# Patient Record
Sex: FEMALE | Race: WHITE | NOT HISPANIC OR LATINO | Employment: FULL TIME | URBAN - METROPOLITAN AREA
[De-identification: names, ages, dates, MRNs, and addresses within clinical notes are randomized per-mention and may not be internally consistent; named-entity substitution may affect disease eponyms.]

---

## 2017-01-27 DIAGNOSIS — E03.9 HYPOTHYROIDISM: Primary | ICD-10-CM

## 2017-01-27 NOTE — TELEPHONE ENCOUNTER
Levothyroxine 100mg  Last Written Prescription Date:  12/6/16  Last Fill Quantity: 30,   # refills: 1  Last Office Visit with [facility] primary care provider:  12/1/16  Future Office visit: none    Pt due for labs, no refills until labs are completed. Rx denied.

## 2017-02-01 DIAGNOSIS — E03.9 HYPOTHYROIDISM: Primary | ICD-10-CM

## 2017-02-01 DIAGNOSIS — E03.9 HYPOTHYROIDISM: ICD-10-CM

## 2017-02-01 PROCEDURE — 84439 ASSAY OF FREE THYROXINE: CPT | Performed by: OBSTETRICS & GYNECOLOGY

## 2017-02-01 PROCEDURE — 84443 ASSAY THYROID STIM HORMONE: CPT | Performed by: OBSTETRICS & GYNECOLOGY

## 2017-02-01 PROCEDURE — 36415 COLL VENOUS BLD VENIPUNCTURE: CPT | Performed by: OBSTETRICS & GYNECOLOGY

## 2017-02-01 RX ORDER — LEVOTHYROXINE SODIUM 100 UG/1
TABLET ORAL
Qty: 30 TABLET | Refills: 0 | Status: SHIPPED | OUTPATIENT
Start: 2017-02-01 | End: 2017-03-17 | Stop reason: DRUGHIGH

## 2017-02-01 NOTE — TELEPHONE ENCOUNTER
Levothyroxine 100mcg      Last Written Prescription Date:  12/30/15  Last Fill Quantity: 90,   # refills: 2  Last Office Visit with [facility] primary care provider:  12/1/16  Future Office visit: none    Pt due for labs, already denied refill once on 1/27/17. Called pt and informed she is due for labs, pt transferred to scheduling to make lab only apt. [facility] extension sent to get pt to her lab appointment.

## 2017-02-02 ENCOUNTER — TELEPHONE (OUTPATIENT)
Dept: NURSING | Facility: CLINIC | Age: 52
End: 2017-02-02

## 2017-02-02 DIAGNOSIS — E03.9 HYPOTHYROID: Primary | ICD-10-CM

## 2017-02-02 LAB
T4 FREE SERPL-MCNC: 1.53 NG/DL (ref 0.76–1.46)
TSH SERPL DL<=0.05 MIU/L-ACNC: 0.82 MU/L (ref 0.4–4)

## 2017-02-02 RX ORDER — LEVOTHYROXINE SODIUM 88 UG/1
88 TABLET ORAL DAILY
Qty: 30 TABLET | Refills: 1 | Status: SHIPPED | OUTPATIENT
Start: 2017-02-02 | End: 2017-03-17

## 2017-02-02 NOTE — TELEPHONE ENCOUNTER
Notes Recorded by Chang Benz MD on 2/2/2017 at 10:16 AM  Decrease dose further to .88 and recheck labs in 6 weeks.    Called pt and informed of results. She stated understanding, had no questions, and was transferred to scheduling to make lab appointment. Rx sent, orders placed.

## 2017-03-06 DIAGNOSIS — F41.9 ANXIETY: ICD-10-CM

## 2017-03-06 DIAGNOSIS — G47.00 INSOMNIA, UNSPECIFIED TYPE: ICD-10-CM

## 2017-03-06 NOTE — TELEPHONE ENCOUNTER
Controlled Substance Refill Request for Pending Prescriptions:                       Disp   Refills    clonazePAM (KLONOPIN) 1 MG tablet [Pharmac*90 tab*0        Sig: TAKE 1 TABLET BY MOUTH ONCE DAILY AT BEDTIME    zolpidem (AMBIEN) 5 MG tablet [Pharmacy Me*90 tab*0        Sig: TAKE 1 TABLET BY MOUTH ONCE NIGHTLY AS NEEDED FOR           SLEEP      Problem List Complete:  Yes   checked in past 6 months?  No, route to RN  Last OV:05/17/2016  Last Refills:12/06/2016  QTY:90  Refills:0  Controlled substance agreement in chart:NO

## 2017-03-07 RX ORDER — ZOLPIDEM TARTRATE 5 MG/1
TABLET ORAL
Qty: 90 TABLET | Refills: 0 | Status: SHIPPED | OUTPATIENT
Start: 2017-03-07 | End: 2018-01-08

## 2017-03-07 RX ORDER — CLONAZEPAM 1 MG/1
TABLET ORAL
Qty: 90 TABLET | Refills: 0 | Status: SHIPPED | OUTPATIENT
Start: 2017-03-07 | End: 2018-01-08

## 2017-03-07 NOTE — TELEPHONE ENCOUNTER
Routing refill request to provider for review/approval because:  Drug not on the [facility] refill protocol     Stefany Hamlin RN, BSN

## 2017-03-16 DIAGNOSIS — E03.9 HYPOTHYROID: ICD-10-CM

## 2017-03-16 PROCEDURE — 84443 ASSAY THYROID STIM HORMONE: CPT | Performed by: OBSTETRICS & GYNECOLOGY

## 2017-03-16 PROCEDURE — 36415 COLL VENOUS BLD VENIPUNCTURE: CPT | Performed by: OBSTETRICS & GYNECOLOGY

## 2017-03-16 PROCEDURE — 84439 ASSAY OF FREE THYROXINE: CPT | Performed by: OBSTETRICS & GYNECOLOGY

## 2017-03-17 DIAGNOSIS — E03.9 HYPOTHYROID: ICD-10-CM

## 2017-03-17 LAB
T4 FREE SERPL-MCNC: 1.12 NG/DL (ref 0.76–1.46)
TSH SERPL DL<=0.05 MIU/L-ACNC: 2.35 MU/L (ref 0.4–4)

## 2017-03-17 RX ORDER — LEVOTHYROXINE SODIUM 88 UG/1
88 TABLET ORAL DAILY
Qty: 90 TABLET | Refills: 2 | Status: SHIPPED | OUTPATIENT
Start: 2017-03-17 | End: 2018-01-05

## 2017-03-17 NOTE — TELEPHONE ENCOUNTER
Notes Recorded by Chang Benz MD on 3/17/2017 at 12:45 PM  Normal. Keep this dose.    LMTCB. Rx pended.

## 2017-03-30 DIAGNOSIS — E03.9 HYPOTHYROID: ICD-10-CM

## 2017-03-30 NOTE — TELEPHONE ENCOUNTER
Fax received from [facility] requesting a 90 day supply dispense of pt;s levothyroxine. Faxed back 90 tab dispense 2 rf was sent on 3/17/17.

## 2017-05-16 ENCOUNTER — TELEPHONE (OUTPATIENT)
Dept: FAMILY MEDICINE | Facility: CLINIC | Age: 52
End: 2017-05-16

## 2017-05-16 NOTE — LETTER
[place] [address]  [address]  [place]  [address]  Tel: [phone]    May 16, 2017    Vero [place]  [address] [address]        Dear Ms. Lua,    Enclosed are the labs completed in the last year at our office.   If you have any further questions or problems, please contact our office.      Sincerely,    Emile Jara MD / mumtaz          Enclosure: Lab Results

## 2017-05-16 NOTE — TELEPHONE ENCOUNTER
Reason for Call:  Blood Work    Detailed comments: Patient asked for her most recent Blood work  With her CBC, Metabolic Panel and Lipids    Phone Number Patient can be reached at: Cell number on file:    Telephone Information:   Mobile [phone]       Best Time: anytime    Please Mail this to her:  [address] [place] [address]    Can we leave a detailed message on this number? YES    Call taken on 5/16/2017 at 12:42 PM by Omero Fairbanks

## 2017-11-19 ENCOUNTER — HEALTH MAINTENANCE LETTER (OUTPATIENT)
Age: 52
End: 2017-11-19

## 2017-12-03 ENCOUNTER — TRANSFERRED RECORDS (OUTPATIENT)
Dept: HEALTH INFORMATION MANAGEMENT | Facility: CLINIC | Age: 52
End: 2017-12-03

## 2018-01-05 DIAGNOSIS — E03.9 HYPOTHYROID: ICD-10-CM

## 2018-01-05 RX ORDER — LEVOTHYROXINE SODIUM 88 UG/1
TABLET ORAL
Qty: 90 TABLET | Refills: 0 | Status: SHIPPED | OUTPATIENT
Start: 2018-01-05 | End: 2018-02-01

## 2018-01-05 NOTE — TELEPHONE ENCOUNTER
Levothyroxine 88mcg      Last Written Prescription Date:  3/17/17  Last Fill Quantity: 90,   # refills: 2  Last Office Visit: 12/1/16  Future Office visit:    Next 5 appointments (look out 90 days)     Jan 11, 2018  9:20 AM CST   PHYSICAL with Chagn Benz MD   [facility] [place] [place]    [address] [place] [address]   [phone]                   Medication is being filled for 1 time refill only due to:  Patient needs to be seen because it has been more than one year since last visit.   Pt due for annual, appt scheduled,3 month supply sent for insurance purposes.

## 2018-01-08 DIAGNOSIS — F41.9 ANXIETY: ICD-10-CM

## 2018-01-08 DIAGNOSIS — G47.00 INSOMNIA, UNSPECIFIED TYPE: ICD-10-CM

## 2018-01-09 NOTE — TELEPHONE ENCOUNTER
Requested Prescriptions   Pending Prescriptions Disp Refills     clonazePAM (KLONOPIN) 1 MG tablet [Pharmacy Med Name: CLONAZEPAM 1 MG TABLET] 90 tablet      Sig: TAKE 1 TABLET BY MOUTH AT BEDTIME    There is no refill protocol information for this order        zolpidem (AMBIEN) 5 MG tablet [Pharmacy Med Name: ZOLPIDEM TARTRATE 5 MG TABLET] 90 tablet      Sig: TAKE 1 TABLET BY MOUTH AT BEDTIME AS NEEDED    There is no refill protocol information for this order        clonazePAM (KLONOPIN) 1 MG tablet      Last Written Prescription Date:  3/07/17  Last Fill Quantity: 90 tablet,   # refills: 0  Last Office Visit: 5/17/16 [facility]  Future Office visit:    Next 5 appointments (look out 90 days)     Jan 11, 2018  9:20 AM CST   PHYSICAL with Chang [facility]z MD   [facility]    [address] [place] [address]   [phone]                   Routing refill request to provider for review/approval because:  Drug not on the [facility] or  Health refill protocol or controlled substance      zolpidem (AMBIEN) 5 MG tablet      Last Written Prescription Date:  3/07/17  Last Fill Quantity: 90 tablet,   # refills: 0  Last Office Visit: 5/17/16 [facility]  Future Office visit:    Next 5 appointments (look out 90 days)     Jan 11, 2018  9:20 AM CST   PHYSICAL with Chang Benz MD   [facility]    [address] [place] [address]   [phone]                   Routing refill request to provider for review/approval because:  Drug not on the [facility] or [facility] refill protocol or controlled substance

## 2018-01-10 RX ORDER — ZOLPIDEM TARTRATE 5 MG/1
TABLET ORAL
Qty: 30 TABLET | Refills: 0 | Status: SHIPPED | OUTPATIENT
Start: 2018-01-10 | End: 2018-02-01

## 2018-01-10 RX ORDER — CLONAZEPAM 1 MG/1
TABLET ORAL
Qty: 30 TABLET | Refills: 0 | Status: SHIPPED | OUTPATIENT
Start: 2018-01-10 | End: 2018-02-01

## 2018-01-10 NOTE — TELEPHONE ENCOUNTER
Please review.  Pt has next F/U on 2/1/17, changed qty from 90 to 30 as has not been seen since 5/17/16.

## 2018-01-29 ENCOUNTER — OFFICE VISIT (OUTPATIENT)
Dept: FAMILY MEDICINE | Facility: CLINIC | Age: 53
End: 2018-01-29
Payer: COMMERCIAL

## 2018-01-29 VITALS
TEMPERATURE: 97.6 F | OXYGEN SATURATION: 100 % | DIASTOLIC BLOOD PRESSURE: 68 MMHG | SYSTOLIC BLOOD PRESSURE: 109 MMHG | WEIGHT: 166 LBS | HEIGHT: 68 IN | BODY MASS INDEX: 25.16 KG/M2 | HEART RATE: 77 BPM

## 2018-01-29 DIAGNOSIS — N30.10 INTERSTITIAL CYSTITIS: ICD-10-CM

## 2018-01-29 DIAGNOSIS — R30.0 DYSURIA: Primary | ICD-10-CM

## 2018-01-29 LAB
ALBUMIN UR-MCNC: NEGATIVE MG/DL
APPEARANCE UR: CLEAR
BILIRUB UR QL STRIP: NEGATIVE
COLOR UR AUTO: YELLOW
GLUCOSE UR STRIP-MCNC: NEGATIVE MG/DL
HGB UR QL STRIP: NEGATIVE
KETONES UR STRIP-MCNC: NEGATIVE MG/DL
LEUKOCYTE ESTERASE UR QL STRIP: NEGATIVE
NITRATE UR QL: NEGATIVE
PH UR STRIP: 6 PH (ref 5–7)
SOURCE: NORMAL
SP GR UR STRIP: <=1.005 (ref 1–1.03)
UROBILINOGEN UR STRIP-ACNC: 0.2 EU/DL (ref 0.2–1)

## 2018-01-29 PROCEDURE — 99213 OFFICE O/P EST LOW 20 MIN: CPT | Performed by: NURSE PRACTITIONER

## 2018-01-29 PROCEDURE — 81003 URINALYSIS AUTO W/O SCOPE: CPT | Performed by: NURSE PRACTITIONER

## 2018-01-29 NOTE — MR AVS SNAPSHOT
"              After Visit Summary   1/29/2018    Vero Lua    MRN: [id number]           Patient Information     Date Of Birth          1965        Visit Information        Provider Department      1/29/2018 9:45 AM Mariama Kaur APRN CNP [facility]        Today's Diagnoses     Dysuria    -  1    Interstitial cystitis           Follow-ups after your visit        Follow-up notes from your care team     Return if symptoms worsen or fail to improve.      Your next 10 appointments already scheduled     Feb 01, 2018  1:00 PM CST   Office Visit with Emile aJra MD   [facility]    [address] [address] [phone]           Bring a current list of meds and any records pertaining to this visit. For Physicals, please bring immunization records and any forms needing to be filled out. Please arrive 10 minutes early to complete paperwork.            Feb 08, 2018  8:00 AM CST   (Arrive by 7:45 AM)   MA SCREENING DIGITAL BILATERAL with [email or web address]   [facility]    [address], [address]  [facility] [address] [phone]           Do not use any powder, lotion or deodorant under your arms or on your breast. If you do, we will ask you to remove it before your exam.  Wear comfortable, two-piece clothing.  If you have any allergies, tell your care team.  Bring any previous mammograms from other facilities or have them mailed to the breast center. Three-dimensional (3D) mammograms are available at [place] locations in [facility], [facility], and [place]. [facility] locations include [place] and [facility] in [place]. Benefits of 3D mammograms include: - Improved rate of cancer detection - Decreases your chance of having to go back for more tests, which means fewer: - \"False-positive\" results (This means that " "there is an abnormal area but it isn't cancer.) - Invasive testing procedures, such as a biopsy or surgery - Can provide clearer images of the breast if you have dense breast tissue. 3D mammography is an optional exam that anyone can have with a 2D mammogram. It doesn't replace or take the place of a 2D mammogram. 2D mammograms remain an effective screening test for all women.  Not all insurance companies cover the cost of a 3D mammogram. Check with your insurance.            Feb 08, 2018  8:20 AM CST   PHYSICAL with Chang Benz MD   [facility]    [address] [address] [phone]              Who to contact     If you have questions or need follow up information about today's clinic visit or your schedule please contact [facility] directly at [phone].  Normal or non-critical lab and imaging results will be communicated to you by [employer], letter or phone within 4 business days after the clinic has received the results. If you do not hear from us within 7 days, please contact the clinic through [employer] or phone. If you have a critical or abnormal lab result, we will notify you by phone as soon as possible.  Submit refill requests through [employer] or call your pharmacy and they will forward the refill request to us. Please allow 3 business days for your refill to be completed.          Additional Information About Your Visit        [employer] Information     [employer] lets you send messages to your doctor, view your test results, renew your prescriptions, schedule appointments and more. To sign up, go to [email or web address] . Click on \"Log in\" on the left side of the screen, which will take you to the Welcome page. Then click on \"Sign up Now\" on the right side of the page.     You will be asked to enter the access code listed below, as well as some personal information. Please follow the directions to " "create your username and password.     Your access code is: [email or web address]  Expires: 2018 10:47 AM     Your access code will  in 90 days. If you need help or a new code, please call your [facility] clinic or [phone].        Care EveryWhere ID     This is your Care EveryWhere ID. This could be used by other organizations to access your [facility] medical records  [id number]        Your Vitals Were     Pulse Temperature Height Last Period Pulse Oximetry BMI (Body Mass Index)    77 97.6  F (36.4  C) (Oral) 5' 7.5\" (1.715 m) 2012 100% 25.62 kg/m2       Blood Pressure from Last 3 Encounters:   18 109/68   16 108/64   16 106/75    Weight from Last 3 Encounters:   18 166 lb (75.3 kg)   16 176 lb (79.8 kg)   16 174 lb 1.6 oz (79 kg)              We Performed the Following     *UA reflex to Microscopic and Culture ([place] [facility] (except [place] and [place])        Primary Care Provider Office Phone # Fax #    Emile Jara [phone] [address] [address]        Equal Access to Services     [facility] CenterYASMINE : Hadii aad ku hadasho Sorajinderali, waaxda luqadaha, qaybta kaalmada adeegyada, joleen henning . So [facility] [phone].    ATENCIÓN: Si habla español, tiene a sanchez disposición servicios gratuitos de asistencia lingüística. Llame al [phone].    We comply with applicable federal civil rights laws and [place] laws. We do not discriminate on the basis of race, color, national origin, age, disability, sex, sexual orientation, or gender identity.            Thank you!     Thank you for choosing [facility]  for your care. Our goal is always to provide you with excellent care. Hearing back from our patients is one way we can continue to improve our services. Please take a few minutes to complete the written survey that you may receive in the mail after your visit with us. " Thank you!             Your Updated Medication List - Protect others around you: Learn how to safely use, store and throw away your medicines at www.disposemymeds.org.          This list is accurate as of 1/29/18 10:47 AM.  Always use your most recent med list.                   Brand Name Dispense Instructions for use Diagnosis    cholecalciferol 1000 UNIT tablet    vitamin D3    100 tablet    Take 1 tablet (1,000 Units) by mouth daily        CLARITIN 10 MG tablet   Generic drug:  loratadine     30 tablet    Take 1 tablet (10 mg) by mouth daily        clonazePAM 1 MG tablet    klonoPIN    30 tablet    TAKE 1 TABLET BY MOUTH AT BEDTIME    Anxiety       levothyroxine 88 MCG tablet    SYNTHROID/LEVOTHROID    90 tablet    **CAN FILL 3-30-17**TAKE 1 TABLET (88 MCG) BY MOUTH DAILY    Hypothyroid       TOPAMAX 200 MG tablet   Generic drug:  topiramate      Take 200 mg by mouth daily        vortioxetine 10 MG tablet    BRINTELLIX    90 tablet    Take 1 tablet (10 mg) by mouth daily    Major depressive disorder, recurrent episode, in full remission (H)       zolpidem 5 MG tablet    AMBIEN    30 tablet    TAKE 1 TABLET BY MOUTH AT BEDTIME AS NEEDED    Insomnia, unspecified type

## 2018-01-29 NOTE — LETTER
[facility]  [address]  [address]  [place]  [address]  Tel: [phone]    January 29, 2018    Vero Lua  [address]  [place] [address]        Dear Ms. Lua,    Enclosed are your results.    If you have any further questions or problems, please contact our office.      Sincerely,    Mariama Kaur NP/ Oma Sanchez, CMA  Results for orders placed or performed in visit on 01/29/18   *UA reflex to Microscopic and Culture ([place] [facility] (except [place] and [place])   Result Value Ref Range    Color Urine Yellow     Appearance Urine Clear     Glucose Urine Negative NEG^Negative mg/dL    Bilirubin Urine Negative NEG^Negative    Ketones Urine Negative NEG^Negative mg/dL    Specific Gravity Urine <=1.005 1.003 - 1.035    Blood Urine Negative NEG^Negative    pH Urine 6.0 5.0 - 7.0 pH    Protein Albumin Urine Negative NEG^Negative mg/dL    Urobilinogen Urine 0.2 0.2 - 1.0 EU/dL    Nitrite Urine Negative NEG^Negative    Leukocyte Esterase Urine Negative NEG^Negative    Source Midstream Urine    '          Enclosure: Lab Results

## 2018-01-29 NOTE — NURSING NOTE
"Chief Complaint   Patient presents with     UTI       Initial /68 (BP Location: Right arm, Cuff Size: Adult Large)  Pulse 77  Temp 97.6  F (36.4  C) (Oral)  Ht 5' 7.5\" (1.715 m)  Wt 166 lb (75.3 kg)  LMP 06/27/2012  SpO2 100%  BMI 25.62 kg/m2 Estimated body mass index is 25.62 kg/(m^2) as calculated from the following:    Height as of this encounter: 5' 7.5\" (1.715 m).    Weight as of this encounter: 166 lb (75.3 kg).  Medication Reconciliation: complete     Jo Lantigua MA    "

## 2018-01-29 NOTE — PROGRESS NOTES
SUBJECTIVE:   Vero Lua is a 52 year old female who presents to clinic today for the following health issues:      URINARY TRACT SYMPTOMS      Duration: 2 weeks of increasing urinary frequency    Description  dysuria, frequency, urgency, nocturia x 1-2 and hesitancy    Intensity:  severe    Accompanying signs and symptoms:  Fever/chills: NO  Flank pain no   Nausea and vomiting: no   Vaginal symptoms:no  Abdominal/Pelvic Pain: no     History  History of frequent UTI's: no   History of kidney stones: no   Sexually Active: YES  Possibility of pregnancy: No    Precipitating or alleviating factors: None    Therapies tried and outcome: none   Outcome:   INterstim placed 4/2016    Problem list and histories reviewed & adjusted, as indicated.  Additional history: as documented    Patient Active Problem List   Diagnosis     Fibromyalgia     Restless leg syndrome     Interstitial cystitis     Migraines     Hypothyroid     CARDIOVASCULAR SCREENING; LDL GOAL LESS THAN 160     Depression, major, in partial remission (H)     Anxiety     Insomnia     Adenomatous polyp of colon     Past Surgical History:   Procedure Laterality Date     interstim procedure  2015, 2010 and 2008    [facility]       Social History   Substance Use Topics     Smoking status: Never Smoker     Smokeless tobacco: Never Used     Alcohol use No     Family History   Problem Relation Age of Onset     MENTAL ILLNESS Mother      Depression Sister      Depression Father      OSTEOPOROSIS Maternal Grandmother          Current Outpatient Prescriptions   Medication Sig Dispense Refill     clonazePAM (KLONOPIN) 1 MG tablet TAKE 1 TABLET BY MOUTH AT BEDTIME 30 tablet 0     zolpidem (AMBIEN) 5 MG tablet TAKE 1 TABLET BY MOUTH AT BEDTIME AS NEEDED 30 tablet 0     levothyroxine (SYNTHROID/LEVOTHROID) 88 MCG tablet **CAN FILL 3-30-17**TAKE 1 TABLET (88 MCG) BY MOUTH DAILY 90 tablet 0     vortioxetine (BRINTELLIX) 10 MG Yes Take 1 tablet (10 mg) by mouth daily 90  "tablet 0     loratadine (CLARITIN) 10 MG tablet Take 1 tablet (10 mg) by mouth daily 30 tablet 1     cholecalciferol (VITAMIN D) 1000 UNIT tablet Take 1 tablet (1,000 Units) by mouth daily 100 tablet 3     topiramate (TOPAMAX) 200 MG tablet Take 200 mg by mouth daily        Allergies   Allergen Reactions     No Known Allergies      Adhesive Tape Rash       Reviewed and updated as needed this visit by clinical staff       Reviewed and updated as needed this visit by Provider         ROS:  Constitutional, HEENT, cardiovascular, pulmonary, gi and gu systems are negative, except as otherwise noted.  MS: no back pain  : no pelvic pain  OBJECTIVE:     /68 (BP Location: Right arm, Cuff Size: Adult Large)  Pulse 77  Temp 97.6  F (36.4  C) (Oral)  Ht 5' 7.5\" (1.715 m)  Wt 166 lb (75.3 kg)  LMP 06/27/2012  SpO2 100%  BMI 25.62 kg/m2  Body mass index is 25.62 kg/(m^2).  GENERAL: healthy, alert and no distress  ABDOMEN: soft, nontender, no hepatosplenomegaly, no masses and bowel sounds normal  BACK: no CVA tenderness, no paralumbar tenderness    Diagnostic Test Results:  Results for orders placed or performed in visit on 01/29/18   *UA reflex to Microscopic and Culture ([facility] (except [place] and [place])   Result Value Ref Range    Color Urine Yellow     Appearance Urine Clear     Glucose Urine Negative NEG^Negative mg/dL    Bilirubin Urine Negative NEG^Negative    Ketones Urine Negative NEG^Negative mg/dL    Specific Gravity Urine <=1.005 1.003 - 1.035    Blood Urine Negative NEG^Negative    pH Urine 6.0 5.0 - 7.0 pH    Protein Albumin Urine Negative NEG^Negative mg/dL    Urobilinogen Urine 0.2 0.2 - 1.0 EU/dL    Nitrite Urine Negative NEG^Negative    Leukocyte Esterase Urine Negative NEG^Negative    Source Midstream Urine        ASSESSMENT/PLAN:         ICD-10-CM    1. Dysuria R30.0 *UA reflex to Microscopic and Culture ([place] [facility] (except [place] and [place])   2. " Interstitial cystitis N30.10        Vero will schedule appt with her urologist     WILBER Garg [facility]

## 2018-01-31 ENCOUNTER — TRANSFERRED RECORDS (OUTPATIENT)
Dept: HEALTH INFORMATION MANAGEMENT | Facility: CLINIC | Age: 53
End: 2018-01-31

## 2018-02-01 ENCOUNTER — OFFICE VISIT (OUTPATIENT)
Dept: FAMILY MEDICINE | Facility: CLINIC | Age: 53
End: 2018-02-01
Payer: COMMERCIAL

## 2018-02-01 VITALS
SYSTOLIC BLOOD PRESSURE: 110 MMHG | WEIGHT: 165 LBS | HEIGHT: 68 IN | HEART RATE: 71 BPM | TEMPERATURE: 97.7 F | OXYGEN SATURATION: 100 % | BODY MASS INDEX: 25.01 KG/M2 | DIASTOLIC BLOOD PRESSURE: 78 MMHG

## 2018-02-01 DIAGNOSIS — R53.83 OTHER FATIGUE: ICD-10-CM

## 2018-02-01 DIAGNOSIS — E03.9 HYPOTHYROIDISM, UNSPECIFIED TYPE: Primary | ICD-10-CM

## 2018-02-01 DIAGNOSIS — Z11.59 ENCOUNTER FOR HCV SCREENING TEST FOR LOW RISK PATIENT: ICD-10-CM

## 2018-02-01 DIAGNOSIS — Z13.6 CARDIOVASCULAR SCREENING; LDL GOAL LESS THAN 160: ICD-10-CM

## 2018-02-01 DIAGNOSIS — F51.04 CHRONIC INSOMNIA: ICD-10-CM

## 2018-02-01 DIAGNOSIS — F33.42 MAJOR DEPRESSIVE DISORDER, RECURRENT EPISODE, IN FULL REMISSION (H): ICD-10-CM

## 2018-02-01 DIAGNOSIS — Z23 NEED FOR PROPHYLACTIC VACCINATION AND INOCULATION AGAINST INFLUENZA: ICD-10-CM

## 2018-02-01 DIAGNOSIS — F41.9 ANXIETY: ICD-10-CM

## 2018-02-01 LAB
ERYTHROCYTE [DISTWIDTH] IN BLOOD BY AUTOMATED COUNT: 13.2 % (ref 10–15)
HCT VFR BLD AUTO: 41.3 % (ref 35–47)
HGB BLD-MCNC: 13 G/DL (ref 11.7–15.7)
MCH RBC QN AUTO: 30.4 PG (ref 26.5–33)
MCHC RBC AUTO-ENTMCNC: 31.5 G/DL (ref 31.5–36.5)
MCV RBC AUTO: 97 FL (ref 78–100)
PLATELET # BLD AUTO: 157 10E9/L (ref 150–450)
RBC # BLD AUTO: 4.28 10E12/L (ref 3.8–5.2)
WBC # BLD AUTO: 3.7 10E9/L (ref 4–11)

## 2018-02-01 PROCEDURE — 86803 HEPATITIS C AB TEST: CPT | Performed by: INTERNAL MEDICINE

## 2018-02-01 PROCEDURE — 90471 IMMUNIZATION ADMIN: CPT | Performed by: INTERNAL MEDICINE

## 2018-02-01 PROCEDURE — 85027 COMPLETE CBC AUTOMATED: CPT | Performed by: INTERNAL MEDICINE

## 2018-02-01 PROCEDURE — 90686 IIV4 VACC NO PRSV 0.5 ML IM: CPT | Performed by: INTERNAL MEDICINE

## 2018-02-01 PROCEDURE — 99214 OFFICE O/P EST MOD 30 MIN: CPT | Mod: 25 | Performed by: INTERNAL MEDICINE

## 2018-02-01 PROCEDURE — 84443 ASSAY THYROID STIM HORMONE: CPT | Performed by: INTERNAL MEDICINE

## 2018-02-01 PROCEDURE — 84439 ASSAY OF FREE THYROXINE: CPT | Performed by: INTERNAL MEDICINE

## 2018-02-01 PROCEDURE — 80061 LIPID PANEL: CPT | Performed by: INTERNAL MEDICINE

## 2018-02-01 PROCEDURE — 36415 COLL VENOUS BLD VENIPUNCTURE: CPT | Performed by: INTERNAL MEDICINE

## 2018-02-01 RX ORDER — CLONAZEPAM 1 MG/1
1 TABLET ORAL AT BEDTIME
Qty: 90 TABLET | Refills: 0 | Status: SHIPPED | OUTPATIENT
Start: 2018-02-01 | End: 2019-09-09

## 2018-02-01 RX ORDER — ZOLPIDEM TARTRATE 5 MG/1
TABLET ORAL
Qty: 30 TABLET | Refills: 0 | Status: SHIPPED | OUTPATIENT
Start: 2018-02-01 | End: 2019-09-09

## 2018-02-01 RX ORDER — LEVOTHYROXINE SODIUM 88 UG/1
TABLET ORAL
Qty: 90 TABLET | Refills: 3 | Status: SHIPPED | OUTPATIENT
Start: 2018-02-01 | End: 2018-07-19

## 2018-02-01 NOTE — PROGRESS NOTES
Injectable Influenza Immunization Documentation    1.  Is the person to be vaccinated sick today?   No    2. Does the person to be vaccinated have an allergy to a component   of the vaccine?   No  Egg Allergy Algorithm Link    3. Has the person to be vaccinated ever had a serious reaction   to influenza vaccine in the past?   No    4. Has the person to be vaccinated ever had Guillain-Barré syndrome?   No    Form completed by Claudia Kan CMA  Prior to injection verified patient identity using patient's name and date of birth.

## 2018-02-01 NOTE — PROGRESS NOTES
The patient is here for follow-up multiple medical problems.  Unfortunately she and her   recently.  They have 1 daughter age 20 at school in [place].  The patient is doing relatively well in terms of her stress.    The patient has hypothyroidism.  She takes her medication regularly although is out the last 2 days.  She does note fatigue for some time.  She sleeps fairly well.    The patient has a history of depression and an anxiety.  She does not see psychiatry anymore but is seeing a psychologist.  Despite her stress she feels like she is doing fairly well and feels as if the anxiety and depression are under good control.    The patient has chronic insomnia and for years has taken both Klonopin and Ambien at night.  She is to be on 10 mg of Ambien but is on 5 mg now.  She sleeps fairly well with this but wonders if she can get off of these medications.  She takes the Klonopin more for restless legs.    Patient otherwise feels well.  No cardiovascular, respiratory, GI or  symptoms.  She is seeing her gynecologist tomorrow.  She needs a flu shot.      Past Medical History:   Diagnosis Date     Adenomatous polyp of colon 5/16    fu 5 years[place] [facility]     Anxiety      Depression, major, in partial remission (H)     was seeing psyche until 2015, not since then     Fibromyalgia 1998     Hypothyroid 90's     Insomnia     on ambien and klonopin for over 10 years together     Interstitial cystitis     urol [facility] and had interstem     Migraines     Dr. Cash of neuro, got botox 2012 via [facility] Dr. Kevin, now Dr. Watkins     Restless leg syndrome      Urgency of urination     interstim     Past Surgical History:   Procedure Laterality Date     interstim procedure  2015, 2010 and 2008    [facility]     Social History     Social History     Marital status:      Spouse name: N/A     Number of children: 1     Years of education: N/A     Occupational History     homemaker None      Social History Main  "Topics     Smoking status: Never Smoker     Smokeless tobacco: Never Used     Alcohol use No     Drug use: No     Sexual activity: Yes     Partners: Male     Birth control/ protection: Post-menopausal     Other Topics Concern     Not on file     Social History Narrative     Current Outpatient Prescriptions   Medication Sig Dispense Refill     levothyroxine (SYNTHROID/LEVOTHROID) 88 MCG tablet **CAN FILL 3-30-17**TAKE 1 TABLET (88 MCG) BY MOUTH DAILY 90 tablet 3     zolpidem (AMBIEN) 5 MG tablet TAKE 1 TABLET BY MOUTH AT BEDTIME AS NEEDED 30 tablet 0     clonazePAM (KLONOPIN) 1 MG tablet Take 1 tablet (1 mg) by mouth At Bedtime 90 tablet 0     vortioxetine (BRINTELLIX) 10 MG tablet Take 1 tablet (10 mg) by mouth daily 90 tablet 3     loratadine (CLARITIN) 10 MG tablet Take 1 tablet (10 mg) by mouth daily 30 tablet 1     cholecalciferol (VITAMIN D) 1000 UNIT tablet Take 1 tablet (1,000 Units) by mouth daily 100 tablet 3     topiramate (TOPAMAX) 200 MG tablet Take 200 mg by mouth daily        [DISCONTINUED] clonazePAM (KLONOPIN) 1 MG tablet TAKE 1 TABLET BY MOUTH AT BEDTIME 30 tablet 0     [DISCONTINUED] zolpidem (AMBIEN) 5 MG tablet TAKE 1 TABLET BY MOUTH AT BEDTIME AS NEEDED 30 tablet 0     [DISCONTINUED] levothyroxine (SYNTHROID/LEVOTHROID) 88 MCG tablet **CAN FILL 3-30-17**TAKE 1 TABLET (88 MCG) BY MOUTH DAILY 90 tablet 0     Allergies   Allergen Reactions     No Known Allergies      Adhesive Tape Rash     FAMILY HISTORY NOTED AND REVIEWED    REVIEW OF SYSTEMS: above    PHYSICAL EXAM    /78 (BP Location: Left arm, Patient Position: Sitting, Cuff Size: Adult Regular)  Pulse 71  Temp 97.7  F (36.5  C) (Tympanic)  Ht 5' 7.5\" (1.715 m)  Wt 165 lb (74.8 kg)  LMP 06/27/2012  SpO2 100%  Breastfeeding? No  BMI 25.46 kg/m2    Patient appears non toxic  Mouth - tongue midline and within normal limits, mucous membranes and posterior pharynx within normal limits, no lesions seen.  Neck - no masses, lesions or " tenderness  Nodes - no supraclavicular, cervical or axially adenopathy .  Lungs - clear, normal flow  Cardiovascular - regular rate and rhythm, no murmer, rub or gallop, no jvp or edema, carotids within normal limits, no bruits.  Abdomen - normal active bowel sounds, soft, non tender, no masses, guarding or rebound, no hepatosplenomegaly      Labs sent    ASSESSMENT:  1. Depression and anxiety, controlled on medications, continue counseling  2. Insomnia, chronic, will try weaning the ambien first  3. Hypothyroid, follow up tsh  4. Fatigue, suspect due to her stress  5. hcm    PLAN:  Flu shot  Labs  Change ambien to 1/2 mg for 4 weeks then off if possible  Exercise, diet  Follow up gyn  Up to date chris Jara M.D.

## 2018-02-01 NOTE — NURSING NOTE
"Chief Complaint   Patient presents with     Recheck Medication       Initial /78 (BP Location: Left arm, Patient Position: Sitting, Cuff Size: Adult Regular)  Pulse 71  Temp 97.7  F (36.5  C) (Tympanic)  Ht 5' 7.5\" (1.715 m)  Wt 165 lb (74.8 kg)  LMP 06/27/2012  SpO2 100%  Breastfeeding? No  BMI 25.46 kg/m2 Estimated body mass index is 25.46 kg/(m^2) as calculated from the following:    Height as of this encounter: 5' 7.5\" (1.715 m).    Weight as of this encounter: 165 lb (74.8 kg).  Medication Reconciliation: complete \  Claudia Gutiérrez- CMA    "

## 2018-02-01 NOTE — MR AVS SNAPSHOT
"              After Visit Summary   2/1/2018    Vero Lua    MRN: [id number]           Patient Information     Date Of Birth          1965        Visit Information        Provider Department      2/1/2018 1:00 PM Emile Jara MD [facility]        Today's Diagnoses     Hypothyroidism, unspecified type    -  1    Major depressive disorder, recurrent episode, in full remission (H)        Anxiety        Chronic insomnia        Other fatigue        Encounter for HCV screening test for low risk patient        CARDIOVASCULAR SCREENING; LDL GOAL LESS THAN 160           Follow-ups after your visit        Your next 10 appointments already scheduled     Feb 08, 2018  8:00 AM CST   (Arrive by 7:45 AM)   MA SCREENING DIGITAL BILATERAL with WEMA1   [facility] [place] ([facility] [place])    [address] [place] [address], [address]  [facility] [address] [phone]           Do not use any powder, lotion or deodorant under your arms or on your breast. If you do, we will ask you to remove it before your exam.  Wear comfortable, two-piece clothing.  If you have any allergies, tell your care team.  Bring any previous mammograms from other facilities or have them mailed to the breast center. Three-dimensional (3D) mammograms are available at [facility] locations in [facility], [place], [facility], [place], [place], and [place]. [facility] locations include [place] and [facility] in [place]. Benefits of 3D mammograms include: - Improved rate of cancer detection - Decreases your chance of having to go back for more tests, which means fewer: - \"False-positive\" results (This means that there is an abnormal area but it isn't cancer.) - Invasive testing procedures, such as a biopsy or surgery - Can provide clearer images of the breast if you have dense breast tissue. 3D mammography is an optional exam that anyone can have with a 2D mammogram. It " "doesn't replace or take the place of a 2D mammogram. 2D mammograms remain an effective screening test for all women.  Not all insurance companies cover the cost of a 3D mammogram. Check with your insurance.            2018  8:20 AM CST   PHYSICAL with Chang Benz MD   [facility] [place] ([facility])    [address] [place] [address] [phone]              Who to contact     If you have questions or need follow up information about today's clinic visit or your schedule please contact [facility] directly at [phone].  Normal or non-critical lab and imaging results will be communicated to you by MyChart, letter or phone within 4 business days after the clinic has received the results. If you do not hear from us within 7 days, please contact the clinic through [employer] or phone. If you have a critical or abnormal lab result, we will notify you by phone as soon as possible.  Submit refill requests through [employer] or call your pharmacy and they will forward the refill request to us. Please allow 3 business days for your refill to be completed.          Additional Information About Your Visit        [employer] Information     [employer] lets you send messages to your doctor, view your test results, renew your prescriptions, schedule appointments and more. To sign up, go to [email or web address] . Click on \"Log in\" on the left side of the screen, which will take you to the Welcome page. Then click on \"Sign up Now\" on the right side of the page.     You will be asked to enter the access code listed below, as well as some personal information. Please follow the directions to create your username and password.     Your access code is: [email or web address]  Expires: 2018 10:47 AM     Your access code will  in 90 days. If you need help or a new code, please call your [facility] or [phone].        Care EveryWhere ID     This " "is your Care EveryWhere ID. This could be used by other organizations to access your [facility] medical records  [id number]        Your Vitals Were     Pulse Temperature Height Last Period Pulse Oximetry Breastfeeding?    71 97.7  F (36.5  C) (Tympanic) 5' 7.5\" (1.715 m) 06/27/2012 100% No    BMI (Body Mass Index)                   25.46 kg/m2            Blood Pressure from Last 3 Encounters:   02/01/18 110/78   01/29/18 109/68   12/01/16 108/64    Weight from Last 3 Encounters:   02/01/18 165 lb (74.8 kg)   01/29/18 166 lb (75.3 kg)   12/01/16 176 lb (79.8 kg)              We Performed the Following     CBC with platelets     Hepatitis C Screen Reflex to HCV RNA Quant and Genotype     Lipid panel reflex to direct LDL Non-fasting     TSH with free T4 reflex          Today's Medication Changes          These changes are accurate as of 2/1/18  1:22 PM.  If you have any questions, ask your nurse or doctor.               These medicines have changed or have updated prescriptions.        Dose/Directions    clonazePAM 1 MG tablet   Commonly known as:  klonoPIN   This may have changed:  See the new instructions.   Used for:  Anxiety   Changed by:  Emile Jara MD        Dose:  1 mg   Take 1 tablet (1 mg) by mouth At Bedtime   Quantity:  90 tablet   Refills:  0       levothyroxine 88 MCG tablet   Commonly known as:  SYNTHROID/LEVOTHROID   This may have changed:  See the new instructions.   Used for:  Hypothyroidism, unspecified type   Changed by:  Emile Jara MD        **CAN FILL 3-30-17**TAKE 1 TABLET (88 MCG) BY MOUTH DAILY   Quantity:  90 tablet   Refills:  3       zolpidem 5 MG tablet   Commonly known as:  AMBIEN   This may have changed:  See the new instructions.   Used for:  Chronic insomnia   Changed by:  Emile Jara MD        TAKE 1 TABLET BY MOUTH AT BEDTIME AS NEEDED   Quantity:  30 tablet   Refills:  0            Where to get your medicines      These medications were sent to " [facility]/pharmacy #[id number] - [place] - [address]  [address], [place] [address]     Phone:  [phone]     levothyroxine 88 MCG tablet    vortioxetine 10 MG tablet         Some of these will need a paper prescription and others can be bought over the counter.  Ask your nurse if you have questions.     Bring a paper prescription for each of these medications     clonazePAM 1 MG tablet    zolpidem 5 MG tablet                Primary Care Provider Office Phone # Fax #    Emile Jara [phone] [address] [facility] [address]  [facility] [address]        Equal Access to Services     JAKE LAINEZ : Hadii marge Ray, wagui irizarry, qaybsamantha kaalmada shelly, joleen wells. So [facility] [phone].    ATENCIÓN: Si habla español, tiene a sanchez disposición servicios gratuitos de asistencia lingüística. Llame al [phone].    We comply with applicable federal civil rights laws and [place] laws. We do not discriminate on the basis of race, color, national origin, age, disability, sex, sexual orientation, or gender identity.            Thank you!     Thank you for choosing [facility]  for your care. Our goal is always to provide you with excellent care. Hearing back from our patients is one way we can continue to improve our services. Please take a few minutes to complete the written survey that you may receive in the mail after your visit with us. Thank you!             Your Updated Medication List - Protect others around you: Learn how to safely use, store and throw away your medicines at www.disposemymeds.org.          This list is accurate as of 2/1/18  1:22 PM.  Always use your most recent med list.                   Brand Name Dispense Instructions for use Diagnosis    cholecalciferol 1000 UNIT tablet    vitamin D3    100 tablet    Take 1 tablet (1,000 Units) by mouth daily        CLARITIN 10 MG tablet   Generic drug:   loratadine     30 tablet    Take 1 tablet (10 mg) by mouth daily        clonazePAM 1 MG tablet    klonoPIN    90 tablet    Take 1 tablet (1 mg) by mouth At Bedtime    Anxiety       levothyroxine 88 MCG tablet    SYNTHROID/LEVOTHROID    90 tablet    **CAN FILL 3-30-17**TAKE 1 TABLET (88 MCG) BY MOUTH DAILY    Hypothyroidism, unspecified type       TOPAMAX 200 MG tablet   Generic drug:  topiramate      Take 200 mg by mouth daily        vortioxetine 10 MG tablet    BRINTELLIX    90 tablet    Take 1 tablet (10 mg) by mouth daily    Major depressive disorder, recurrent episode, in full remission (H)       zolpidem 5 MG tablet    AMBIEN    30 tablet    TAKE 1 TABLET BY MOUTH AT BEDTIME AS NEEDED    Chronic insomnia

## 2018-02-01 NOTE — LETTER
[place] [address] [facility]  [address]  [place]  [address]  Tel: [phone]    February 5, 2018    Vero Lua  [address]  [place] [address]        Dear Ms. Lua,    It was a pleasure seeing you.  I wanted to get back to you with your test results.  I have enclosed a copy for your records.     For the most part your labs look very good.  This includes your cholesterol, blood count and hepatitis C test.     Your tsh or thyroid test is just barely off, not enough to make a change.  I do want to repeat this in 4 months.     Please come back to repeat this in 4 months.  If you have any questions please call me.       Sincerely,    Emile Jara MD/gen    Results for orders placed or performed in visit on 02/01/18   TSH with free T4 reflex   Result Value Ref Range    TSH 6.05 (H) 0.40 - 4.00 mU/L   CBC with platelets   Result Value Ref Range    WBC 3.7 (L) 4.0 - 11.0 10e9/L    RBC Count 4.28 3.8 - 5.2 10e12/L    Hemoglobin 13.0 11.7 - 15.7 g/dL    Hematocrit 41.3 35.0 - 47.0 %    MCV 97 78 - 100 fl    MCH 30.4 26.5 - 33.0 pg    MCHC 31.5 31.5 - 36.5 g/dL    RDW 13.2 10.0 - 15.0 %    Platelet Count 157 150 - 450 10e9/L   Lipid panel reflex to direct LDL Non-fasting   Result Value Ref Range    Cholesterol 183 <200 mg/dL    Triglycerides 93 <150 mg/dL    HDL Cholesterol 77 >49 mg/dL    LDL Cholesterol Calculated 87 <100 mg/dL    Non HDL Cholesterol 106 <130 mg/dL   Hepatitis C Screen Reflex to HCV RNA Quant and Genotype   Result Value Ref Range    Hepatitis C Antibody Nonreactive NR^Nonreactive   T4 free   Result Value Ref Range    T4 Free 1.00 0.76 - 1.46 ng/dL           Enclosure: Lab Results

## 2018-02-03 LAB
CHOLEST SERPL-MCNC: 183 MG/DL
HCV AB SERPL QL IA: NONREACTIVE
HDLC SERPL-MCNC: 77 MG/DL
LDLC SERPL CALC-MCNC: 87 MG/DL
NONHDLC SERPL-MCNC: 106 MG/DL
T4 FREE SERPL-MCNC: 1 NG/DL (ref 0.76–1.46)
TRIGL SERPL-MCNC: 93 MG/DL
TSH SERPL DL<=0.005 MIU/L-ACNC: 6.05 MU/L (ref 0.4–4)

## 2018-02-04 NOTE — PROGRESS NOTES
It was a pleasure seeing you.  I wanted to get back to you with your test results.  I have enclosed a copy for your records.    For the most part your labs look very good.  This includes your cholesterol, blood count and hepatitis C test.    Your tsh or thyroid test is just barely off, not enough to make a change.  I do want to repeat this in 4 months.    Please come back to repeat this in 4 months.  If you have any questions please call me.

## 2018-02-05 ENCOUNTER — TELEPHONE (OUTPATIENT)
Dept: FAMILY MEDICINE | Facility: CLINIC | Age: 53
End: 2018-02-05

## 2018-02-05 ENCOUNTER — OFFICE VISIT (OUTPATIENT)
Dept: FAMILY MEDICINE | Facility: CLINIC | Age: 53
End: 2018-02-05
Payer: COMMERCIAL

## 2018-02-05 VITALS
BODY MASS INDEX: 25.01 KG/M2 | DIASTOLIC BLOOD PRESSURE: 69 MMHG | WEIGHT: 165 LBS | OXYGEN SATURATION: 98 % | HEART RATE: 82 BPM | HEIGHT: 68 IN | RESPIRATION RATE: 18 BRPM | SYSTOLIC BLOOD PRESSURE: 104 MMHG | TEMPERATURE: 98 F

## 2018-02-05 DIAGNOSIS — S13.9XXA NECK SPRAIN, INITIAL ENCOUNTER: ICD-10-CM
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
## 2018-02-05 PROCEDURE — 99213 OFFICE O/P EST LOW 20 MIN: CPT | Performed by: INTERNAL MEDICINE

## 2018-02-05 NOTE — PATIENT INSTRUCTIONS
Use aleve twice daily for the next 3 to 5 days.      Put heat on the area about 4x a day for 15 minutes.    If the discomfort worsens or is not going away in the next 10 days let me know.    Emile Jara M.D.
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
## 2018-02-05 NOTE — MR AVS SNAPSHOT
After Visit Summary   2/5/2018    Vero Lua    MRN: [id number]           Patient Information     Date Of Birth          1965        Visit Information        Provider Department      2/5/2018 4:15 PM Emile Jara MD [facility]        Care Instructions    Use aleve twice daily for the next 3 to 5 days.      Put heat on the area about 4x a day for 15 minutes.    If the discomfort worsens or is not going away in the next 10 days let me know.    Emile Jara M.D.            Follow-ups after your visit        Your next 10 appointments already scheduled     Feb 05, 2018  4:15 PM CST   [facility]   [facility]    [address] [facility] [id number] [phone]           Bring a current list of meds and any records pertaining to this visit. For Physicals, please bring immunization records and any forms needing to be filled out. Please arrive 10 minutes early to complete paperwork.            Feb 08, 2018  8:00 AM CST   (Arrive by 7:45 AM)   MA SCREENING DIGITAL BILATERAL with WEMA1   [facility] [place] ([facility])    [address] [facility] [address]  [facility] [id number] [phone]           Do not use any powder, lotion or deodorant under your arms or on your breast. If you do, we will ask you to remove it before your exam.  Wear comfortable, two-piece clothing.  If you have any allergies, tell your care team.  Bring any previous mammograms from other facilities or have them mailed to the breast center. Three-dimensional (3D) mammograms are available at [facility] locations in [facility], [facility], and [place]. [facility] locations include [place] and [facility] in [place]. Benefits of 3D mammograms include: - Improved rate of cancer detection - Decreases your chance of having to go back for  "more tests, which means fewer: - \"False-positive\" results (This means that there is an abnormal area but it isn't cancer.) - Invasive testing procedures, such as a biopsy or surgery - Can provide clearer images of the breast if you have dense breast tissue. 3D mammography is an optional exam that anyone can have with a 2D mammogram. It doesn't replace or take the place of a 2D mammogram. 2D mammograms remain an effective screening test for all women.  Not all insurance companies cover the cost of a 3D mammogram. Check with your insurance.            Feb 08, 2018  8:20 AM CST   PHYSICAL with Chang Benz MD   [facility] ([facility])    [address] [place] [address] [phone]              Who to contact     If you have questions or need follow up information about today's clinic visit or your schedule please contact [facility] directly at [phone].  Normal or non-critical lab and imaging results will be communicated to you by [facility], letter or phone within 4 business days after the clinic has received the results. If you do not hear from us within 7 days, please contact the clinic through [employer] or phone. If you have a critical or abnormal lab result, we will notify you by phone as soon as possible.  Submit refill requests through [employer] or call your pharmacy and they will forward the refill request to us. Please allow 3 business days for your refill to be completed.          Additional Information About Your Visit        [employer] Information     [employer] lets you send messages to your doctor, view your test results, renew your prescriptions, schedule appointments and more. To sign up, go to [email or web address] . Click on \"Log in\" on the left side of the screen, which will take you to the Welcome page. Then click on \"Sign up Now\" on the right side of the page.     You will be asked to enter the access code listed " "below, as well as some personal information. Please follow the directions to create your username and password.     Your access code is: [email or web address]  Expires: 2018 10:47 AM     Your access code will  in 90 days. If you need help or a new code, please call your [facility] clinic or [phone].        Care EveryWhere ID     This is your Care EveryWhere ID. This could be used by other organizations to access your [facility] medical records  [id number]        Your Vitals Were     Pulse Temperature Respirations Height Last Period Pulse Oximetry    82 98  F (36.7  C) (Oral) 18 5' 7.5\" (1.715 m) 2012 98%    BMI (Body Mass Index)                   25.46 kg/m2            Blood Pressure from Last 3 Encounters:   18 104/69   18 110/78   18 109/68    Weight from Last 3 Encounters:   18 165 lb (74.8 kg)   18 165 lb (74.8 kg)   18 166 lb (75.3 kg)              Today, you had the following     No orders found for display       Primary Care Provider Office Phone # Fax #    Emile Jara [phone] [address] [address]        Equal Access to Services     [facility]: Hadii aad ku hadasho Soomaali, waaxda luqadaha, qaybta kaalmada adeegyada, joleen henning . So [facility] [phone].    ATENCIÓN: Si habla español, tiene a sanchez disposición servicios gratuitos de asistencia lingüística. Llshantel al [phone].    We comply with applicable federal civil rights laws and [place] laws. We do not discriminate on the basis of race, color, national origin, age, disability, sex, sexual orientation, or gender identity.            Thank you!     Thank you for choosing [facility]  for your care. Our goal is always to provide you with excellent care. Hearing back from our patients is one way we can continue to improve our services. Please take a few minutes to complete the written survey that you may receive " in the mail after your visit with us. Thank you!             Your Updated Medication List - Protect others around you: Learn how to safely use, store and throw away your medicines at www.disposemymeds.org.          This list is accurate as of 2/5/18  4:09 PM.  Always use your most recent med list.                   Brand Name Dispense Instructions for use Diagnosis    cholecalciferol 1000 UNIT tablet    vitamin D3    100 tablet    Take 1 tablet (1,000 Units) by mouth daily        CLARITIN 10 MG tablet   Generic drug:  loratadine     30 tablet    Take 1 tablet (10 mg) by mouth daily        clonazePAM 1 MG tablet    klonoPIN    90 tablet    Take 1 tablet (1 mg) by mouth At Bedtime    Anxiety       levothyroxine 88 MCG tablet    SYNTHROID/LEVOTHROID    90 tablet    **CAN FILL 3-30-17**TAKE 1 TABLET (88 MCG) BY MOUTH DAILY    Hypothyroidism, unspecified type       TOPAMAX 200 MG tablet   Generic drug:  topiramate      Take 200 mg by mouth daily        vortioxetine 10 MG tablet    BRINTELLIX    90 tablet    Take 1 tablet (10 mg) by mouth daily    Major depressive disorder, recurrent episode, in full remission (H)       zolpidem 5 MG tablet    AMBIEN    30 tablet    TAKE 1 TABLET BY MOUTH AT BEDTIME AS NEEDED    Chronic insomnia
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
## 2018-02-08 ENCOUNTER — OFFICE VISIT (OUTPATIENT)
Dept: OBGYN | Facility: CLINIC | Age: 53
End: 2018-02-08
Attending: OBSTETRICS & GYNECOLOGY
Payer: COMMERCIAL

## 2018-02-08 ENCOUNTER — RADIANT APPOINTMENT (OUTPATIENT)
Dept: MAMMOGRAPHY | Facility: CLINIC | Age: 53
End: 2018-02-08
Attending: OBSTETRICS & GYNECOLOGY
Payer: COMMERCIAL

## 2018-02-08 VITALS
HEIGHT: 68 IN | BODY MASS INDEX: 25.16 KG/M2 | SYSTOLIC BLOOD PRESSURE: 106 MMHG | WEIGHT: 166 LBS | HEART RATE: 64 BPM | DIASTOLIC BLOOD PRESSURE: 64 MMHG

## 2018-02-08 DIAGNOSIS — N30.10 INTERSTITIAL CYSTITIS: ICD-10-CM

## 2018-02-08 DIAGNOSIS — Z01.419 ENCOUNTER FOR GYNECOLOGICAL EXAMINATION WITHOUT ABNORMAL FINDING: Primary | ICD-10-CM

## 2018-02-08 DIAGNOSIS — E03.9 HYPOTHYROIDISM, UNSPECIFIED TYPE: ICD-10-CM

## 2018-02-08 DIAGNOSIS — Z12.31 VISIT FOR SCREENING MAMMOGRAM: ICD-10-CM

## 2018-02-08 PROCEDURE — 87624 HPV HI-RISK TYP POOLED RSLT: CPT | Performed by: OBSTETRICS & GYNECOLOGY

## 2018-02-08 PROCEDURE — 77067 SCR MAMMO BI INCL CAD: CPT | Mod: TC

## 2018-02-08 PROCEDURE — G0145 SCR C/V CYTO,THINLAYER,RESCR: HCPCS | Performed by: OBSTETRICS & GYNECOLOGY

## 2018-02-08 PROCEDURE — 99396 PREV VISIT EST AGE 40-64: CPT | Performed by: OBSTETRICS & GYNECOLOGY

## 2018-02-08 ASSESSMENT — ANXIETY QUESTIONNAIRES
IF YOU CHECKED OFF ANY PROBLEMS ON THIS QUESTIONNAIRE, HOW DIFFICULT HAVE THESE PROBLEMS MADE IT FOR YOU TO DO YOUR WORK, TAKE CARE OF THINGS AT HOME, OR GET ALONG WITH OTHER PEOPLE: SOMEWHAT DIFFICULT
3. WORRYING TOO MUCH ABOUT DIFFERENT THINGS: NEARLY EVERY DAY
7. FEELING AFRAID AS IF SOMETHING AWFUL MIGHT HAPPEN: NOT AT ALL
6. BECOMING EASILY ANNOYED OR IRRITABLE: NOT AT ALL
2. NOT BEING ABLE TO STOP OR CONTROL WORRYING: NEARLY EVERY DAY
5. BEING SO RESTLESS THAT IT IS HARD TO SIT STILL: NOT AT ALL
GAD7 TOTAL SCORE: 9
1. FEELING NERVOUS, ANXIOUS, OR ON EDGE: NEARLY EVERY DAY

## 2018-02-08 ASSESSMENT — PATIENT HEALTH QUESTIONNAIRE - PHQ9: 5. POOR APPETITE OR OVEREATING: NOT AT ALL

## 2018-02-08 NOTE — PROGRESS NOTES
Vero is a 52 year old  female who presents for annual exam.     Besides routine health maintenance,  she would like to discuss breast tenderness.    HPI:  The patient's PCP is Emile Jara MD.    Last TSH high. Waiting for Golieb to call and change dose. Feels tired.  Interstim not working so good lately. Sees Sasha.   Has bilateral breast tenderness for years.      GYNECOLOGIC HISTORY:    Patient's last menstrual period was 2012.  Her current contraception method is: menopause.  She  reports that she has never smoked. She has never used smokeless tobacco.    Patient is not sexually active.  STD testing offered?  Declined  Last PHQ-9 score on record =   PHQ-9 SCORE 2018   Total Score -   Total Score 6     Last GAD7 score on record =   BASILIO-7 SCORE 2018   Total Score 9     Alcohol Score = 0    HEALTH MAINTENANCE:  Cholesterol: (  Cholesterol   Date Value Ref Range Status   2018 183 <200 mg/dL Final   2014 151 <200 mg/dL Final     Comment:     LDL Cholesterol is the primary guide to therapy.   The NCEP recommends further evaluation of: patients with cholesterol greater   than 200 mg/dL if additional risk factors are present, cholesterol greater   than   240 mg/dL, triglycerides greater than 150 mg/dL, or HDL less than 40 mg/dL.        Last Mammo: one year ago, Result: normal, Next Mammo: today   Pap: 2012 WNL HPV-  Colonoscopy:  2016, Result: normal, Next Colonoscopy: 4 years.  Dexa:  NA    Health maintenance updated:  yes    HISTORY:  Obstetric History       T1      L1     SAB0   TAB0   Ectopic0   Multiple0   Live Births0       # Outcome Date GA Lbr Demetris/2nd Weight Sex Delivery Anes PTL Lv   1 Term                   Patient Active Problem List   Diagnosis     Fibromyalgia     Restless leg syndrome     Interstitial cystitis     Migraines     Hypothyroid     CARDIOVASCULAR SCREENING; LDL GOAL LESS THAN 160     Depression, major, in partial remission (H)      "Anxiety     Adenomatous polyp of colon     Chronic insomnia     Past Surgical History:   Procedure Laterality Date     interstim procedure  2015, 2010 and 2008    [facility]      Social History   Substance Use Topics     Smoking status: Never Smoker     Smokeless tobacco: Never Used     Alcohol use No      Problem (# of Occurrences) Relation (Name,Age of Onset)    Depression (2) Sister, Father    MENTAL ILLNESS (1) Mother    OSTEOPOROSIS (1) Maternal Grandmother            Current Outpatient Prescriptions   Medication Sig     levothyroxine (SYNTHROID/LEVOTHROID) 88 MCG tablet **CAN FILL 3-30-17**TAKE 1 TABLET (88 MCG) BY MOUTH DAILY     zolpidem (AMBIEN) 5 MG tablet TAKE 1 TABLET BY MOUTH AT BEDTIME AS NEEDED     clonazePAM (KLONOPIN) 1 MG tablet Take 1 tablet (1 mg) by mouth At Bedtime     vortioxetine (BRINTELLIX) 10 MG tablet Take 1 tablet (10 mg) by mouth daily     loratadine (CLARITIN) 10 MG tablet Take 1 tablet (10 mg) by mouth daily     cholecalciferol (VITAMIN D) 1000 UNIT tablet Take 1 tablet (1,000 Units) by mouth daily     topiramate (TOPAMAX) 200 MG tablet Take 200 mg by mouth daily      No current facility-administered medications for this visit.      Allergies   Allergen Reactions     Adhesive Tape Rash       Past medical, surgical, social and family histories were reviewed and updated in EPIC.    ROS:   12 point review of systems negative other than symptoms noted below.  Constitutional: Fatigue  Breast: Tenderness  Genitourinary: Urgency  Psychiatric: Anxiety    EXAM:  /64  Pulse 64  Ht 5' 7.5\" (1.715 m)  Wt 166 lb (75.3 kg)  LMP 06/27/2012  Breastfeeding? No  BMI 25.62 kg/m2   BMI: Body mass index is 25.62 kg/(m^2).    PHYSICAL EXAM:  Constitutional:  Appearance: Well nourished, well developed, alert, in no acute distress  Neck:  Lymph Nodes:  No lymphadenopathy present    Thyroid:  Gland size normal, nontender, no nodules or masses present  on palpation  Chest:  Respiratory Effort:  " Breathing unlabored  Cardiovascular:    Heart: Auscultation:  Regular rate, normal rhythm, no murmurs present  Breasts: Inspection of Breasts:  No lymphadenopathy present., Palpation of Breasts and Axillae:  No masses present on palpation, no breast tenderness., Axillary Lymph Nodes:  No lymphadenopathy present. and No nodularity, asymmetry or nipple discharge bilaterally.  Gastrointestinal:   Abdominal Examination:  Abdomen nontender to palpation, tone normal without rigidity or guarding, no masses present, umbilicus without lesions   Liver and Spleen:  No hepatomegaly present, liver nontender to palpation    Hernias:  No hernias present  Lymphatic: Lymph Nodes:  No other lymphadenopathy present  Skin:  General Inspection:  No rashes present, no lesions present, no areas of  discoloration    Genitalia and Groin:  No rashes present, no lesions present, no areas of  discoloration, no masses present  Neurologic/Psychiatric:    Mental Status:  Oriented X3     Pelvic Exam:  External Genitalia:     Normal appearance for age, no discharge present, no tenderness present, no inflammatory lesions present, color normal  Vagina:     Normal vaginal vault without central or paravaginal defects, no discharge present, no inflammatory lesions present, no masses present  Bladder:     Nontender to palpation  Urethra:   Urethral Body:  Urethra palpation normal, urethra structural support normal   Urethral Meatus:  No erythema or lesions present  Cervix:     Appearance healthy, no lesions present, nontender to palpation, no bleeding present  Uterus:     Uterus: firm, normal sized and nontender, anteverted in position.   Adnexa:     No adnexal tenderness present, no adnexal masses present  Perineum:     Perineum within normal limits, no evidence of trauma, no rashes or skin lesions present  Anus:     Anus within normal limits, no hemorrhoids present  Inguinal Lymph Nodes:     No lymphadenopathy present  Pubic Hair:     Normal pubic hair  distribution for age  Genitalia and Groin:     No rashes present, no lesions present, no areas of discoloration, no masses present      COUNSELING:   Reviewed preventive health counseling, as reflected in patient instructions       Regular exercise    BMI: Body mass index is 25.62 kg/(m^2).  Weight management plan: Encouraged weight loss    ASSESSMENT:  52 year old female with satisfactory annual exam.    ICD-10-CM    1. Encounter for gynecological examination without abnormal finding Z01.419 Pap imaged thin layer screen with HPV - recommended age 30 - 65     HPV High Risk Types DNA Cervical   2. Hypothyroidism, unspecified type E03.9    3. Interstitial cystitis N30.10        PLAN:  Discussed Botox injections in bladder. Will discuss with Sasha.    Chang Benz MD

## 2018-02-08 NOTE — LETTER
February 19, 2018    Vero Lua  [address] [place] [address]    Dear Vero,  We are happy to inform you that your PAP smear result from 2/8/18 is normal.  We are now able to do a follow up test on PAP smears. The DNA test is for HPV (Human Papilloma Virus). Cervical cancer is closely linked with certain types of HPV. Your result showed no evidence of high risk HPV.  Therefore we recommend you return in 3 years for your next pap smear.  You will still need to return to the clinic every year for an annual exam and other preventive tests.  Please contact the clinic at [phone] with any questions.  Sincerely,    Chang Benz MD/nadege

## 2018-02-08 NOTE — MR AVS SNAPSHOT
"              After Visit Summary   2018    Vero Lua    MRN: [id number]           Patient Information     Date Of Birth          1965        Visit Information        Provider Department      2018 8:20 AM Chang Benz MD [facility]        Today's Diagnoses     Encounter for gynecological examination without abnormal finding    -  1    Hypothyroidism, unspecified type        Interstitial cystitis           Follow-ups after your visit        Who to contact     If you have questions or need follow up information about today's clinic visit or your schedule please contact [facility] directly at [phone].  Normal or non-critical lab and imaging results will be communicated to you by MyChart, letter or phone within 4 business days after the clinic has received the results. If you do not hear from us within 7 days, please contact the clinic through Sqoothart or phone. If you have a critical or abnormal lab result, we will notify you by phone as soon as possible.  Submit refill requests through [employer] or call your pharmacy and they will forward the refill request to us. Please allow 3 business days for your refill to be completed.          Additional Information About Your Visit        MyChart Information     [employer]Mom lets you send messages to your doctor, view your test results, renew your prescriptions, schedule appointments and more. To sign up, go to [email or web address] . Click on \"Log in\" on the left side of the screen, which will take you to the Welcome page. Then click on \"Sign up Now\" on the right side of the page.     You will be asked to enter the access code listed below, as well as some personal information. Please follow the directions to create your username and password.     Your access code is: [email or web address]  Expires: 2018 10:47 AM     Your access code will  in 90 days. If you need help or a new code, please call your " "[facility] or [phone].        Care EveryWhere ID     This is your Care EveryWhere ID. This could be used by other organizations to access your [place] medical records  [id number]        Your Vitals Were     Pulse Height Last Period Breastfeeding? BMI (Body Mass Index)       64 5' 7.5\" (1.715 m) 06/27/2012 No 25.62 kg/m2        Blood Pressure from Last 3 Encounters:   02/08/18 106/64   02/05/18 104/69   02/01/18 110/78    Weight from Last 3 Encounters:   02/08/18 166 lb (75.3 kg)   02/05/18 165 lb (74.8 kg)   02/01/18 165 lb (74.8 kg)              We Performed the Following     HPV High Risk Types DNA Cervical     Pap imaged thin layer screen with HPV - recommended age 30 - 65        Primary Care Provider Office Phone # Fax #    Emile Jara [phone] [address] [address]  [facility] [address]        Equal Access to Services     [facility] : Hadii marge ku hadasho Soomaali, waaxda luqadaha, qaybta kaalmada adeegyaac, joleen henning . So [place] [phone].    ATENCIÓN: Si habla español, tiene a sanchez disposición servicios gratuitos de asistencia lingüística. [facility] [phone].    We comply with applicable federal civil rights laws and [place] laws. We do not discriminate on the basis of race, color, national origin, age, disability, sex, sexual orientation, or gender identity.            Thank you!     Thank you for choosing [facility]  for your care. Our goal is always to provide you with excellent care. Hearing back from our patients is one way we can continue to improve our services. Please take a few minutes to complete the written survey that you may receive in the mail after your visit with us. Thank you!             Your Updated Medication List - Protect others around you: Learn how to safely use, store and throw away your medicines at www.disposemymeds.org.          This list is accurate as of 2/8/18  9:15 AM.  " Always use your most recent med list.                   Brand Name Dispense Instructions for use Diagnosis    cholecalciferol 1000 UNIT tablet    vitamin D3    100 tablet    Take 1 tablet (1,000 Units) by mouth daily        CLARITIN 10 MG tablet   Generic drug:  loratadine     30 tablet    Take 1 tablet (10 mg) by mouth daily        clonazePAM 1 MG tablet    klonoPIN    90 tablet    Take 1 tablet (1 mg) by mouth At Bedtime    Anxiety       levothyroxine 88 MCG tablet    SYNTHROID/LEVOTHROID    90 tablet    **CAN FILL 3-30-17**TAKE 1 TABLET (88 MCG) BY MOUTH DAILY    Hypothyroidism, unspecified type       TOPAMAX 200 MG tablet   Generic drug:  topiramate      Take 200 mg by mouth daily        vortioxetine 10 MG tablet    BRINTELLIX    90 tablet    Take 1 tablet (10 mg) by mouth daily    Major depressive disorder, recurrent episode, in full remission (H)       zolpidem 5 MG tablet    AMBIEN    30 tablet    TAKE 1 TABLET BY MOUTH AT BEDTIME AS NEEDED    Chronic insomnia

## 2018-02-09 ASSESSMENT — PATIENT HEALTH QUESTIONNAIRE - PHQ9: SUM OF ALL RESPONSES TO PHQ QUESTIONS 1-9: 6

## 2018-02-09 ASSESSMENT — ANXIETY QUESTIONNAIRES: GAD7 TOTAL SCORE: 9

## 2018-02-12 LAB
COPATH REPORT: NORMAL
PAP: NORMAL

## 2018-02-14 LAB
FINAL DIAGNOSIS: NORMAL
HPV HR 12 DNA CVX QL NAA+PROBE: NEGATIVE
HPV16 DNA SPEC QL NAA+PROBE: NEGATIVE
HPV18 DNA SPEC QL NAA+PROBE: NEGATIVE
SPECIMEN DESCRIPTION: NORMAL
SPECIMEN SOURCE CVX/VAG CYTO: NORMAL

## 2018-03-05 ENCOUNTER — TELEPHONE (OUTPATIENT)
Dept: FAMILY MEDICINE | Facility: CLINIC | Age: 53
End: 2018-03-05

## 2018-03-05 DIAGNOSIS — E03.9 HYPOTHYROIDISM, UNSPECIFIED TYPE: Primary | ICD-10-CM

## 2018-03-05 NOTE — TELEPHONE ENCOUNTER
Reason for Call:  Lab result question     Detailed comments: Pt had lab work on 2/1  She has questions regarding Thyroid level, she is wondering why a change isn't  necessary, she feels like the level is off by a significant range     Phone Number Patient can be reached at: Cell number on file:    Telephone Information:   Mobile [phone]       Best Time: any    Can we leave a detailed message on this number? YES    Call taken on 3/5/2018 at 12:26 PM by Jo Hernandez

## 2018-03-05 NOTE — TELEPHONE ENCOUNTER
To PCP:     Pt scheduled f/u Lab Only appoint for TSH recheck. Lab order pended, please review and authorize if appropriate,     Thank you,   Lidia PEREZ RN

## 2018-03-05 NOTE — TELEPHONE ENCOUNTER
Called patient and left VM with details on message below.     Asked patient to call clinic back to confirm message receipt and to schedule lab only appointment    Lidia PEREZ RN

## 2018-03-05 NOTE — TELEPHONE ENCOUNTER
I believe she had been out of it for 2 days prior to the test which may have affected it.  How about if we repeat the tsh in 3 weeks and go from there?    Emile Jara M.D.

## 2018-04-02 DIAGNOSIS — E03.9 HYPOTHYROIDISM, UNSPECIFIED TYPE: ICD-10-CM

## 2018-04-02 DIAGNOSIS — E03.9 HYPOTHYROIDISM, UNSPECIFIED TYPE: Primary | ICD-10-CM

## 2018-04-02 LAB
T4 FREE SERPL-MCNC: 1.07 NG/DL (ref 0.76–1.46)
TSH SERPL DL<=0.005 MIU/L-ACNC: 8.41 MU/L (ref 0.4–4)

## 2018-04-02 PROCEDURE — 84443 ASSAY THYROID STIM HORMONE: CPT | Performed by: INTERNAL MEDICINE

## 2018-04-02 PROCEDURE — 84439 ASSAY OF FREE THYROXINE: CPT | Performed by: INTERNAL MEDICINE

## 2018-04-02 PROCEDURE — 36415 COLL VENOUS BLD VENIPUNCTURE: CPT | Performed by: INTERNAL MEDICINE

## 2018-04-03 NOTE — TELEPHONE ENCOUNTER
Attempted again, no answer. Did not leave another VM. Left Vm this morning to return call to clinic    Lidia PEREZ RN

## 2018-04-03 NOTE — TELEPHONE ENCOUNTER
Called patient and left Vm to return call to clinic for message from provider.     Lidia PEREZ RN

## 2018-04-03 NOTE — TELEPHONE ENCOUNTER
Please call patient re tsh, we should raise thyroid dose, please confirm dose is 88mcg and pharmacy and I will send in new prescription, then do follow up tsh in 2 to 3 months.  Please route back to me.    Emile Jara M.D.

## 2018-04-04 RX ORDER — LEVOTHYROXINE SODIUM 100 UG/1
100 TABLET ORAL DAILY
Qty: 90 TABLET | Refills: 3 | Status: SHIPPED | OUTPATIENT
Start: 2018-04-04 | End: 2019-04-01

## 2018-04-04 NOTE — TELEPHONE ENCOUNTER
To PCP:  Patient confirms she is currently taking 88 MCG.  Please send new dose to pharmacy.  100 mcg is pended.  Thank you.  Dinora Roy RN

## 2018-04-05 ENCOUNTER — TRANSFERRED RECORDS (OUTPATIENT)
Dept: HEALTH INFORMATION MANAGEMENT | Facility: CLINIC | Age: 53
End: 2018-04-05

## 2018-04-25 ENCOUNTER — TRANSFERRED RECORDS (OUTPATIENT)
Dept: HEALTH INFORMATION MANAGEMENT | Facility: CLINIC | Age: 53
End: 2018-04-25

## 2018-07-03 ENCOUNTER — TRANSFERRED RECORDS (OUTPATIENT)
Dept: HEALTH INFORMATION MANAGEMENT | Facility: CLINIC | Age: 53
End: 2018-07-03

## 2018-07-17 ENCOUNTER — TRANSFERRED RECORDS (OUTPATIENT)
Dept: HEALTH INFORMATION MANAGEMENT | Facility: CLINIC | Age: 53
End: 2018-07-17

## 2018-07-19 ENCOUNTER — OFFICE VISIT (OUTPATIENT)
Dept: FAMILY MEDICINE | Facility: CLINIC | Age: 53
End: 2018-07-19
Payer: COMMERCIAL

## 2018-07-19 VITALS
BODY MASS INDEX: 26.37 KG/M2 | WEIGHT: 174 LBS | SYSTOLIC BLOOD PRESSURE: 114 MMHG | HEART RATE: 71 BPM | DIASTOLIC BLOOD PRESSURE: 81 MMHG | TEMPERATURE: 98.8 F | OXYGEN SATURATION: 100 % | HEIGHT: 68 IN

## 2018-07-19 DIAGNOSIS — E03.9 HYPOTHYROIDISM, UNSPECIFIED TYPE: ICD-10-CM

## 2018-07-19 DIAGNOSIS — M79.672 LEFT FOOT PAIN: ICD-10-CM

## 2018-07-19 DIAGNOSIS — Z11.4 SCREENING FOR HUMAN IMMUNODEFICIENCY VIRUS: ICD-10-CM

## 2018-07-19 DIAGNOSIS — M21.612 BUNION, LEFT: Primary | ICD-10-CM

## 2018-07-19 PROCEDURE — 84443 ASSAY THYROID STIM HORMONE: CPT | Performed by: NURSE PRACTITIONER

## 2018-07-19 PROCEDURE — 99213 OFFICE O/P EST LOW 20 MIN: CPT | Performed by: NURSE PRACTITIONER

## 2018-07-19 PROCEDURE — 87389 HIV-1 AG W/HIV-1&-2 AB AG IA: CPT | Performed by: NURSE PRACTITIONER

## 2018-07-19 PROCEDURE — 36415 COLL VENOUS BLD VENIPUNCTURE: CPT | Performed by: NURSE PRACTITIONER

## 2018-07-19 NOTE — MR AVS SNAPSHOT
After Visit Summary   7/19/2018    Vero Lua    MRN: [id number]           Patient Information     Date Of Birth          1965        Visit Information        Provider Department      7/19/2018 2:00 PM Tiffany Osman APRN CNP [facility]        Today's Diagnoses     Bunion, left    -  1    Left foot pain        Hypothyroidism, unspecified type        Screening for human immunodeficiency virus          Care Instructions      Understanding Bunions    A bunion is a bony bump on the joint at the base of the big toe. A bunion changes the shape of the foot. It can also cause pain and problems using the foot.  A person with a bunion will have a bony bump at the base of the big toe. This is caused by misalignment of the toe joint, causing the bones to sit at an angle instead of straight. The big toe often turns in toward the second toe. In time, the big toe may start to overlap the second toe.    What causes bunions?  It is not clear what causes bunions, but many factors are likely involved. Bunions often run in families. They may be more common in people who have loose joints. Wearing tight shoes may also cause bunions.  Symptoms of bunions  At first, the problem may be painless. As symptoms develop, they may include:    Foot pain or stiffness    Swelling over the joint    Skin irritation or thickened skin over the joint  Treatment for bunions  In most cases, your healthcare provider will try nonsurgical treatments first. Most of these treatments won t cure the bunion, but they can help relieve symptoms and keep the bunion from getting worse. These include:    Wearing low-heeled shoes with a wide toe box. This can help relieve pressure on the bunion and make walking more comfortable.    Using padding or special shoe inserts called orthotics. Inserts can be custom-made for your foot. They help support the foot and may change how the foot is aligned.    Wearing a toe splint at night.  This can help hold the toe in a straighter position.    Putting an ice pack on a swollen joint. This can help reduce pain and swelling.  If the bunion causes severe pain or problems using the foot, your provider may recommend surgery. During surgery, excess bone may be removed and the joint is realigned.     When to call your healthcare provider  Call your healthcare provider right away if you have any of these:    Fever of 100.4 F (38 C) or higher, or as directed    Symptoms that don t get better, or get worse    New symptoms   Date Last Reviewed: 3/10/2016    9184-0922 [employer]. [address] [place] [address]. All rights reserved. This information is not intended as a substitute for professional medical care. Always follow your healthcare professional's instructions.                Follow-ups after your visit        Additional Services     PODIATRY/FOOT & ANKLE SURGERY REFERRAL       Your provider has referred you to: [facility]: [facility] C[facility] Donald [phone]   [email or web address]    Please be aware that coverage of these services is subject to the terms and limitations of your health insurance plan.  Call member services at your health plan with any benefit or coverage questions.      Please bring the following to your appointment:  >>   Any x-rays, CTs or MRIs which have been performed.  Contact the facility where they were done to arrange for  prior to your scheduled appointment.    >>   List of current medications   >>   This referral request   >>   Any documents/labs given to you for this referral                  Who to contact     If you have questions or need follow up information about today's clinic visit or your schedule please contact [facility] directly at [phone].  Normal or non-critical lab and imaging results will be communicated to you by MyChart, letter or phone within 4 business days after the clinic has received the  "results. If you do not hear from us within 7 days, please contact the clinic through [email or web address] or phone. If you have a critical or abnormal lab result, we will notify you by phone as soon as possible.  Submit refill requests through [employer] or call your pharmacy and they will forward the refill request to us. Please allow 3 business days for your refill to be completed.          Additional Information About Your Visit        Care EveryWhere ID     This is your Care EveryWhere ID. This could be used by other organizations to access your [place] medical records  [id number]        Your Vitals Were     Pulse Temperature Height Last Period Pulse Oximetry BMI (Body Mass Index)    71 98.8  F (37.1  C) (Tympanic) 5' 7.5\" (1.715 m) 06/27/2012 100% 26.85 kg/m2       Blood Pressure from Last 3 Encounters:   07/19/18 114/81   02/08/18 106/64   02/05/18 104/69    Weight from Last 3 Encounters:   07/19/18 174 lb (78.9 kg)   02/08/18 166 lb (75.3 kg)   02/05/18 165 lb (74.8 kg)              We Performed the Following     HIV Antigen Antibody Combo     PODIATRY/FOOT & ANKLE SURGERY REFERRAL     TSH with free T4 reflex        Primary Care Provider Office Phone # Fax #    Emile Jara [phone] [address] [address]        Equal Access to Services     [facility]: Hadii aad ku hadasho Soomaali, waaxda luqadaha, qaybta kaalmada adeegyada, joleen henning . So [facility] [phone].    ATENCIÓN: Si habla español, tiene a sanchez disposición servicios gratuitos de asistencia lingüística. Leidyame al [phone].    We comply with applicable federal civil rights laws and [place] laws. We do not discriminate on the basis of race, color, national origin, age, disability, sex, sexual orientation, or gender identity.            Thank you!     Thank you for choosing [facility]  for your care. Our goal is always to provide you with excellent care. Hearing back " from our patients is one way we can continue to improve our services. Please take a few minutes to complete the written survey that you may receive in the mail after your visit with us. Thank you!             Your Updated Medication List - Protect others around you: Learn how to safely use, store and throw away your medicines at www.disposemymeds.org.          This list is accurate as of 7/19/18  2:29 PM.  Always use your most recent med list.                   Brand Name Dispense Instructions for use Diagnosis    cholecalciferol 1000 UNIT tablet    vitamin D3    100 tablet    Take 1 tablet (1,000 Units) by mouth daily        CLARITIN 10 MG tablet   Generic drug:  loratadine     30 tablet    Take 1 tablet (10 mg) by mouth daily        clonazePAM 1 MG tablet    klonoPIN    90 tablet    Take 1 tablet (1 mg) by mouth At Bedtime    Anxiety       levothyroxine 100 MCG tablet    SYNTHROID/LEVOTHROID    90 tablet    Take 1 tablet (100 mcg) by mouth daily    Hypothyroidism, unspecified type       TOPAMAX 200 MG tablet   Generic drug:  topiramate      Take 200 mg by mouth daily        vortioxetine 10 MG tablet    BRINTELLIX    90 tablet    Take 1 tablet (10 mg) by mouth daily    Major depressive disorder, recurrent episode, in full remission (H)       zolpidem 5 MG tablet    AMBIEN    30 tablet    TAKE 1 TABLET BY MOUTH AT BEDTIME AS NEEDED    Chronic insomnia

## 2018-07-19 NOTE — PATIENT INSTRUCTIONS
Understanding Bunions    A bunion is a bony bump on the joint at the base of the big toe. A bunion changes the shape of the foot. It can also cause pain and problems using the foot.  A person with a bunion will have a bony bump at the base of the big toe. This is caused by misalignment of the toe joint, causing the bones to sit at an angle instead of straight. The big toe often turns in toward the second toe. In time, the big toe may start to overlap the second toe.    What causes bunions?  It is not clear what causes bunions, but many factors are likely involved. Bunions often run in families. They may be more common in people who have loose joints. Wearing tight shoes may also cause bunions.  Symptoms of bunions  At first, the problem may be painless. As symptoms develop, they may include:    Foot pain or stiffness    Swelling over the joint    Skin irritation or thickened skin over the joint  Treatment for bunions  In most cases, your healthcare provider will try nonsurgical treatments first. Most of these treatments won t cure the bunion, but they can help relieve symptoms and keep the bunion from getting worse. These include:    Wearing low-heeled shoes with a wide toe box. This can help relieve pressure on the bunion and make walking more comfortable.    Using padding or special shoe inserts called orthotics. Inserts can be custom-made for your foot. They help support the foot and may change how the foot is aligned.    Wearing a toe splint at night. This can help hold the toe in a straighter position.    Putting an ice pack on a swollen joint. This can help reduce pain and swelling.  If the bunion causes severe pain or problems using the foot, your provider may recommend surgery. During surgery, excess bone may be removed and the joint is realigned.     When to call your healthcare provider  Call your healthcare provider right away if you have any of these:    Fever of 100.4 F (38 C) or higher, or as  directed    Symptoms that don t get better, or get worse    New symptoms   Date Last Reviewed: 3/10/2016    2412-0759 [employer]. [address] [facility], [place] [address]. All rights reserved. This information is not intended as a substitute for professional medical care. Always follow your healthcare professional's instructions.

## 2018-07-19 NOTE — PROGRESS NOTES
HPI    SUBJECTIVE:   Vero Lua is a 52 year old female who presents to clinic today for the following health issues:      Musculoskeletal problem/pain      Duration: 2-3 weeks    Description  Location: Left foot    Intensity:  severe    Accompanying signs and symptoms: Sharp pain in joints     History  Previous similar problem: no   Previous evaluation:  none    Precipitating or alleviating factors:  Trauma or overuse: no   Aggravating factors include: while laying down it worsens     Therapies tried and outcome: NSAID - Aleve      Couple weeks ago woke up with pain of left 1st MTP. Got a lot better over the next week  Does have a little pain currently   Pain is worse when she goes to sleep   No fevers  No injury   No change of shoes.  No paresthesias. But the pain is a burning pain at times      Past Medical History:   Diagnosis Date     Adenomatous polyp of colon 5/16    fu 5 years, mn gi     Anxiety      Chronic insomnia     on ambien and klonopin for over 10 years together     Depression, major, in partial remission (H)     was seeing psyche until 2015, not since then     Fibromyalgia 1998     Hypothyroid 90's     Interstitial cystitis     urol [place] and had interstem     Migraines     Dr. Cash of neuro, got botox 2012 via [facility] Dr. Kevin, now Dr. Watkins     Restless leg syndrome      Urgency of urination     interstim     Family History   Problem Relation Age of Onset     Mental Illness Mother      Depression Sister      Depression Father      Osteoperosis Maternal Grandmother      Past Surgical History:   Procedure Laterality Date     interstim procedure  2015, 2010 and 2008    [facility]     Social History   Substance Use Topics     Smoking status: Never Smoker     Smokeless tobacco: Never Used     Alcohol use No     Current Outpatient Prescriptions   Medication Sig Dispense Refill     cholecalciferol (VITAMIN D) 1000 UNIT tablet Take 1 tablet (1,000 Units) by mouth daily 100 tablet 3     clonazePAM  "(KLONOPIN) 1 MG tablet Take 1 tablet (1 mg) by mouth At Bedtime 90 tablet 0     levothyroxine (SYNTHROID/LEVOTHROID) 100 MCG tablet Take 1 tablet (100 mcg) by mouth daily 90 tablet 3     loratadine (CLARITIN) 10 MG tablet Take 1 tablet (10 mg) by mouth daily 30 tablet 1     topiramate (TOPAMAX) 200 MG tablet Take 200 mg by mouth daily        vortioxetine (BRINTELLIX) 10 MG tablet Take 1 tablet (10 mg) by mouth daily 90 tablet 3     zolpidem (AMBIEN) 5 MG tablet TAKE 1 TABLET BY MOUTH AT BEDTIME AS NEEDED 30 tablet 0     [DISCONTINUED] levothyroxine (SYNTHROID/LEVOTHROID) 88 MCG tablet **CAN FILL 3-30-17**TAKE 1 TABLET (88 MCG) BY MOUTH DAILY 90 tablet 3     Allergies   Allergen Reactions     Adhesive Tape Rash       Reviewed and updated as needed this visit by clinical staff and provider     ROS  Detailed as above       /81 (BP Location: Right arm, Cuff Size: Adult Regular)  Pulse 71  Temp 98.8  F (37.1  C) (Tympanic)  Ht 5' 7.5\" (1.715 m)  Wt 174 lb (78.9 kg)  LMP 06/27/2012  SpO2 100%  BMI 26.85 kg/m2      Physical Exam   Constitutional: She is well-developed, well-nourished, and in no distress.   HENT:   Head: Normocephalic.   Eyes: Conjunctivae are normal.   Pulmonary/Chest: Effort normal.   Musculoskeletal: Normal range of motion. She exhibits no edema.   Bilateral bunions  No significant tenderness of bilateral 1st MTPs   Neurological: She is alert.   Skin: Skin is warm and dry. No rash noted. No erythema.   Psychiatric: Mood and affect normal.   Vitals reviewed.      Assessment and Plan:       ICD-10-CM    1. Bunion, left M21.612 PODIATRY/FOOT & ANKLE SURGERY REFERRAL   2. Left foot pain M79.672 PODIATRY/FOOT & ANKLE SURGERY REFERRAL   3. Hypothyroidism, unspecified type E03.9 TSH with free T4 reflex   4. Screening for human immunodeficiency virus Z11.4 HIV Antigen Antibody Combo       Suspect pain r/t bunion and possible capsulitis. Recommend getting new shoes. No barefoot walking. F/u podiatry " if not improving   Due for repeat TSH which will be done today   Recommended routine screen for HIV also done today       Tiffany Osman, APRN, CNP  [facility]

## 2018-07-19 NOTE — LETTER
[place] [address] [facility]  [address]  [place]  [address]  Tel: [phone]    July 20, 2018    Vero Lua  [address]  [place] [address]        Dear Ms. Lua,    Thyroid looks great. You can continue your current synthroid dose   Hiv screen in negative     If you have any further questions or problems, please contact our office.      Sincerely,    Tiffany Osman NP/gen    Results for orders placed or performed in visit on 07/19/18   TSH with free T4 reflex   Result Value Ref Range    TSH 3.04 0.40 - 4.00 mU/L   HIV Antigen Antibody Combo   Result Value Ref Range    HIV Antigen Antibody Combo Nonreactive NR^Nonreactive               Enclosure: Lab Results

## 2018-07-20 LAB
HIV 1+2 AB+HIV1 P24 AG SERPL QL IA: NONREACTIVE
TSH SERPL DL<=0.005 MIU/L-ACNC: 3.04 MU/L (ref 0.4–4)

## 2018-07-20 NOTE — PROGRESS NOTES
Vero  Thyroid looks great. You can continue your current synthroid dose   Hiv screen in negative   Tiffany

## 2018-09-17 ENCOUNTER — OFFICE VISIT (OUTPATIENT)
Dept: FAMILY MEDICINE | Facility: CLINIC | Age: 53
End: 2018-09-17
Payer: COMMERCIAL

## 2018-09-17 VITALS
DIASTOLIC BLOOD PRESSURE: 78 MMHG | TEMPERATURE: 98 F | OXYGEN SATURATION: 98 % | HEART RATE: 78 BPM | SYSTOLIC BLOOD PRESSURE: 104 MMHG

## 2018-09-17 DIAGNOSIS — R30.0 DYSURIA: Primary | ICD-10-CM

## 2018-09-17 PROCEDURE — 81003 URINALYSIS AUTO W/O SCOPE: CPT | Performed by: INTERNAL MEDICINE

## 2018-09-17 PROCEDURE — 99213 OFFICE O/P EST LOW 20 MIN: CPT | Performed by: INTERNAL MEDICINE

## 2018-09-17 NOTE — MR AVS SNAPSHOT
After Visit Summary   9/17/2018    Vero Lua    MRN: [id number]           Patient Information     Date Of Birth          1965        Visit Information        Provider Department      9/17/2018 11:20 AM Benra Hawk MD [facility]        Today's Diagnoses     Dysuria    -  1       Follow-ups after your visit        Who to contact     If you have questions or need follow up information about today's clinic visit or your schedule please contact [facility] directly at [phone].  Normal or non-critical lab and imaging results will be communicated to you by MyChart, letter or phone within 4 business days after the clinic has received the results. If you do not hear from us within 7 days, please contact the clinic through MyChart or phone. If you have a critical or abnormal lab result, we will notify you by phone as soon as possible.  Submit refill requests through [email or web address] or call your pharmacy and they will forward the refill request to us. Please allow 3 business days for your refill to be completed.          Additional Information About Your Visit        Care EveryWhere ID     This is your Care EveryWhere ID. This could be used by other organizations to access your [place] medical records  [id number]        Your Vitals Were     Pulse Temperature Last Period Pulse Oximetry Breastfeeding?       78 98  F (36.7  C) (Oral) 06/27/2012 98% No        Blood Pressure from Last 3 Encounters:   09/17/18 104/78   07/19/18 114/81   02/08/18 106/64    Weight from Last 3 Encounters:   07/19/18 174 lb (78.9 kg)   02/08/18 166 lb (75.3 kg)   02/05/18 165 lb (74.8 kg)              We Performed the Following     *UA reflex to Microscopic and Culture ([place] [facility] (except [place] and [place])        Primary Care Provider Office Phone # Fax #    Emile Jara [phone] [address]  [place] [address]        Equal Access to  Services     : Hadii aad ku hadkalyanisuzanna Ray, waaxda luqadaha, qaybta kaalmada shelly, joleen henning . So [facility] [phone].    ATENCIÓN: Si giancarlola nel, tiene a sanchez disposición servicios gratuitos de asistencia lingüística. Llame al [phone].    We comply with applicable federal civil rights laws and [place] laws. We do not discriminate on the basis of race, color, national origin, age, disability, sex, sexual orientation, or gender identity.            Thank you!     Thank you for choosing [facility]  for your care. Our goal is always to provide you with excellent care. Hearing back from our patients is one way we can continue to improve our services. Please take a few minutes to complete the written survey that you may receive in the mail after your visit with us. Thank you!             Your Updated Medication List - Protect others around you: Learn how to safely use, store and throw away your medicines at www.disposemymeds.org.          This list is accurate as of 9/17/18 12:19 PM.  Always use your most recent med list.                   Brand Name Dispense Instructions for use Diagnosis    cholecalciferol 1000 UNIT tablet    vitamin D3    100 tablet    Take 1 tablet (1,000 Units) by mouth daily        CLARITIN 10 MG tablet   Generic drug:  loratadine     30 tablet    Take 1 tablet (10 mg) by mouth daily        clonazePAM 1 MG tablet    klonoPIN    90 tablet    Take 1 tablet (1 mg) by mouth At Bedtime    Anxiety       levothyroxine 100 MCG tablet    SYNTHROID/LEVOTHROID    90 tablet    Take 1 tablet (100 mcg) by mouth daily    Hypothyroidism, unspecified type       TOPAMAX 200 MG tablet   Generic drug:  topiramate      Take 200 mg by mouth daily        vortioxetine 10 MG tablet    BRINTELLIX    90 tablet    Take 1 tablet (10 mg) by mouth daily    Major depressive disorder, recurrent episode, in full remission (H)       zolpidem 5 MG tablet    AMBIEN     30 tablet    TAKE 1 TABLET BY MOUTH AT BEDTIME AS NEEDED    Chronic insomnia

## 2018-09-17 NOTE — PROGRESS NOTES
SUBJECTIVE:   Vero Lua is a 53 year old female who presents to clinic today for the following health issues:      Chief Complaint   Patient presents with     UTI   having increased frequency and dysuria. Concerned for UTI.  Patient reports a history of interstitial cystitis.        Current Medications:     Current Outpatient Prescriptions   Medication Sig Dispense Refill     cholecalciferol (VITAMIN D) 1000 UNIT tablet Take 1 tablet (1,000 Units) by mouth daily 100 tablet 3     clonazePAM (KLONOPIN) 1 MG tablet Take 1 tablet (1 mg) by mouth At Bedtime 90 tablet 0     levothyroxine (SYNTHROID/LEVOTHROID) 100 MCG tablet Take 1 tablet (100 mcg) by mouth daily 90 tablet 3     loratadine (CLARITIN) 10 MG tablet Take 1 tablet (10 mg) by mouth daily 30 tablet 1     topiramate (TOPAMAX) 200 MG tablet Take 200 mg by mouth daily        vortioxetine (BRINTELLIX) 10 MG tablet Take 1 tablet (10 mg) by mouth daily 90 tablet 3     zolpidem (AMBIEN) 5 MG tablet TAKE 1 TABLET BY MOUTH AT BEDTIME AS NEEDED 30 tablet 0         Allergies:      Allergies   Allergen Reactions     Adhesive Tape Rash            Past Medical History:     Past Medical History:   Diagnosis Date     Adenomatous polyp of colon 5/16    fu 5 years, mn gi     Anxiety      Chronic insomnia     on ambien and klonopin for over 10 years together     Depression, major, in partial remission (H)     was seeing psyche until 2015, not since then     Fibromyalgia 1998     Hypothyroid 90's     Interstitial cystitis     urol [place] and had interstem     Migraines     Dr. Cash of neuro, got botox 2012 via [facility] Dr. Kevin, now Dr. Watkins     Restless leg syndrome      Urgency of urination     interstim         Past Surgical History:     Past Surgical History:   Procedure Laterality Date     interstim procedure  2015, 2010 and 2008    [facility], operated by [facility]         Family Medical History:     Family History   Problem Relation Age of Onset     Mental Illness Mother       Depression Sister      Depression Father      Osteoporosis Maternal Grandmother          Social History:     Social History     Social History     Marital status:      Spouse name: N/A     Number of children: 1     Years of education: N/A     Occupational History     homemaker None      Social History Main Topics     Smoking status: Never Smoker     Smokeless tobacco: Never Used     Alcohol use No     Drug use: No     Sexual activity: Yes     Partners: Male     Birth control/ protection: Post-menopausal     Other Topics Concern     Not on file     Social History Narrative           Review of System:     Constitutional: Negative for fever or chills  Skin: Negative for rashes  Ears/Nose/Throat: Negative for nasal congestion, sore throat  Respiratory: No shortness of breath, dyspnea on exertion, cough, or hemoptysis  Cardiovascular: Negative for chest pain  Gastrointestinal: Negative for nausea, vomiting  Genitourinary: Positive for dysuria  Musculoskeletal: Negative for myalgias  Neurologic: Negative for headaches  Psychiatric: Negative for depression, anxiety  Hematologic/Lymphatic/Immunologic: Negative  Endocrine: Negative  Behavioral: Negative for tobacco use       Physical Exam:   /78  Pulse 78  Temp 98  F (36.7  C) (Oral)  LMP 06/27/2012  SpO2 98%  Breastfeeding? No    GENERAL: alert and no distress  EYES: eyes grossly normal to inspection, and conjunctivae and sclerae normal  HENT: Normocephalic atraumatic. Nose and mouth without ulcers or lesions  NECK: supple  RESP: lungs clear to auscultation   CV: regular rate and rhythm, normal S1 S2  LYMPH: no peripheral edema   ABDOMEN: nondistended  MS: no gross musculoskeletal defects noted  SKIN: no suspicious lesions or rashes  NEURO: Alert & Oriented x 3.   PSYCH: mentation appears normal, affect normal        Diagnostic Test Results:     Diagnostic Test Results:  Results for orders placed or performed in visit on 09/17/18 (from the past 24 hour(s))    *UA reflex to Microscopic and Culture ([place] [facility] (except [place] and [place])   Result Value Ref Range    Color Urine Yellow     Appearance Urine Clear     Glucose Urine Negative NEG^Negative mg/dL    Bilirubin Urine Negative NEG^Negative    Ketones Urine Negative NEG^Negative mg/dL    Specific Gravity Urine <=1.005 1.003 - 1.035    Blood Urine Negative NEG^Negative    pH Urine 6.0 5.0 - 7.0 pH    Protein Albumin Urine Negative NEG^Negative mg/dL    Urobilinogen Urine 0.2 0.2 - 1.0 EU/dL    Nitrite Urine Negative NEG^Negative    Leukocyte Esterase Urine Negative NEG^Negative    Source Urine        ASSESSMENT/PLAN:       (R30.0) Dysuria  (primary encounter diagnosis)  Comment: recent new dysuria symptoms  Plan: I have ordered lab for UA reflex to Microscopic and Culture ([place] and [facility] (except [place] and [place]), which is negative for acute UTI. No antibiotics therapy is warranted at this time.    Follow Up Plan:     Patient is instructed to return to Internal Medicine clinic for follow-up visit in 1 week.        Berna Hawk MD  Internal Medicine  [facility]

## 2018-10-10 ENCOUNTER — TRANSFERRED RECORDS (OUTPATIENT)
Dept: HEALTH INFORMATION MANAGEMENT | Facility: CLINIC | Age: 53
End: 2018-10-10

## 2019-03-25 ENCOUNTER — TRANSFERRED RECORDS (OUTPATIENT)
Dept: HEALTH INFORMATION MANAGEMENT | Facility: CLINIC | Age: 54
End: 2019-03-25

## 2019-04-04 ENCOUNTER — TELEPHONE (OUTPATIENT)
Dept: FAMILY MEDICINE | Facility: CLINIC | Age: 54
End: 2019-04-04

## 2019-04-04 NOTE — TELEPHONE ENCOUNTER
PCP,    Are you willing to reorder Rx as 90 day supply?  Pt does not have OV scheduled with you yet.    Please advise    Thank you,  Amos SMITH RN

## 2019-04-04 NOTE — TELEPHONE ENCOUNTER
Fax received from pharmacy regarding Levothyroxine RX, pharmacy states pt's insurance requires a day day supply     Disp Refills Start End HUGH   levothyroxine (SYNTHROID/LEVOTHROID) 100 MCG tablet 30 tablet 0 4/2/2019  No   Sig - Route: TAKE 1 TABLET (100 MCG) BY MOUTH DAILY - Oral       Bernice Reza RT(R)

## 2019-04-19 NOTE — PROGRESS NOTES
Vero is a 53 year old  female who presents for annual exam.     Besides routine health maintenance, she has no other health concerns today .    HPI:  The patient's PCP is Emile Jara MD.    Sees Kam for Interstim. Not working the best.   No GYN issues.       GYNECOLOGIC HISTORY:    Patient's last menstrual period was 2012.  Her current contraception method is: menopause.  She  reports that she has never smoked. She has never used smokeless tobacco.    Patient is sexually active.  STD testing offered?  Declined  Last PHQ-9 score on record =   PHQ-9 SCORE 2019   PHQ-9 Total Score -   PHQ-9 Total Score 0     Last GAD7 score on record =   BASILIO-7 SCORE 2019   Total Score 0     Alcohol Score = 0    HEALTH MAINTENANCE:  Cholesterol: 18   Total= 183, Triglycerides=93, HDL=77, LDL=87,   Last Mammo: 18, Result: normal, Next Mammo: today   Pap: HPV: negative  Lab Results   Component Value Date    PAP NIL 2018     Colonoscopy:  16, Result: Polyps, Next Colonoscopy: .  Dexa:  never    Health maintenance updated:  yes    HISTORY:  OB History    Para Term  AB Living   1 1 1 0 0 1   SAB TAB Ectopic Multiple Live Births   0 0 0 0 0      # Outcome Date GA Lbr Demetris/2nd Weight Sex Delivery Anes PTL Lv   1 Term                Patient Active Problem List   Diagnosis     Fibromyalgia     Restless leg syndrome     Interstitial cystitis     Migraines     Hypothyroid     CARDIOVASCULAR SCREENING; LDL GOAL LESS THAN 160     Depression, major, in partial remission (H)     Anxiety     Adenomatous polyp of colon     Chronic insomnia     Past Surgical History:   Procedure Laterality Date     interstim procedure  ,  and     [facility]      Social History     Tobacco Use     Smoking status: Never Smoker     Smokeless tobacco: Never Used   Substance Use Topics     Alcohol use: No     Alcohol/week: 0.0 oz      Problem (# of Occurrences) Relation (Name,Age of Onset)     "Depression (2) Sister, Father    Mental Illness (1) Mother    Osteoporosis (1) Maternal Grandmother            Current Outpatient Medications   Medication Sig     cholecalciferol (VITAMIN D) 1000 UNIT tablet Take 1 tablet (1,000 Units) by mouth daily     clonazePAM (KLONOPIN) 1 MG tablet Take 1 tablet (1 mg) by mouth At Bedtime     levothyroxine (SYNTHROID/LEVOTHROID) 100 MCG tablet TAKE 1 TABLET (100 MCG) BY MOUTH DAILY     loratadine (CLARITIN) 10 MG tablet Take 1 tablet (10 mg) by mouth daily     topiramate (TOPAMAX) 200 MG tablet Take 200 mg by mouth daily      vortioxetine (BRINTELLIX) 10 MG tablet Take 1 tablet (10 mg) by mouth daily     zolpidem (AMBIEN) 5 MG tablet TAKE 1 TABLET BY MOUTH AT BEDTIME AS NEEDED     No current facility-administered medications for this visit.      Allergies   Allergen Reactions     Adhesive Tape Rash       Past medical, surgical, social and family histories were reviewed and updated in EPIC.    ROS:   12 point review of systems negative other than symptoms noted below.  Constitutional: Fatigue and Weight Gain  Breast: Tenderness  Genitourinary: Painful Amaya  Endocrine: Cold Intolerance and Decreased Libido    EXAM:  BP 92/70 (BP Location: Right arm, Patient Position: Sitting, Cuff Size: Adult Regular)   Pulse 72   Ht 1.727 m (5' 8\")   Wt 81.2 kg (179 lb)   LMP 06/27/2012   Breastfeeding? No   BMI 27.22 kg/m     BMI: Body mass index is 27.22 kg/m .    PHYSICAL EXAM:  Constitutional:  Appearance: Well nourished, well developed, alert, in no acute distress  Neck:  Lymph Nodes:  No lymphadenopathy present    Thyroid:  Gland size normal, nontender, no nodules or masses present  on palpation  Chest:  Respiratory Effort:  Breathing unlabored  Cardiovascular:    Heart: Auscultation:  Regular rate, normal rhythm, no murmurs present  Breasts: Inspection of Breasts:  No lymphadenopathy present., Palpation of Breasts and Axillae:  No masses present on palpation, no breast " tenderness., Axillary Lymph Nodes:  No lymphadenopathy present. and No nodularity, asymmetry or nipple discharge bilaterally.  Gastrointestinal:   Abdominal Examination:  Abdomen nontender to palpation, tone normal without rigidity or guarding, no masses present, umbilicus without lesions   Liver and Spleen:  No hepatomegaly present, liver nontender to palpation    Hernias:  No hernias present  Lymphatic: Lymph Nodes:  No other lymphadenopathy present  Skin:  General Inspection:  No rashes present, no lesions present, no areas of  discoloration    Genitalia and Groin:  No rashes present, no lesions present, no areas of  discoloration, no masses present  Neurologic/Psychiatric:    Mental Status:  Oriented X3     Pelvic Exam:  External Genitalia:     Normal appearance for age, no discharge present, no tenderness present, no inflammatory lesions present, color normal  Vagina:     Normal vaginal vault without central or paravaginal defects, ATROPHIC  Bladder:     Nontender to palpation  Urethra:   Urethral Body:  Urethra palpation normal, urethra structural support normal   Urethral Meatus:  No erythema or lesions present  Cervix:     Appearance healthy, no lesions present, nontender to palpation, no bleeding present  Uterus:     Nontender to palpation, no masses present, position anteflexed, mobility: normal  Adnexa:     No adnexal tenderness present, no adnexal masses present  Perineum:     Perineum within normal limits, no evidence of trauma, no rashes or skin lesions present  Inguinal Lymph Nodes:     No lymphadenopathy present      COUNSELING:   Reviewed preventive health counseling, as reflected in patient instructions       Regular exercise    BMI: Body mass index is 27.22 kg/m .  Weight management plan: Patient was referred to their PCP to discuss a diet and exercise plan.    ASSESSMENT:  53 year old female with satisfactory annual exam.    ICD-10-CM    1. Encounter for gynecological examination without abnormal  finding Z01.419    2. Interstitial cystitis N30.10    3. Hypothyroidism, unspecified type E03.9    4. Recurrent major depressive disorder, in partial remission (H) F33.41    5. Fibromyalgia M79.7        PLAN:  See Eric when scheduled for thyroid.  Sees Sasha annually.        Chang Benz MD

## 2019-04-23 ENCOUNTER — ANCILLARY PROCEDURE (OUTPATIENT)
Dept: MAMMOGRAPHY | Facility: CLINIC | Age: 54
End: 2019-04-23
Attending: OBSTETRICS & GYNECOLOGY
Payer: COMMERCIAL

## 2019-04-23 ENCOUNTER — OFFICE VISIT (OUTPATIENT)
Dept: OBGYN | Facility: CLINIC | Age: 54
End: 2019-04-23
Attending: OBSTETRICS & GYNECOLOGY
Payer: COMMERCIAL

## 2019-04-23 VITALS
HEIGHT: 68 IN | WEIGHT: 179 LBS | SYSTOLIC BLOOD PRESSURE: 92 MMHG | BODY MASS INDEX: 27.13 KG/M2 | DIASTOLIC BLOOD PRESSURE: 70 MMHG | HEART RATE: 72 BPM

## 2019-04-23 DIAGNOSIS — M79.7 FIBROMYALGIA: ICD-10-CM

## 2019-04-23 DIAGNOSIS — Z01.419 ENCOUNTER FOR GYNECOLOGICAL EXAMINATION WITHOUT ABNORMAL FINDING: Primary | ICD-10-CM

## 2019-04-23 DIAGNOSIS — Z12.31 VISIT FOR SCREENING MAMMOGRAM: ICD-10-CM

## 2019-04-23 DIAGNOSIS — N30.10 INTERSTITIAL CYSTITIS: ICD-10-CM

## 2019-04-23 DIAGNOSIS — E03.9 HYPOTHYROIDISM, UNSPECIFIED TYPE: ICD-10-CM

## 2019-04-23 DIAGNOSIS — F33.41 RECURRENT MAJOR DEPRESSIVE DISORDER, IN PARTIAL REMISSION (H): ICD-10-CM

## 2019-04-23 PROCEDURE — 99396 PREV VISIT EST AGE 40-64: CPT | Performed by: OBSTETRICS & GYNECOLOGY

## 2019-04-23 PROCEDURE — 77067 SCR MAMMO BI INCL CAD: CPT | Mod: TC

## 2019-04-23 ASSESSMENT — ANXIETY QUESTIONNAIRES
6. BECOMING EASILY ANNOYED OR IRRITABLE: NOT AT ALL
5. BEING SO RESTLESS THAT IT IS HARD TO SIT STILL: NOT AT ALL
7. FEELING AFRAID AS IF SOMETHING AWFUL MIGHT HAPPEN: NOT AT ALL
3. WORRYING TOO MUCH ABOUT DIFFERENT THINGS: NOT AT ALL
GAD7 TOTAL SCORE: 0
IF YOU CHECKED OFF ANY PROBLEMS ON THIS QUESTIONNAIRE, HOW DIFFICULT HAVE THESE PROBLEMS MADE IT FOR YOU TO DO YOUR WORK, TAKE CARE OF THINGS AT HOME, OR GET ALONG WITH OTHER PEOPLE: NOT DIFFICULT AT ALL
1. FEELING NERVOUS, ANXIOUS, OR ON EDGE: NOT AT ALL
2. NOT BEING ABLE TO STOP OR CONTROL WORRYING: NOT AT ALL

## 2019-04-23 ASSESSMENT — PATIENT HEALTH QUESTIONNAIRE - PHQ9
5. POOR APPETITE OR OVEREATING: NOT AT ALL
SUM OF ALL RESPONSES TO PHQ QUESTIONS 1-9: 0

## 2019-04-23 ASSESSMENT — MIFFLIN-ST. JEOR: SCORE: 1465.44

## 2019-04-24 ASSESSMENT — ANXIETY QUESTIONNAIRES: GAD7 TOTAL SCORE: 0

## 2019-05-11 DIAGNOSIS — E03.9 HYPOTHYROIDISM, UNSPECIFIED TYPE: ICD-10-CM

## 2019-05-11 NOTE — TELEPHONE ENCOUNTER
"Note from pharmacy: please send new RX with 90 day supply, patient's insurance only covers 90 day supply    Pended #90 R-0    Last Written Prescription Date:  4/02/19  Last Fill Quantity: 30 tablet,  # refills: 0   Last office visit: 2/5/2018 with prescribing provider:  Marek   Future Office Visit:      Requested Prescriptions   Pending Prescriptions Disp Refills     levothyroxine (SYNTHROID/LEVOTHROID) 100 MCG tablet 90 tablet 0     Sig: Take 1 tablet (100 mcg) by mouth daily       Thyroid Protocol Passed - 5/11/2019 10:43 AM        Passed - Patient is 12 years or older        Passed - Recent (12 mo) or future (30 days) visit within the authorizing provider's specialty     Patient had office visit in the last 12 months or has a visit in the next 30 days with authorizing provider or within the authorizing provider's specialty.  See \"Patient Info\" tab in inbasket, or \"Choose Columns\" in Meds & Orders section of the refill encounter.              Passed - Medication is active on med list        Passed - Normal TSH on file in past 12 months     Recent Labs   Lab Test 07/19/18  1433   TSH 3.04              Passed - No active pregnancy on record     If patient is pregnant or has had a positive pregnancy test, please check TSH.          Passed - No positive pregnancy test in past 12 months     If patient is pregnant or has had a positive pregnancy test, please check TSH.            "

## 2019-05-13 RX ORDER — LEVOTHYROXINE SODIUM 100 UG/1
100 TABLET ORAL DAILY
Qty: 90 TABLET | Refills: 0 | Status: SHIPPED | OUTPATIENT
Start: 2019-05-13 | End: 2019-08-12

## 2019-05-13 NOTE — TELEPHONE ENCOUNTER
Prescription approved per [facility] Refill Protocol.  Due for physical  Marlene ARREDONDO RN

## 2019-08-09 DIAGNOSIS — E03.9 HYPOTHYROIDISM, UNSPECIFIED TYPE: ICD-10-CM

## 2019-08-09 NOTE — TELEPHONE ENCOUNTER
Reason for Call:  Medication or medication refill: levothyroxine (SYNTHROID/LEVOTHROID) 100 MCG tablet    Do you use a [facility]?  Name of the pharmacy and phone number for the current request:      [facility] #[id number] - [place] - [address]      Name of the medication requested: levothyroxine (SYNTHROID/LEVOTHROID) 100 MCG tablet    Other request: Pt called requesting refill for above medication until scheduled appointment 09/09/2019    Can we leave a detailed message on this number? YES    Phone number patient can be reached at: Home number on file [phone] (home)    Best Time: Anytime     Call taken on 8/9/2019 at 1:22 PM by Oneil Baker

## 2019-08-11 DIAGNOSIS — E03.9 HYPOTHYROIDISM, UNSPECIFIED TYPE: ICD-10-CM

## 2019-08-12 RX ORDER — LEVOTHYROXINE SODIUM 100 UG/1
100 TABLET ORAL DAILY
Qty: 30 TABLET | Refills: 0 | Status: SHIPPED | OUTPATIENT
Start: 2019-08-12 | End: 2019-09-09

## 2019-08-12 RX ORDER — LEVOTHYROXINE SODIUM 100 UG/1
TABLET ORAL
Start: 2019-08-12

## 2019-08-12 NOTE — TELEPHONE ENCOUNTER
"Denied  Duplicate; refilled 8/12/2019  Marlene ARREDONDO RN    Last Written Prescription Date:  8/12/2019  Last Fill Quantity: 30,  # refills: 0   Last office visit: 9/17/2018 with prescribing provider:     Future Office Visit:   Next 5 appointments (look out 90 days)    Sep 09, 2019 11:00 AM CDT  [facility]YSICAL with Emile Jara [facility]  [facility] [address] [facility] [id number] [phone]         Requested Prescriptions   Pending Prescriptions Disp Refills     levothyroxine (SYNTHROID/LEVOTHROID) 100 MCG tablet [Pharmacy Med Name: LEVOTHYROXINE 100 MCG TABLET] 90 tablet 0     Sig: TAKE 1 TABLET BY MOUTH EVERY DAY       Thyroid Protocol Failed - 8/11/2019 12:51 PM        Failed - Normal TSH on file in past 12 months     Recent Labs   Lab Test 07/19/18  1433   TSH 3.04              Passed - Patient is 12 years or older        Passed - Recent (12 mo) or future (30 days) visit within the authorizing provider's specialty     Patient had office visit in the last 12 months or has a visit in the next 30 days with authorizing provider or within the authorizing provider's specialty.  See \"Patient Info\" tab in inbasket, or \"Choose Columns\" in Meds & Orders section of the refill encounter.              Passed - Medication is active on med list        Passed - No active pregnancy on record     If patient is pregnant or has had a positive pregnancy test, please check TSH.          Passed - No positive pregnancy test in past 12 months     If patient is pregnant or has had a positive pregnancy test, please check TSH.            "

## 2019-08-12 NOTE — TELEPHONE ENCOUNTER
"Medication is being filled for 1 time refill only due to:  has upcoming appt   Marlene ARREDONDO RN    Requested Prescriptions   Pending Prescriptions Disp Refills     levothyroxine (SYNTHROID/LEVOTHROID) 100 MCG tablet 90 tablet 0     Sig: Take 1 tablet (100 mcg) by mouth daily       Thyroid Protocol Failed - 8/9/2019  1:25 PM        Failed - Normal TSH on file in past 12 months     Recent Labs   Lab Test 07/19/18  1433   TSH 3.04              Passed - Patient is 12 years or older        Passed - Recent (12 mo) or future (30 days) visit within the authorizing provider's specialty     Patient had office visit in the last 12 months or has a visit in the next 30 days with authorizing provider or within the authorizing provider's specialty.  See \"Patient Info\" tab in inbasket, or \"Choose Columns\" in Meds & Orders section of the refill encounter.              Passed - Medication is active on med list        Passed - No active pregnancy on record     If patient is pregnant or has had a positive pregnancy test, please check TSH.          Passed - No positive pregnancy test in past 12 months     If patient is pregnant or has had a positive pregnancy test, please check TSH.            "

## 2019-09-09 ENCOUNTER — OFFICE VISIT (OUTPATIENT)
Dept: FAMILY MEDICINE | Facility: CLINIC | Age: 54
End: 2019-09-09
Payer: COMMERCIAL

## 2019-09-09 VITALS
SYSTOLIC BLOOD PRESSURE: 103 MMHG | TEMPERATURE: 97.1 F | WEIGHT: 179 LBS | OXYGEN SATURATION: 97 % | BODY MASS INDEX: 27.13 KG/M2 | HEIGHT: 68 IN | HEART RATE: 77 BPM | DIASTOLIC BLOOD PRESSURE: 73 MMHG

## 2019-09-09 DIAGNOSIS — F33.41 RECURRENT MAJOR DEPRESSIVE DISORDER, IN PARTIAL REMISSION (H): ICD-10-CM

## 2019-09-09 DIAGNOSIS — M25.512 LEFT SHOULDER PAIN, UNSPECIFIED CHRONICITY: ICD-10-CM

## 2019-09-09 DIAGNOSIS — Z00.00 ROUTINE GENERAL MEDICAL EXAMINATION AT A HEALTH CARE FACILITY: Primary | ICD-10-CM

## 2019-09-09 DIAGNOSIS — F51.04 CHRONIC INSOMNIA: ICD-10-CM

## 2019-09-09 DIAGNOSIS — E03.9 HYPOTHYROIDISM, UNSPECIFIED TYPE: ICD-10-CM

## 2019-09-09 DIAGNOSIS — F41.9 ANXIETY: ICD-10-CM

## 2019-09-09 LAB
ERYTHROCYTE [DISTWIDTH] IN BLOOD BY AUTOMATED COUNT: 13.2 % (ref 10–15)
HCT VFR BLD AUTO: 42.4 % (ref 35–47)
HGB BLD-MCNC: 13.4 G/DL (ref 11.7–15.7)
MCH RBC QN AUTO: 30.8 PG (ref 26.5–33)
MCHC RBC AUTO-ENTMCNC: 31.6 G/DL (ref 31.5–36.5)
MCV RBC AUTO: 98 FL (ref 78–100)
PLATELET # BLD AUTO: 158 10E9/L (ref 150–450)
RBC # BLD AUTO: 4.35 10E12/L (ref 3.8–5.2)
WBC # BLD AUTO: 4.2 10E9/L (ref 4–11)

## 2019-09-09 PROCEDURE — 36415 COLL VENOUS BLD VENIPUNCTURE: CPT | Performed by: INTERNAL MEDICINE

## 2019-09-09 PROCEDURE — 84443 ASSAY THYROID STIM HORMONE: CPT | Performed by: INTERNAL MEDICINE

## 2019-09-09 PROCEDURE — 99396 PREV VISIT EST AGE 40-64: CPT | Performed by: INTERNAL MEDICINE

## 2019-09-09 PROCEDURE — 85027 COMPLETE CBC AUTOMATED: CPT | Performed by: INTERNAL MEDICINE

## 2019-09-09 PROCEDURE — 80053 COMPREHEN METABOLIC PANEL: CPT | Performed by: INTERNAL MEDICINE

## 2019-09-09 PROCEDURE — 80061 LIPID PANEL: CPT | Performed by: INTERNAL MEDICINE

## 2019-09-09 RX ORDER — LEVOTHYROXINE SODIUM 100 UG/1
100 TABLET ORAL DAILY
Qty: 90 TABLET | Refills: 3 | Status: SHIPPED | OUTPATIENT
Start: 2019-09-09 | End: 2019-09-30

## 2019-09-09 RX ORDER — CLONAZEPAM 1 MG/1
1 TABLET ORAL AT BEDTIME
Qty: 90 TABLET | Refills: 0 | Status: SHIPPED | OUTPATIENT
Start: 2019-09-09 | End: 2020-03-17

## 2019-09-09 RX ORDER — ZOLPIDEM TARTRATE 5 MG/1
TABLET ORAL
Qty: 90 TABLET | Refills: 0 | Status: SHIPPED | OUTPATIENT
Start: 2019-09-09 | End: 2020-03-17

## 2019-09-09 ASSESSMENT — MIFFLIN-ST. JEOR: SCORE: 1455.68

## 2019-09-09 NOTE — LETTER
[place] [address] [facility]  [address]  [place]  [address]  Tel: [phone]    September 10, 2019    Vero LUIS Stoney  [address]   [place] [address]        Dear MsEliz Cohenor,    It was a pleasure seeing you for your physical examination.  I wanted to get back to you with your test results.  I have enclosed a copy for your review.     I am happy to report that your cbc or complete blood count is normal with no signs of anemia, leukemia or platelet abnormalities. Your chemistry panel shows no signs of diabetes.  Your blood salts, kidney tests, liver tests, thyroid test, urine test, and proteins are all fine.    Your total cholesterol is 179 with the normal range being below 200.  Your HDL or good cholesterol is 72 with the normal range being above 50.  Your LDL or bad cholesterol is 84 with the normal range being below 130.  These numbers are super.    I am happy to bring you this excellent report.  If you have any questions let me know.    If you have any further questions or problems, please contact our office.      Sincerely,    Emile Jara MD/LUIZA          Enclosure: Lab Results  Results for orders placed or performed in visit on 09/09/19   CBC with platelets   Result Value Ref Range    WBC 4.2 4.0 - 11.0 10e9/L    RBC Count 4.35 3.8 - 5.2 10e12/L    Hemoglobin 13.4 11.7 - 15.7 g/dL    Hematocrit 42.4 35.0 - 47.0 %    MCV 98 78 - 100 fl    MCH 30.8 26.5 - 33.0 pg    MCHC 31.6 31.5 - 36.5 g/dL    RDW 13.2 10.0 - 15.0 %    Platelet Count 158 150 - 450 10e9/L   Comprehensive metabolic panel   Result Value Ref Range    Sodium 141 133 - 144 mmol/L    Potassium 4.0 3.4 - 5.3 mmol/L    Chloride 108 94 - 109 mmol/L    Carbon Dioxide 27 20 - 32 mmol/L    Anion Gap 6 3 - 14 mmol/L    Glucose 85 70 - 99 mg/dL    Urea Nitrogen 16 7 - 30 mg/dL    Creatinine 0.90 0.52 - 1.04 mg/dL    GFR Estimate 72 >60 mL/min/[1.73_m2]    GFR Estimate If Black 84 >60 mL/min/[1.73_m2]    Calcium 8.6 8.5 - 10.1  mg/dL    Bilirubin Total 0.7 0.2 - 1.3 mg/dL    Albumin 4.1 3.4 - 5.0 g/dL    Protein Total 6.9 6.8 - 8.8 g/dL    Alkaline Phosphatase 81 40 - 150 U/L    ALT 30 0 - 50 U/L    AST 24 0 - 45 U/L   Lipid panel reflex to direct LDL Non-fasting   Result Value Ref Range    Cholesterol 179 <200 mg/dL    Triglycerides 117 <150 mg/dL    HDL Cholesterol 72 >49 mg/dL    LDL Cholesterol Calculated 84 <100 mg/dL    Non HDL Cholesterol 107 <130 mg/dL   TSH with free T4 reflex   Result Value Ref Range    TSH 2.63 0.40 - 4.00 mU/L

## 2019-09-09 NOTE — PROGRESS NOTES
SUBJECTIVE:   CC: Vero Lua is an 54 year old woman who presents for preventive health visit.     Overall the patient is doing well.  She has had left shoulder pain for the last 8 months or so.  There was no trauma.  The right side spine.  It hurts to use it.  No radiating pain.    Her depression is under control.  She has chronic insomnia and has used Klonopin and Ambien at night for years with no signs of abuse.  She does see psychiatry but I would refilling those.  Her migraines have been well controlled.  She works out some.  She is up-to-date with her gynecologist.    Healthy Habits:     Getting at least 3 servings of Calcium per day:  NO (1)    Bi-annual eye exam:  Yes    Dental care twice a year:  Yes    Sleep apnea or symptoms of sleep apnea:  None    Diet:  Regular (no restrictions)    Frequency of exercise:: daily     Duration of exercise:: 10 mins twice daily walking.    Taking medications regularly:  Yes    Barriers to taking medications:  None    Medication side effects:  None    PHQ-2 Total Score: 0    Additional concerns today:  No              Today's PHQ-2 Score:   PHQ-2 ( 1999 Pf[employer]) 9/9/2019   Q1: Little interest or pleasure in doing things 0   Q2: Feeling down, depressed or hopeless 0   PHQ-2 Score 0       Abuse: Current or Past(Physical, Sexual or Emotional)- NO  Do you feel safe in your environment? YES    Social History     Tobacco Use     Smoking status: Never Smoker     Smokeless tobacco: Never Used   Substance Use Topics     Alcohol use: No     Alcohol/week: 0.0 oz     If you drink alcohol do you typically have >3 drinks per day or >7 drinks per week? No               Past Medical History:      Past Medical History:   Diagnosis Date     Adenomatous polyp of colon 5/16    fu 5 years[place]     Anxiety      Chronic insomnia     on ambien and klonopin for over 10 years together     Depression, major, in partial remission (H)     was seeing psyche until 2015, not since then      Fibromyalgia 1998     Hypothyroid 90's     Interstitial cystitis     urol [place] and had interstem     Migraines     Dr. Cash of neuro, got botox 2012 via [facility] Dr. Kevin, now Dr. Watkins     Restless leg syndrome      Urgency of urination     interstim             Past Surgical History:      Past Surgical History:   Procedure Laterality Date     interstim procedure  2015, 2010 and 2008    [facility]             Social History:     Social History     Socioeconomic History     Marital status:      Spouse name: Not on file     Number of children: 1     Years of education: Not on file     Highest education level: Not on file   Occupational History     Occupation: homemaker     Employer: NONE    Social Needs     Financial resource strain: Not on file     Food insecurity:     Worry: Not on file     Inability: Not on file     Transportation needs:     Medical: Not on file     Non-medical: Not on file   Tobacco Use     Smoking status: Never Smoker     Smokeless tobacco: Never Used   Substance and Sexual Activity     Alcohol use: No     Alcohol/week: 0.0 oz     Drug use: No     Sexual activity: Yes     Partners: Male     Birth control/protection: Post-menopausal   Lifestyle     Physical activity:     Days per week: Not on file     Minutes per session: Not on file     Stress: Not on file   Relationships     Social connections:     Talks on phone: Not on file     Gets together: Not on file     Attends Rastafari service: Not on file     Active member of club or organization: Not on file     Attends meetings of clubs or organizations: Not on file     Relationship status: Not on file     Intimate partner violence:     Fear of current or ex partner: Not on file     Emotionally abused: Not on file     Physically abused: Not on file     Forced sexual activity: Not on file   Other Topics Concern     Parent/sibling w/ CABG, MI or angioplasty before 65F 55M? Not Asked   Social History Narrative     Not on file              "Family History:   reviewed         Allergies:     Allergies   Allergen Reactions     Adhesive Tape Rash             Medications:     Current Outpatient Medications   Medication Sig Dispense Refill     clonazePAM (KLONOPIN) 1 MG tablet Take 1 tablet (1 mg) by mouth At Bedtime 90 tablet 0     levothyroxine (SYNTHROID/LEVOTHROID) 100 MCG tablet Take 1 tablet (100 mcg) by mouth daily 90 tablet 3     loratadine (CLARITIN) 10 MG tablet Take 1 tablet (10 mg) by mouth daily 30 tablet 1     topiramate (TOPAMAX) 200 MG tablet Take 200 mg by mouth daily        vortioxetine (BRINTELLIX) 10 MG tablet Take 1 tablet (10 mg) by mouth daily 90 tablet 3     zolpidem (AMBIEN) 5 MG tablet TAKE 1 TABLET BY MOUTH AT BEDTIME AS NEEDED 90 tablet 0               Review of Systems:   The 10 point Review of Systems is negative other than noted in the HPI           Physical Exam:   Blood pressure 103/73, pulse 77, temperature 97.1  F (36.2  C), temperature source Oral, height 1.72 m (5' 7.7\"), weight 81.2 kg (179 lb), last menstrual period 06/27/2012, SpO2 97 %, not currently breastfeeding.    Exam:  Constitutional: healthy appearing, alert and in no distress  Heent: Normocephalic. Head without obvious masses or lesions. PERRLDC, EOMI. Mouth exam within normal limits: tongue, mucous membranes, posterior pharynx all normal, no lesions or abnormalities seen.  Tm's and canals within normal limits bilaterally. Neck supple, no nuchal rigidity or masses. No supraclavicular, or cervical adenopathy. Thyroid symmetric, no masses.  Cardiovascular: Regular rate and rhythm, no murmer, rub or gallops.  JVP not elevated, no edema.  Carotids within normal limits bilaterally, no bruits.  Respiratory: Normal respiratory effort.  Lungs clear, normal flow, no wheezing or crackles.  Gastrointestinal: Normal active bowel sounds.   Soft, not tender, no masses, guarding or rebound.  No hepatosplenomegaly.   Musculoskeletal: extremities normal, no gross deformities " noted.  Some pain with range of motion of shoulder, not red, warm or tender  Skin: no suspicious lesions or rashes   Neurologic: Mental status within normal limits.  Speech fluent.  No gross motor abnormalities and gait intact.  Psychiatric: mentation appears normal and affect normal.         Data:   Labs sent        Assessment:   1. Normal complete physical exam  2. Shoulder pain, ?frozen shoulder, to jocelyne and if not gone soon to ortho  3. Depression and anxiety, controlled  4. Colon polyp, up to date on follow up  5. Migraines, stable  6. Chronic insomnia, I discussed the risks of Ambien long-term including falls and confusion and she understands this.  I suggested she try cutting down if she can.         Plan:   Exercise, diet and weight loss  jocelyne  Call if changes      Emile Jara M.D.

## 2019-09-10 LAB
ALBUMIN SERPL-MCNC: 4.1 G/DL (ref 3.4–5)
ALP SERPL-CCNC: 81 U/L (ref 40–150)
ALT SERPL W P-5'-P-CCNC: 30 U/L (ref 0–50)
ANION GAP SERPL CALCULATED.3IONS-SCNC: 6 MMOL/L (ref 3–14)
AST SERPL W P-5'-P-CCNC: 24 U/L (ref 0–45)
BILIRUB SERPL-MCNC: 0.7 MG/DL (ref 0.2–1.3)
BUN SERPL-MCNC: 16 MG/DL (ref 7–30)
CALCIUM SERPL-MCNC: 8.6 MG/DL (ref 8.5–10.1)
CHLORIDE SERPL-SCNC: 108 MMOL/L (ref 94–109)
CHOLEST SERPL-MCNC: 179 MG/DL
CO2 SERPL-SCNC: 27 MMOL/L (ref 20–32)
CREAT SERPL-MCNC: 0.9 MG/DL (ref 0.52–1.04)
GFR SERPL CREATININE-BSD FRML MDRD: 72 ML/MIN/{1.73_M2}
GLUCOSE SERPL-MCNC: 85 MG/DL (ref 70–99)
HDLC SERPL-MCNC: 72 MG/DL
LDLC SERPL CALC-MCNC: 84 MG/DL
NONHDLC SERPL-MCNC: 107 MG/DL
POTASSIUM SERPL-SCNC: 4 MMOL/L (ref 3.4–5.3)
PROT SERPL-MCNC: 6.9 G/DL (ref 6.8–8.8)
SODIUM SERPL-SCNC: 141 MMOL/L (ref 133–144)
TRIGL SERPL-MCNC: 117 MG/DL
TSH SERPL DL<=0.005 MIU/L-ACNC: 2.63 MU/L (ref 0.4–4)

## 2019-09-10 NOTE — RESULT ENCOUNTER NOTE
It was a pleasure seeing you for your physical examination.  I wanted to get back to you with your test results.  I have enclosed a copy for your review.     I am happy to report that your cbc or complete blood count is normal with no signs of anemia, leukemia or platelet abnormalities. Your chemistry panel shows no signs of diabetes.  Your blood salts, kidney tests, liver tests, thyroid test, urine test, and proteins are all fine.    Your total cholesterol is 179 with the normal range being below 200.  Your HDL or good cholesterol is 72 with the normal range being above 50.  Your LDL or bad cholesterol is 84 with the normal range being below 130.  These numbers are super.    I am happy to bring you this excellent report.  If you have any questions let me know.

## 2019-09-11 ENCOUNTER — THERAPY VISIT (OUTPATIENT)
Dept: PHYSICAL THERAPY | Facility: CLINIC | Age: 54
End: 2019-09-11
Attending: INTERNAL MEDICINE
Payer: COMMERCIAL

## 2019-09-11 DIAGNOSIS — M75.02 ADHESIVE CAPSULITIS OF LEFT SHOULDER: ICD-10-CM

## 2019-09-11 DIAGNOSIS — M25.512 LEFT SHOULDER PAIN, UNSPECIFIED CHRONICITY: ICD-10-CM

## 2019-09-11 PROCEDURE — 97140 MANUAL THERAPY 1/> REGIONS: CPT | Mod: GP | Performed by: PHYSICAL THERAPIST

## 2019-09-11 PROCEDURE — 97110 THERAPEUTIC EXERCISES: CPT | Mod: GP | Performed by: PHYSICAL THERAPIST

## 2019-09-11 PROCEDURE — 97161 PT EVAL LOW COMPLEX 20 MIN: CPT | Mod: GP | Performed by: PHYSICAL THERAPIST

## 2019-09-11 NOTE — PROGRESS NOTES
[facility] Initial Evaluation  Subjective:  Patient is referred with a 6 month hx of L shoulder pain and limited ROM. She recalls just waking one morning with shoulder pain. She notes difficulty reaching overhead, out to the side and behind the back. Lying on L side is painful.    The history is provided by the patient.   Type of problem:  Left shoulder   Condition occurred with:  Unknown cause. This is a chronic condition    Patient reports pain:  In the joint, lateral and upper arm. Radiates to:  Upper arm. Associated symptoms:  Loss of motion/stiffness. Symptoms are exacerbated by lifting, using arm at shoulder level, using arm overhead, using arm behind back, lying on extremity and certain positions     Vero Lua being seen for left shoulder pain and stiffness.   Where condition occurred: for unknown reasons.  and reported as 6/10 on pain scale. General health as reported by patient is good. Pertinent medical history includes:  Menopausal, fibromyalgia, migraines/headaches and thyroid problems.  Medical allergies: adhesives.    Current medications:  Thyroid medication.   Primary job tasks include:  Computer work and prolonged sitting.  Pain is described as aching and is intermittent. Pain is worse during the day. Since onset symptoms are unchanged.      Patient is student. Restrictions include:  Working in normal job without restrictions.    Barriers include:  None as reported by patient.  Red flags:  None as reported by patient.                      Objective:  System                   Shoulder Evaluation:  ROM:  AROM:    Flexion:  Left:  150    Right:  170    Abduction:  Left: 130   Right:  165                      PROM:            Internal Rotation:  Left:  40    Right:  65  External Rotation:  Left:  70    Right:  95                  Endfeel: firm  Strength:  normal                      Stability Testing:  normal      Special Tests:  not assessed      Palpation:   normal      Mobility Tests:    Glenohumeral anterior left:  Hypomobile  Glenohumeral anterior right:  Normal  Glenohumeral posterior left:  Hypomobile  Glenohumeral posterior right:  Normal  Glenohumeral inferior left:  Hypomobile  Glenohumeral inferior right:  Normal                                             General     ROS    Assessment/Plan:    Patient is a 54 year old female with left side shoulder complaints.    Patient has the following significant findings with corresponding treatment plan.                Diagnosis 1:  Left shoulder adhesive capsulitis  Pain -  hot/cold therapy, manual therapy, self management, education and home program  Decreased ROM/flexibility - manual therapy, therapeutic exercise and home program  Decreased joint mobility - manual therapy, therapeutic exercise and home program  Decreased function - therapeutic activities and home program    Therapy Evaluation Codes:   1) History comprised of:   Personal factors that impact the plan of care:      Time since onset of symptoms.    Comorbidity factors that impact the plan of care are:      Fibromyalgia and Menopausal.     Medications impacting care: None.  2) Examination of Body Systems comprised of:   Body structures and functions that impact the plan of care:      Shoulder.   Activity limitations that impact the plan of care are:      Dressing, Lifting and Sleeping.  3) Clinical presentation characteristics are:   Stable/Uncomplicated.  4) Decision-Making    Low complexity using standardized patient assessment instrument and/or measureable assessment of functional outcome.  Cumulative Therapy Evaluation is: Low complexity.    Previous and current functional limitations:  (See Goal Flow Sheet for this information)    Short term and Long term goals: (See Goal Flow Sheet for this information)     Communication ability:  Patient appears to be able to clearly communicate and understand verbal and written communication and follow directions  correctly.  Treatment Explanation - The following has been discussed with the patient:   RX ordered/plan of care  Anticipated outcomes  Possible risks and side effects  This patient would benefit from PT intervention to resume normal activities.   Rehab potential is good.    Frequency:  1-2 X week, once daily  Duration:  for 6 weeks  Discharge Plan:  Achieve all LTG.  Independent in home treatment program.  Reach maximal therapeutic benefit.    Please refer to the daily flowsheet for treatment today, total treatment time and time spent performing 1:1 timed codes.

## 2019-09-20 ENCOUNTER — THERAPY VISIT (OUTPATIENT)
Dept: PHYSICAL THERAPY | Facility: CLINIC | Age: 54
End: 2019-09-20
Payer: COMMERCIAL

## 2019-09-20 DIAGNOSIS — M75.02 ADHESIVE CAPSULITIS OF LEFT SHOULDER: ICD-10-CM

## 2019-09-20 PROCEDURE — 97110 THERAPEUTIC EXERCISES: CPT | Mod: GP | Performed by: PHYSICAL THERAPIST

## 2019-09-20 PROCEDURE — 97140 MANUAL THERAPY 1/> REGIONS: CPT | Mod: GP | Performed by: PHYSICAL THERAPIST

## 2019-09-24 DIAGNOSIS — E03.9 HYPOTHYROIDISM, UNSPECIFIED TYPE: ICD-10-CM

## 2019-09-25 RX ORDER — LEVOTHYROXINE SODIUM 100 UG/1
TABLET ORAL
Start: 2019-09-25

## 2019-09-25 NOTE — TELEPHONE ENCOUNTER
"levothyroxine (SYNTHROID/LEVOTHROID) 100 MCG tablet 90 tablet 3 9/9/2019  No   Sig - Route: Take 1 tablet (100 mcg) by mouth daily      Last Written Prescription Date:  09/09/2019  Last Fill Quantity: 90,  # refills: 3   Last office visit: 9/9/2019 with prescribing provider:     Future Office Visit:      Requested Prescriptions   Pending Prescriptions Disp Refills     levothyroxine (SYNTHROID/LEVOTHROID) 100 MCG tablet [Pharmacy Med Name: LEVOTHYROXINE 100 MCG TABLET] 30 tablet 0     Sig: TAKE 1 TABLET BY MOUTH EVERY DAY*NO MORE REFILLS UNTIL NEXT APPT*       Thyroid Protocol Passed - 9/24/2019 11:34 AM        Passed - Patient is 12 years or older        Passed - Recent (12 mo) or future (30 days) visit within the authorizing provider's specialty     Patient had office visit in the last 12 months or has a visit in the next 30 days with authorizing provider or within the authorizing provider's specialty.  See \"Patient Info\" tab in inbasket, or \"Choose Columns\" in Meds & Orders section of the refill encounter.              Passed - Medication is active on med list        Passed - Normal TSH on file in past 12 months     Recent Labs   Lab Test 09/09/19  1139   TSH 2.63              Passed - No active pregnancy on record     If patient is pregnant or has had a positive pregnancy test, please check TSH.          Passed - No positive pregnancy test in past 12 months     If patient is pregnant or has had a positive pregnancy test, please check TSH.              "

## 2019-09-26 ENCOUNTER — THERAPY VISIT (OUTPATIENT)
Dept: PHYSICAL THERAPY | Facility: CLINIC | Age: 54
End: 2019-09-26
Payer: COMMERCIAL

## 2019-09-26 DIAGNOSIS — M75.02 ADHESIVE CAPSULITIS OF LEFT SHOULDER: ICD-10-CM

## 2019-09-26 PROCEDURE — 97110 THERAPEUTIC EXERCISES: CPT | Mod: GP | Performed by: PHYSICAL THERAPIST

## 2019-09-26 PROCEDURE — 97140 MANUAL THERAPY 1/> REGIONS: CPT | Mod: GP | Performed by: PHYSICAL THERAPIST

## 2019-09-30 ENCOUNTER — TELEPHONE (OUTPATIENT)
Dept: FAMILY MEDICINE | Facility: CLINIC | Age: 54
End: 2019-09-30

## 2019-09-30 DIAGNOSIS — E03.9 HYPOTHYROIDISM, UNSPECIFIED TYPE: ICD-10-CM

## 2019-09-30 RX ORDER — LEVOTHYROXINE SODIUM 100 UG/1
100 TABLET ORAL DAILY
Qty: 90 TABLET | Refills: 3 | Status: SHIPPED | OUTPATIENT
Start: 2019-09-30 | End: 2020-10-06

## 2019-10-04 ENCOUNTER — THERAPY VISIT (OUTPATIENT)
Dept: PHYSICAL THERAPY | Facility: CLINIC | Age: 54
End: 2019-10-04
Payer: COMMERCIAL

## 2019-10-04 DIAGNOSIS — M75.02 ADHESIVE CAPSULITIS OF LEFT SHOULDER: ICD-10-CM

## 2019-10-04 PROCEDURE — 97140 MANUAL THERAPY 1/> REGIONS: CPT | Mod: GP | Performed by: PHYSICAL THERAPIST

## 2019-10-04 PROCEDURE — 97110 THERAPEUTIC EXERCISES: CPT | Mod: GP | Performed by: PHYSICAL THERAPIST

## 2019-10-04 NOTE — PROGRESS NOTES
[facility] Initial Evaluation  Subjective:  HPI                    Objective:  System    Physical Exam    General     ROS    Assessment/Plan:    {REHAB NOTES:116517}

## 2019-11-08 ENCOUNTER — THERAPY VISIT (OUTPATIENT)
Dept: PHYSICAL THERAPY | Facility: CLINIC | Age: 54
End: 2019-11-08
Payer: COMMERCIAL

## 2019-11-08 DIAGNOSIS — M75.02 ADHESIVE CAPSULITIS OF LEFT SHOULDER: ICD-10-CM

## 2019-11-08 PROCEDURE — 97140 MANUAL THERAPY 1/> REGIONS: CPT | Mod: GP | Performed by: PHYSICAL THERAPIST

## 2019-11-08 PROCEDURE — 97110 THERAPEUTIC EXERCISES: CPT | Mod: GP | Performed by: PHYSICAL THERAPIST

## 2019-11-15 ENCOUNTER — THERAPY VISIT (OUTPATIENT)
Dept: PHYSICAL THERAPY | Facility: CLINIC | Age: 54
End: 2019-11-15
Payer: COMMERCIAL

## 2019-11-15 DIAGNOSIS — M75.02 ADHESIVE CAPSULITIS OF LEFT SHOULDER: ICD-10-CM

## 2019-11-15 PROCEDURE — 97140 MANUAL THERAPY 1/> REGIONS: CPT | Mod: GP | Performed by: PHYSICAL THERAPIST

## 2019-11-15 PROCEDURE — 97110 THERAPEUTIC EXERCISES: CPT | Mod: GP | Performed by: PHYSICAL THERAPIST

## 2019-12-05 ENCOUNTER — THERAPY VISIT (OUTPATIENT)
Dept: PHYSICAL THERAPY | Facility: CLINIC | Age: 54
End: 2019-12-05
Payer: COMMERCIAL

## 2019-12-05 DIAGNOSIS — M77.12 LEFT LATERAL EPICONDYLITIS: ICD-10-CM

## 2019-12-05 DIAGNOSIS — M75.02 ADHESIVE CAPSULITIS OF LEFT SHOULDER: ICD-10-CM

## 2019-12-05 PROCEDURE — 97110 THERAPEUTIC EXERCISES: CPT | Mod: GP | Performed by: PHYSICAL THERAPIST

## 2019-12-05 PROCEDURE — 97032 APPL MODALITY 1+ESTIM EA 15: CPT | Mod: GP | Performed by: PHYSICAL THERAPIST

## 2019-12-05 PROCEDURE — 97140 MANUAL THERAPY 1/> REGIONS: CPT | Mod: GP | Performed by: PHYSICAL THERAPIST

## 2019-12-16 ENCOUNTER — THERAPY VISIT (OUTPATIENT)
Dept: PHYSICAL THERAPY | Facility: CLINIC | Age: 54
End: 2019-12-16
Payer: COMMERCIAL

## 2019-12-16 DIAGNOSIS — M75.02 ADHESIVE CAPSULITIS OF LEFT SHOULDER: ICD-10-CM

## 2019-12-16 DIAGNOSIS — M77.12 LEFT LATERAL EPICONDYLITIS: ICD-10-CM

## 2019-12-16 PROCEDURE — 97110 THERAPEUTIC EXERCISES: CPT | Mod: GP | Performed by: PHYSICAL THERAPIST

## 2019-12-16 PROCEDURE — 97140 MANUAL THERAPY 1/> REGIONS: CPT | Mod: GP | Performed by: PHYSICAL THERAPIST

## 2020-01-16 PROBLEM — M77.12 LEFT LATERAL EPICONDYLITIS: Status: RESOLVED | Noted: 2019-12-05 | Resolved: 2020-01-16

## 2020-01-16 PROBLEM — M75.02 ADHESIVE CAPSULITIS OF LEFT SHOULDER: Status: RESOLVED | Noted: 2019-09-11 | Resolved: 2020-01-16

## 2020-01-16 NOTE — PROGRESS NOTES
Discharge Note    Progress reporting period is from last progress note on   to Dec 16, 2019.    Vero failed to follow up and current status is unknown.  Please see information below for last relevant information on current status.  Patient seen for 8 visits.    SUBJECTIVE  Subjective changes noted by patient:  Doing well. Decreased elbow pain.  .  Current pain level is  .     Previous pain level was  6/10.   Changes in function:  Yes (See Goal flowsheet attached for changes in current functional level)  Adverse reaction to treatment or activity: None    OBJECTIVE  Changes noted in objective findings: Flexion WNL, abduction WNL, ER 90, IR 65     ASSESSMENT/PLAN  Diagnosis: L frozen shoulder   Updated problem list and treatment plan:   Pain - HEP  STG/LTGs have been met or progress has been made towards goals:  Yes, please see goal flowsheet for most current information  Assessment of Progress: current status is unknown.    Last current status: Pt is progressing well   Self Management Plans:  HEP  I have re-evaluated this patient and find that the nature, scope, duration and intensity of the therapy is appropriate for the medical condition of the patient.  Vero continues to require the following intervention to meet STG and LTG's:  HEP.    Recommendations:  Discharge with current home program.  Patient to follow up with MD as needed.    Please refer to the daily flowsheet for treatment today, total treatment time and time spent performing 1:1 timed codes.

## 2020-03-16 DIAGNOSIS — F41.9 ANXIETY: ICD-10-CM

## 2020-03-16 DIAGNOSIS — F51.04 CHRONIC INSOMNIA: ICD-10-CM

## 2020-03-16 NOTE — TELEPHONE ENCOUNTER
Reason for Call:  Medication or medication refill:    Do you use a [facility]?  Name of the pharmacy and phone number for the current request:     [id number] - [place], [place] - [address]    Name of the medication requested: clonazePAM (KLONOPIN) 1 MG tablet   &  zolpidem (AMBIEN) 5 MG tablet     Other request:     Can we leave a detailed message on this number? YES    Phone number patient can be reached at: Cell number on file:    Telephone Information:   Mobile [phone]     Best Time: any    Call taken on 3/16/2020 at 12:25 PM by Stefania Polanco

## 2020-03-16 NOTE — TELEPHONE ENCOUNTER
Clonazepam 1 mg    Last Written Prescription Date:  09/09/2019  Last Fill Quantity: 90 tablets,  # refills: 0   Last office visit: 9/9/2019 with prescribing provider:  Marek Lo Office Visit:      Zolpidem 5 mg    Last Written Prescription Date:  09/09/2019  Last Fill Quantity: 90 tablets,  # refills: 0   Last office visit: 9/9/2019 with prescribing provider:  Marek   Future Office Visit:      Routing refill request to provider for review/approval because:  Drug not on the [facility], [facility] or [facility] refill protocol or controlled substance

## 2020-03-17 RX ORDER — CLONAZEPAM 1 MG/1
1 TABLET ORAL AT BEDTIME
Qty: 90 TABLET | Refills: 0 | Status: SHIPPED | OUTPATIENT
Start: 2020-03-17 | End: 2022-07-08

## 2020-03-17 RX ORDER — ZOLPIDEM TARTRATE 5 MG/1
TABLET ORAL
Qty: 90 TABLET | Refills: 0 | Status: SHIPPED | OUTPATIENT
Start: 2020-03-17 | End: 2022-07-13

## 2020-05-21 ENCOUNTER — VIRTUAL VISIT (OUTPATIENT)
Dept: FAMILY MEDICINE | Facility: CLINIC | Age: 55
End: 2020-05-21
Payer: COMMERCIAL

## 2020-05-21 DIAGNOSIS — R10.11 ABDOMINAL PAIN, RIGHT UPPER QUADRANT: Primary | ICD-10-CM

## 2020-05-21 PROCEDURE — 99214 OFFICE O/P EST MOD 30 MIN: CPT | Mod: 95 | Performed by: INTERNAL MEDICINE

## 2020-05-21 NOTE — PROGRESS NOTES
"Vero Lua is a 54 year old female who is being evaluated via a billable video visit.      The patient has been notified of following:     \"This video visit will be conducted via a call between you and your physician/provider. We have found that certain health care needs can be provided without the need for an in-person physical exam.  This service lets us provide the care you need with a video conversation.  If a prescription is necessary we can send it directly to your pharmacy.  If lab work is needed we can place an order for that and you can then stop by our lab to have the test done at a later time.    Video visits are billed at different rates depending on your insurance coverage.  Please reach out to your insurance provider with any questions.    If during the course of the call the physician/provider feels a video visit is not appropriate, you will not be charged for this service.\"    Patient has given verbal consent for Video visit? Yes    How would you like to obtain your AVS? Manuela    Patient would like the video invitation sent by: Text to cell phone: [phone]    Will anyone else be joining your video visit? No      Subjective     Vero Lua is a 54 year old female who presents today via video visit for the following health issues:    Pain on right side of abdomen off and on for at least a month, middle of right side.  She has strong family history of liver and pancreatic cancer in mother and uncle and aunt.  Sometimes worse after eat, nothing else affects it.  May be getting worse, more often.  Sometimes can feel it into back as well.  No n/v or weight loss.  Bowel movement, some constip at times, not b/b.  She also notes fatigue, not new, also chills after she eats.  No h/o gallstones or liver dz.  Last colonoscopy 2016.  Appetite has been fine.      HPI        Objective    LMP 06/27/2012   Estimated body mass index is 27.46 kg/m  as calculated from the following:    Height as of 9/9/19: " "1.72 m (5' 7.7\").    Weight as of 9/9/19: 81.2 kg (179 lb).  Physical Exam     GENERAL: Healthy, alert and no distress  EYES: Eyes grossly normal to inspection.  No discharge or erythema, or obvious scleral/conjunctival abnormalities.  RESP: No audible wheeze, cough, or visible cyanosis.  No visible retractions or increased work of breathing.    SKIN: Visible skin clear. No significant rash, abnormal pigmentation or lesions.  NEURO: Cranial nerves grossly intact.  Mentation and speech appropriate for age.  PSYCH: Mentation appears normal, affect normal/bright, judgement and insight intact, normal speech and appearance well-groomed.    Past Medical History:   Diagnosis Date     Adenomatous polyp of colon 5/16    fu 5 years, [place] gi     Anxiety      Chronic insomnia     on ambien and klonopin for over 10 years together     Depression, major, in partial remission (H)     was seeing psyche until 2015, not since then     Fibromyalgia 1998     Hypothyroid 90's     Interstitial cystitis     urol [place] and had interstem     Migraines     Dr. Cash of neuro, got botox 2012 via [facility] Dr. Kevin, now Dr. Watkins     Restless leg syndrome      Urgency of urination     interstim     Past Surgical History:   Procedure Laterality Date     interstim procedure  2015, 2010 and 2008    [facility]     Social History     Socioeconomic History     Marital status:      Spouse name: Not on file     Number of children: 1     Years of education: Not on file     Highest education level: Not on file   Occupational History     Occupation: homemaker     Employer: NONE    Social Needs     Financial resource strain: Not on file     Food insecurity     Worry: Not on file     Inability: Not on file     Transportation needs     Medical: Not on file     Non-medical: Not on file   Tobacco Use     Smoking status: Never Smoker     Smokeless tobacco: Never Used   Substance and Sexual Activity     Alcohol use: No     Alcohol/week: 0.0 standard " drinks     Drug use: No     Sexual activity: Yes     Partners: Male     Birth control/protection: Post-menopausal   Lifestyle     Physical activity     Days per week: Not on file     Minutes per session: Not on file     Stress: Not on file   Relationships     Social connections     Talks on phone: Not on file     Gets together: Not on file     Attends [employer] service: Not on file     Active member of club or organization: Not on file     Attends meetings of clubs or organizations: Not on file     Relationship status: Not on file     Intimate partner violence     Fear of current or ex partner: Not on file     Emotionally abused: Not on file     Physically abused: Not on file     Forced sexual activity: Not on file   Other Topics Concern     Parent/sibling w/ CABG, MI or angioplasty before 65F 55M? Not Asked   Social History Narrative     Not on file     Current Outpatient Medications   Medication Sig Dispense Refill     clonazePAM (KLONOPIN) 1 MG tablet Take 1 tablet (1 mg) by mouth At Bedtime 90 tablet 0     levothyroxine (SYNTHROID/LEVOTHROID) 100 MCG tablet Take 1 tablet (100 mcg) by mouth daily 90 tablet 3     loratadine (CLARITIN) 10 MG tablet Take 1 tablet (10 mg) by mouth daily 30 tablet 1     topiramate (TOPAMAX) 200 MG tablet Take 200 mg by mouth daily        vortioxetine (BRINTELLIX) 10 MG tablet Take 1 tablet (10 mg) by mouth daily 90 tablet 3     zolpidem (AMBIEN) 5 MG tablet TAKE 1 TABLET BY MOUTH AT BEDTIME AS NEEDED 90 tablet 0     Allergies   Allergen Reactions     Adhesive Tape Rash     FAMILY HISTORY NOTED AND REVIEWED    REVIEW OF SYSTEMS: above    PHYSICAL EXAM    LMP 06/27/2012     Patient appears non toxic  ASSESSMENT:  Abdomen pain of uncertain cause, will get ct to evaluated and labs  Fatigue, ?signficant, to get labs    PLAN:  Labs  Ct    Emile Jara M.D.                    Video-Visit Details    Type of service:  Video Visit  Video start time:11:04  Video End Time:11:14 AM    Originating  Location (pt. Location): Home    Distant Location (provider location):  [facility] [place]     Platform used for Video Visit: [facility]    No follow-ups on file.       Emile Jara MD

## 2020-05-22 DIAGNOSIS — R10.11 ABDOMINAL PAIN, RIGHT UPPER QUADRANT: ICD-10-CM

## 2020-05-22 LAB
ERYTHROCYTE [DISTWIDTH] IN BLOOD BY AUTOMATED COUNT: 13.4 % (ref 10–15)
HCT VFR BLD AUTO: 43.1 % (ref 35–47)
HGB BLD-MCNC: 13.6 G/DL (ref 11.7–15.7)
MCH RBC QN AUTO: 30.8 PG (ref 26.5–33)
MCHC RBC AUTO-ENTMCNC: 31.6 G/DL (ref 31.5–36.5)
MCV RBC AUTO: 98 FL (ref 78–100)
PLATELET # BLD AUTO: 168 10E9/L (ref 150–450)
RBC # BLD AUTO: 4.41 10E12/L (ref 3.8–5.2)
WBC # BLD AUTO: 4.3 10E9/L (ref 4–11)

## 2020-05-22 PROCEDURE — 80076 HEPATIC FUNCTION PANEL: CPT | Performed by: INTERNAL MEDICINE

## 2020-05-22 PROCEDURE — 85027 COMPLETE CBC AUTOMATED: CPT | Performed by: INTERNAL MEDICINE

## 2020-05-22 PROCEDURE — 84443 ASSAY THYROID STIM HORMONE: CPT | Performed by: INTERNAL MEDICINE

## 2020-05-22 PROCEDURE — 36415 COLL VENOUS BLD VENIPUNCTURE: CPT | Performed by: INTERNAL MEDICINE

## 2020-05-23 LAB
ALBUMIN SERPL-MCNC: 3.8 G/DL (ref 3.4–5)
ALP SERPL-CCNC: 73 U/L (ref 40–150)
ALT SERPL W P-5'-P-CCNC: 33 U/L (ref 0–50)
AST SERPL W P-5'-P-CCNC: 24 U/L (ref 0–45)
BILIRUB DIRECT SERPL-MCNC: 0.1 MG/DL (ref 0–0.2)
BILIRUB SERPL-MCNC: 0.5 MG/DL (ref 0.2–1.3)
PROT SERPL-MCNC: 7.1 G/DL (ref 6.8–8.8)
TSH SERPL DL<=0.005 MIU/L-ACNC: 2.64 MU/L (ref 0.4–4)

## 2020-06-04 ENCOUNTER — HOSPITAL ENCOUNTER (OUTPATIENT)
Dept: CT IMAGING | Facility: CLINIC | Age: 55
Discharge: HOME | End: 2020-06-04
Attending: INTERNAL MEDICINE | Admitting: INTERNAL MEDICINE
Payer: COMMERCIAL

## 2020-06-04 DIAGNOSIS — R10.11 ABDOMINAL PAIN, RIGHT UPPER QUADRANT: ICD-10-CM

## 2020-06-04 PROCEDURE — 74177 CT ABD & PELVIS W/CONTRAST: CPT

## 2020-06-04 PROCEDURE — 25000128 H RX IP 250 OP 636: Performed by: INTERNAL MEDICINE

## 2020-06-04 PROCEDURE — 25000125 ZZHC RX 250: Performed by: INTERNAL MEDICINE

## 2020-06-04 RX ORDER — IOPAMIDOL 755 MG/ML
90 INJECTION, SOLUTION INTRAVASCULAR ONCE
Status: COMPLETED | OUTPATIENT
Start: 2020-06-04 | End: 2020-06-04

## 2020-06-04 RX ADMIN — IOPAMIDOL 90 ML: 755 INJECTION, SOLUTION INTRAVENOUS at 12:46

## 2020-06-04 RX ADMIN — SODIUM CHLORIDE 66 ML: 9 INJECTION, SOLUTION INTRAVENOUS at 12:46

## 2020-06-05 ENCOUNTER — TELEPHONE (OUTPATIENT)
Dept: FAMILY MEDICINE | Facility: CLINIC | Age: 55
End: 2020-06-05

## 2020-06-05 DIAGNOSIS — K83.8 DILATION OF COMMON BILE DUCT: Primary | ICD-10-CM

## 2020-06-05 NOTE — TELEPHONE ENCOUNTER
I discussed with patient the ct results. She has interstim so can not get mri.  I also called Dr. Montalvo of rads.  Will get us and if no answer the may need ercp    Emile Jara M.D.

## 2020-06-09 ENCOUNTER — HOSPITAL ENCOUNTER (OUTPATIENT)
Dept: ULTRASOUND IMAGING | Facility: CLINIC | Age: 55
Discharge: HOME | End: 2020-06-09
Attending: INTERNAL MEDICINE | Admitting: INTERNAL MEDICINE
Payer: COMMERCIAL

## 2020-06-09 DIAGNOSIS — K83.8 DILATION OF COMMON BILE DUCT: ICD-10-CM

## 2020-06-09 PROCEDURE — 76700 US EXAM ABDOM COMPLETE: CPT

## 2020-06-16 ENCOUNTER — PATIENT OUTREACH (OUTPATIENT)
Dept: GASTROENTEROLOGY | Facility: CLINIC | Age: 55
End: 2020-06-16

## 2020-06-16 ENCOUNTER — TELEPHONE (OUTPATIENT)
Dept: FAMILY MEDICINE | Facility: CLINIC | Age: 55
End: 2020-06-16

## 2020-06-16 NOTE — TELEPHONE ENCOUNTER
Per Dr Jara, referred to Dr. Hurst for dilated pancreatic duct.     Per Dr. Hurst:  recommend an EUS with tandem ERCP if a target is found on EUS. Of course, EUS will also allow us to exclude small pancreatic, biliary and ampullary lesions. If we dont meet with her before, we will then need to meet with her after in particular if we end up not doing the ERCP for some reason.     Clinic scheduled for 7/9. Will schedule procedure as above at [facility] after clinic visit.     Mervat Escalante RN Care Coordinator

## 2020-06-16 NOTE — TELEPHONE ENCOUNTER
I called patient re us result.  I also contacted gi Dr. Catalan and he has set up video visit with patient.      She has ongoing pain and fatigue.     She has appt with gi and will call if nothing found    Emile Jara M.D.

## 2020-07-09 ENCOUNTER — VIRTUAL VISIT (OUTPATIENT)
Dept: GASTROENTEROLOGY | Facility: CLINIC | Age: 55
End: 2020-07-09

## 2020-07-09 VITALS — HEIGHT: 68 IN | BODY MASS INDEX: 26.67 KG/M2 | WEIGHT: 176 LBS

## 2020-07-09 DIAGNOSIS — R93.3 ABNORMAL FINDING ON GI TRACT IMAGING: Primary | ICD-10-CM

## 2020-07-09 RX ORDER — RIZATRIPTAN BENZOATE 10 MG/1
TABLET ORAL
COMMUNITY
Start: 2020-06-11 | End: 2023-01-18

## 2020-07-09 RX ORDER — BUPROPION HYDROCHLORIDE 150 MG/1
TABLET ORAL EVERY MORNING
COMMUNITY
Start: 2020-05-27

## 2020-07-09 RX ORDER — ONABOTULINUMTOXINA 200 [USP'U]/1
INJECTION, POWDER, LYOPHILIZED, FOR SOLUTION INTRADERMAL; INTRAMUSCULAR
COMMUNITY
Start: 2020-06-10

## 2020-07-09 ASSESSMENT — PAIN SCALES - GENERAL: PAINLEVEL: MILD PAIN (2)

## 2020-07-09 ASSESSMENT — MIFFLIN-ST. JEOR: SCORE: 1446.83

## 2020-07-09 NOTE — PROGRESS NOTES
"Vero Lua is a 54 year old female who is being evaluated via a billable video visit.      The patient has been notified of following:     \"This video visit will be conducted via a call between you and your physician/provider. We have found that certain health care needs can be provided without the need for an in-person physical exam.  This service lets us provide the care you need with a video conversation.  If a prescription is necessary we can send it directly to your pharmacy.  If lab work is needed we can place an order for that and you can then stop by our lab to have the test done at a later time.    Video visits are billed at different rates depending on your insurance coverage.  Please reach out to your insurance provider with any questions.    If during the course of the call the physician/provider feels a video visit is not appropriate, you will not be charged for this service.\"    Patient has given verbal consent for Video visit? Yes  How would you like to obtain your AVS? MyChart  Patient would like the video invitation sent by: Phone  Will anyone else be joining your video visit? No    Video-Visit Details    Type of service:  Video Visit    Video Start Time: 0745  Video End Time: 0815    Originating Location (pt. Location): Home    Distant Location (provider location):  [facility]     Platform used for Video Visit: [facility]    Referring provider Emile Jara  Query Abnormal GI Imaging    HPI  Ms Lua is a 53yo woman with a family history of pancreatic and liver cancer who had been struggling with right sided abdominal pain prompting CT and US demonstrating a 12mm common bile duct without intrahepatic dilation, stone or stenosis. She has not had a history of pancreaticobiliary disease or bowel disease and her liver studies have been unremarkable throughout.     The pain began in March and would be worse with eating. There is no clear relationship with any particular food and " would just come and go, lasting less than a day. It is not debilitating. It is not related to bowel habits and she has no change in the stools. She describes the pain in the right hypochondrial region without radiation. She describes it as dull without spasm. This occurs about 3-4x per week and can last hours. She thinks its getting a bit better over time. She does not have any blood in her stool. She has not taken any medication for this. She has heartburn at times and takes TUMS. She has not had nausea or vomiting. She is unsure if it occurs more frequently with fatty meals.    She has had her colonoscopy 4y back and this was grossly unremarkable save for a couple polyps and she is due again in one year.    Her mom was recently diagnosed with pancreatic cancer at age 78 and is undergoing chemotherapy at this moment. Her grandparents also had pancreatic cancer. Her uncle and aunt  of liver cancer and were not drinkers.     She has a bladder pace maker and can not get MRI.    Medications  Current Outpatient Medications   Medication     BOTOX 200 units injection     buPROPion (WELLBUTRIN XL) 150 MG 24 hr tablet     clonazePAM (KLONOPIN) 1 MG tablet     levothyroxine (SYNTHROID/LEVOTHROID) 100 MCG tablet     loratadine (CLARITIN) 10 MG tablet     topiramate (TOPAMAX) 200 MG tablet     vortioxetine (BRINTELLIX) 10 MG tablet     zolpidem (AMBIEN) 5 MG tablet     rizatriptan (MAXALT) 10 MG tablet     No current facility-administered medications for this visit.      Past Medical  Past Medical History:   Diagnosis Date     Adenomatous polyp of colon     fu 5 years, [place] gi     Anxiety      Chronic insomnia     on ambien and klonopin for over 10 years together     Depression, major, in partial remission (H)     was seeing psyche until , not since then     Fibromyalgia      Hypothyroid      Interstitial cystitis     urol [place] and had interstem     Migraines     Dr. Cash of neuro, got botox  via [facility]  Dr. Kevin, now Dr. Watkins     Restless leg syndrome      Urgency of urination     interstim     Past Surgical  Past Surgical History:   Procedure Laterality Date     interstim procedure  2015, 2010 and 2008    [facility]     Social History  Social History     Socioeconomic History     Marital status:      Spouse name: Not on file     Number of children: 1     Years of education: Not on file     Highest education level: Not on file   Occupational History     Occupation: homemaker     Employer: NONE    Social Needs     Financial resource strain: Not on file     Food insecurity     Worry: Not on file     Inability: Not on file     Transportation needs     Medical: Not on file     Non-medical: Not on file   Tobacco Use     Smoking status: Never Smoker     Smokeless tobacco: Never Used   Substance and Sexual Activity     Alcohol use: No     Alcohol/week: 0.0 standard drinks     Drug use: No     Sexual activity: Yes     Partners: Male     Birth control/protection: Post-menopausal   Lifestyle     Physical activity     Days per week: Not on file     Minutes per session: Not on file     Stress: Not on file   Relationships     Social connections     Talks on phone: Not on file     Gets together: Not on file     Attends Buddhist service: Not on file     Active member of club or organization: Not on file     Attends meetings of clubs or organizations: Not on file     Relationship status: Not on file     Intimate partner violence     Fear of current or ex partner: Not on file     Emotionally abused: Not on file     Physically abused: Not on file     Forced sexual activity: Not on file   Other Topics Concern     Parent/sibling w/ CABG, MI or angioplasty before 65F 55M? Not Asked   Social History Narrative     Not on file     Family History  Family History   Problem Relation Age of Onset     Mental Illness Mother      Depression Sister      Depression Father      Osteoporosis Maternal Grandmother       Objective:  Reported vitals:  There were no vitals taken for this visit.   GEN: Healthy, alert and no distress  PSYCH: Alert and oriented times 3; coherent speech, normal   rate and volume, able to articulate logical thoughts, able   to abstract reason, no tangential thoughts, no hallucinations   or delusions  His affect is normal  RESP: No cough, no audible wheezing, able to talk in full sentences  Remainder of exam unable to be completed due to video visits     Outside Imaging summaries:    Assessment and plan:  Ms Lua is a 55yo woman with RUQ pain of unclear etiology found to have a mild extrahepatic dilation without other concerning features on CT and US. She also has a first degree and two third degree relatives with pancreatic cancer and two second degree relatives who did not drink with liver cancer. Of note she can not get an MRI. Her differential includes microlithiasis, ampullary stenosis, irritable bowel (non pancreaticobiliary etiologies also ulceration), and less likely pancreaticobiliary or ampullary mass.    We will organize an endoscopic ultrasound which will allow us to explore many of the possibilities and an ERCP will be done in tandem if stone or stenosis are suspected. Ulceration and mass will be excluded as well. If no lesions are found, and or she does not improve from biliary sphincterotomy, we will explore irritable therapies such as with antispasmodics.    We discussed the technique and risks of the procedures and all of her questions were answered.     It was a pleasure to participate in the care of this patient; please contact us with any further questions.  A total of 45 minutes was spent in evaluation with this patient, >50% of which was counseling regarding the above delineated issues.    Juan Hrust MD PhD ELEONORA TEJEDA  Director of Endoscopy  Associate Professor of Medicine, Surgery and Pediatrics  Interventional and Therapeutic Endoscopy    [facility]  [facility]  Division of Gastroenterology and Hepatology  [facility] [address], [place] [address]    New Consultations  [phone]  Procedure Scheduling [phone]  Clinical Nurse Coordinator [phone]  Clinical Fax   [phone]  Administrative   [phone]  Administrative Fax  [phone]

## 2020-07-09 NOTE — NURSING NOTE
"Chief Complaint   Patient presents with     Consult     Dilated Bile Duct, Family Hx of Pancreatic ca       Vitals:    07/09/20 0731   Weight: 79.8 kg (176 lb)   Height: 1.727 m (5' 8\")       Body mass index is 26.76 kg/m .      Vero Lua is a 54 year old female who is being evaluated via a billable video visit.      The patient has been notified of following:     \"This video visit will be conducted via a call between you and your physician/provider. We have found that certain health care needs can be provided without the need for an in-person physical exam.  This service lets us provide the care you need with a video conversation.  If a prescription is necessary we can send it directly to your pharmacy.  If lab work is needed we can place an order for that and you can then stop by our lab to have the test done at a later time.    Video visits are billed at different rates depending on your insurance coverage.  Please reach out to your insurance provider with any questions.    If during the course of the call the physician/provider feels a video visit is not appropriate, you will not be charged for this service.\"    Patient has given verbal consent for Video visit? Yes  How would you like to obtain your AVS? Manuela  Patient would like the video invitation sent by: Send to e-mail at: [email or web address]  Will anyone else be joining your video visit? No      Angela Vasquez LPN                          "

## 2020-07-09 NOTE — LETTER
7/9/2020     RE: Vero Lua  [address]  [place] [address]    Dear Colleague,    Thank you for referring your patient, Vero Lua, to the [facility]. Please see a copy of my visit note below.    Vero Lua is a 54 year old female who is being evaluated via a billable video visit.    Video-Visit Details  Type of service:  Video Visit  Video Start Time: 0745  Video End Time: 0815  Originating Location (pt. Location): Home  Distant Location (provider location):  [facility]   Platform used for Video Visit: Inkomerce    Referring provider Emile Jara  Query Abnormal GI Imaging    HPI  Ms Lua is a 55yo woman with a family history of pancreatic and liver cancer who had been struggling with right sided abdominal pain prompting CT and US demonstrating a 12mm common bile duct without intrahepatic dilation, stone or stenosis. She has not had a history of pancreaticobiliary disease or bowel disease and her liver studies have been unremarkable throughout.     The pain began in March and would be worse with eating. There is no clear relationship with any particular food and would just come and go, lasting less than a day. It is not debilitating. It is not related to bowel habits and she has no change in the stools. She describes the pain in the right hypochondrial region without radiation. She describes it as dull without spasm. This occurs about 3-4x per week and can last hours. She thinks its getting a bit better over time. She does not have any blood in her stool. She has not taken any medication for this. She has heartburn at times and takes TUMS. She has not had nausea or vomiting. She is unsure if it occurs more frequently with fatty meals.    She has had her colonoscopy 4y back and this was grossly unremarkable save for a couple polyps and she is due again in one year.    Her mom was recently diagnosed with pancreatic cancer at age 78 and is undergoing  chemotherapy at this moment. Her grandparents also had pancreatic cancer. Her uncle and aunt  of liver cancer and were not drinkers.     She has a bladder pace maker and can not get MRI.    Medications  Current Outpatient Medications   Medication     BOTOX 200 units injection     buPROPion (WELLBUTRIN XL) 150 MG 24 hr tablet     clonazePAM (KLONOPIN) 1 MG tablet     levothyroxine (SYNTHROID/LEVOTHROID) 100 MCG tablet     loratadine (CLARITIN) 10 MG tablet     topiramate (TOPAMAX) 200 MG tablet     vortioxetine (BRINTELLIX) 10 MG tablet     zolpidem (AMBIEN) 5 MG tablet     rizatriptan (MAXALT) 10 MG tablet     No current facility-administered medications for this visit.      Past Medical  Past Medical History:   Diagnosis Date     Adenomatous polyp of colon     fu 5 years, [place] gi     Anxiety      Chronic insomnia     on ambien and klonopin for over 10 years together     Depression, major, in partial remission (H)     was seeing psyche until , not since then     Fibromyalgia      Hypothyroid      Interstitial cystitis     urol [place] and had interstem     Migraines     Dr. Cash of neuro, got botox  via [facility] Dr. Kevin, now Dr. Watkins     Restless leg syndrome      Urgency of urination     interstim     Past Surgical  Past Surgical History:   Procedure Laterality Date     interstim procedure  ,  and     [facility]     Social History  Social History     Socioeconomic History     Marital status:      Spouse name: Not on file     Number of children: 1     Years of education: Not on file     Highest education level: Not on file   Occupational History     Occupation: homemaker     Employer: NONE    Social Needs     Financial resource strain: Not on file     Food insecurity     Worry: Not on file     Inability: Not on file     Transportation needs     Medical: Not on file     Non-medical: Not on file   Tobacco Use     Smoking status: Never Smoker     Smokeless tobacco: Never  Used   Substance and Sexual Activity     Alcohol use: No     Alcohol/week: 0.0 standard drinks     Drug use: No     Sexual activity: Yes     Partners: Male     Birth control/protection: Post-menopausal   Lifestyle     Physical activity     Days per week: Not on file     Minutes per session: Not on file     Stress: Not on file   Relationships     Social connections     Talks on phone: Not on file     Gets together: Not on file     Attends Sabianism service: Not on file     Active member of club or organization: Not on file     Attends meetings of clubs or organizations: Not on file     Relationship status: Not on file     Intimate partner violence     Fear of current or ex partner: Not on file     Emotionally abused: Not on file     Physically abused: Not on file     Forced sexual activity: Not on file   Other Topics Concern     Parent/sibling w/ CABG, MI or angioplasty before 65F 55M? Not Asked   Social History Narrative     Not on file     Family History  Family History   Problem Relation Age of Onset     Mental Illness Mother      Depression Sister      Depression Father      Osteoporosis Maternal Grandmother      Objective:  Reported vitals:  There were no vitals taken for this visit.   GEN: Healthy, alert and no distress  PSYCH: Alert and oriented times 3; coherent speech, normal   rate and volume, able to articulate logical thoughts, able   to abstract reason, no tangential thoughts, no hallucinations   or delusions  His affect is normal  RESP: No cough, no audible wheezing, able to talk in full sentences  Remainder of exam unable to be completed due to video visits     Outside Imaging summaries:    Assessment and plan:  Ms Lua is a 55yo woman with RUQ pain of unclear etiology found to have a mild extrahepatic dilation without other concerning features on CT and US. She also has a first degree and two third degree relatives with pancreatic cancer and two second degree relatives who did not drink with liver  cancer. Of note she can not get an MRI. Her differential includes microlithiasis, ampullary stenosis, irritable bowel (non pancreaticobiliary etiologies also ulceration), and less likely pancreaticobiliary or ampullary mass.    We will organize an endoscopic ultrasound which will allow us to explore many of the possibilities and an ERCP will be done in tandem if stone or stenosis are suspected. Ulceration and mass will be excluded as well. If no lesions are found, and or she does not improve from biliary sphincterotomy, we will explore irritable therapies such as with antispasmodics.    We discussed the technique and risks of the procedures and all of her questions were answered.     It was a pleasure to participate in the care of this patient; please contact us with any further questions.  A total of 45 minutes was spent in evaluation with this patient, >50% of which was counseling regarding the above delineated issues.    Juan Hurst MD PhD FACG MILLICENT TEJEDA  Director of Endoscopy  Associate Professor of Medicine, Surgery and Pediatrics  Interventional and Therapeutic Endoscopy    [facility]  Division of Gastroenterology and Hepatology  [address] [address]    New Consultations  [phone]  Procedure Scheduling [phone]  Clinical Nurse Coordinator [phone]  Clinical Fax   [phone]  Administrative   [phone]  Administrative Fax  [phone]          Again, thank you for allowing me to participate in the care of your patient.      Sincerely,      Juan Hurst MD

## 2020-07-10 ENCOUNTER — PREP FOR PROCEDURE (OUTPATIENT)
Dept: GASTROENTEROLOGY | Facility: CLINIC | Age: 55
End: 2020-07-10

## 2020-07-10 ENCOUNTER — PATIENT OUTREACH (OUTPATIENT)
Dept: GASTROENTEROLOGY | Facility: CLINIC | Age: 55
End: 2020-07-10

## 2020-07-10 DIAGNOSIS — R93.3 ABNORMAL FINDING ON GI TRACT IMAGING: Primary | ICD-10-CM

## 2020-07-10 DIAGNOSIS — Z11.59 ENCOUNTER FOR SCREENING FOR OTHER VIRAL DISEASES: Primary | ICD-10-CM

## 2020-07-10 NOTE — TELEPHONE ENCOUNTER
Called to discuss with patient. Explained they can expect a call for date and time for procedure, will need a , someone to stay with them for 24 hours and should stay in town for 24 hours (within 45 min of Hospital) post procedure    Patient needs to get pre-op physical completed and will fax a copy to us along with bringing a hard copy with them. Fax number given. [phone] *If you do not get a preop physical, your procedure could be cancelled, patient voiced understanding*    Preop Plan: will go to PCP    Med Review    Blood thinner -  no  ASA - no  Diabetic - no    COVID test discussed:robb get on 7/17 at [facility]    Patient Education r/t procedure:discussed procedure      A pre-op nurse will call 1-2 days prior to the procedure. Is advised to be NPO/no solid food 8 hours before the procedure. Ok to drink clear liquids (Water, Apple Juice or Gatorade) up to 2 hours prior to procedure.     Other specific details/comments:none     Verbalized understanding of all instructions. All questions answered.

## 2020-07-10 NOTE — PATIENT INSTRUCTIONS
Follow up:    Dr. Hurst has outlined the following steps after your recent clinic visit:       We will organize an endoscopic ultrasound which will allow us to explore many of the possibilities and an ERCP will be done in tandem if stone or stenosis are suspected. Ulceration and mass will be excluded as well. If no lesions are found, and or she does not improve from biliary sphincterotomy, we will explore irritable therapies such as with antispasmodics.    You will need a preop physical within 30 days of your procedure and COVID test 3 days before your procedure.     Please call with any questions or concerns regarding your clinic visit today.    It is a pleasure being involved in your health care.    Contacts post-consultation depending on your need:    Schedule Clinic Appointments            [phone] # 1   M-F 7:30 - 5 pm    Mervat Escalante RN Care Coordinator  [phone]     OR Procedure Scheduling                             [phone]    My Chart is available 24 hours a day and is a secure way to access your records and communicate with your care team.  I strongly recommend signing up if you haven't already done so, if you are comfortable with computers.  If you would like to inquire about this or are having problems with My Chart access, you may call [phone] or go online at [email or web address].  Please allow at least 24 hours for a response and extra time on weekends and Holidays.

## 2020-07-15 ENCOUNTER — OFFICE VISIT (OUTPATIENT)
Dept: FAMILY MEDICINE | Facility: CLINIC | Age: 55
End: 2020-07-15
Payer: COMMERCIAL

## 2020-07-15 VITALS
WEIGHT: 179 LBS | OXYGEN SATURATION: 99 % | DIASTOLIC BLOOD PRESSURE: 76 MMHG | SYSTOLIC BLOOD PRESSURE: 108 MMHG | HEIGHT: 68 IN | HEART RATE: 71 BPM | BODY MASS INDEX: 27.13 KG/M2 | TEMPERATURE: 97.8 F

## 2020-07-15 DIAGNOSIS — Z01.818 PREOP GENERAL PHYSICAL EXAM: Primary | ICD-10-CM

## 2020-07-15 DIAGNOSIS — Z11.59 ENCOUNTER FOR SCREENING FOR OTHER VIRAL DISEASES: ICD-10-CM

## 2020-07-15 DIAGNOSIS — R93.3 ABNORMAL FINDING ON GI TRACT IMAGING: ICD-10-CM

## 2020-07-15 DIAGNOSIS — R10.11 RUQ ABDOMINAL PAIN: ICD-10-CM

## 2020-07-15 LAB — HGB BLD-MCNC: 13.2 G/DL (ref 11.7–15.7)

## 2020-07-15 PROCEDURE — 99214 OFFICE O/P EST MOD 30 MIN: CPT | Performed by: PHYSICIAN ASSISTANT

## 2020-07-15 PROCEDURE — 86769 SARS-COV-2 COVID-19 ANTIBODY: CPT | Mod: 90 | Performed by: PHYSICIAN ASSISTANT

## 2020-07-15 PROCEDURE — 85018 HEMOGLOBIN: CPT | Performed by: PHYSICIAN ASSISTANT

## 2020-07-15 PROCEDURE — 36415 COLL VENOUS BLD VENIPUNCTURE: CPT | Performed by: PHYSICIAN ASSISTANT

## 2020-07-15 PROCEDURE — 99000 SPECIMEN HANDLING OFFICE-LAB: CPT | Performed by: PHYSICIAN ASSISTANT

## 2020-07-15 ASSESSMENT — MIFFLIN-ST. JEOR: SCORE: 1460.44

## 2020-07-15 ASSESSMENT — PATIENT HEALTH QUESTIONNAIRE - PHQ9: SUM OF ALL RESPONSES TO PHQ QUESTIONS 1-9: 3

## 2020-07-15 NOTE — PROGRESS NOTES
[facility]  [address] [facility] [address]  [phone]  Dept: [phone]    PRE-OP EVALUATION:  Today's date: 7/15/2020    Vero Lua (: 1965) presents for pre-operative evaluation assessment as requested by Juan Zeng MD.  She requires evaluation and anesthesia risk assessment prior to undergoing surgery/procedure for treatment of Abnormal finding on GI tract imaging.    Proposed Surgery/ Procedure: ENDOSCOPIC ULTRASOUND, ESOPHAGOSCOPY / UPPER GASTROINTESTINAL TRACT (GI), ENDOSCOPIC RETROGRADE CHOLANGIOPANCREATOGRAPHY  Date of Surgery/ Procedure: 2020  Time of Surgery/ Procedure: 12:30 pm   Hospital/Surgical Facility: [facility]   Primary Physician: Emile Jara  Type of Anesthesia Anticipated: General    Patient has a Health Care Directive or Living Will:  NO    1. NO - Do you have a history of heart attack, stroke, stent, bypass or surgery on an artery in the head, neck, heart or legs?  2. NO - Do you ever have any pain or discomfort in your chest?  3. NO - Do you have a history of  Heart Failure?  4. NO - Are you troubled by shortness of breath when: walking on the level, up a slight hill or at night?  5. NO - Do you currently have a cold, bronchitis or other respiratory infection?  6. NO - Do you have a cough, shortness of breath or wheezing?  7. NO - Do you sometimes get pains in the calves of your legs when you walk?  8. NO - Do you or anyone in your family have previous history of blood clots?  9. NO - Do you or does anyone in your family have a serious bleeding problem such as prolonged bleeding following surgeries or cuts?  10. NO - Have you ever had problems with anemia or been told to take iron pills?  11. NO - Have you had any abnormal blood loss such as black, tarry or bloody stools, or abnormal vaginal bleeding?  12. NO - Have you ever had a blood transfusion?  13. NO - Have you or any of your relatives ever  had problems with anesthesia?  14. NO - Do you have sleep apnea, excessive snoring or daytime drowsiness?  15. NO - Do you have any prosthetic heart valves?  16. NO - Do you have prosthetic joints?  17. NO - Is there any chance that you may be pregnant?      HPI:     HPI related to upcoming procedure: pt with RUQ pain since around April 2020 found to have dilated extrahepatic common bile duct.      MEDICAL HISTORY:     Patient Active Problem List    Diagnosis Date Noted     Abnormal finding on GI tract imaging 07/10/2020     Priority: Medium     Added automatically from request for surgery [id number]       Chronic insomnia      Priority: Medium     on ambien and klonopin for over 10 years together       Adenomatous polyp of colon      Priority: Medium     fu 5 years, [place] gi       Anxiety      Priority: Medium     Depression, major, in partial remission (H)      Priority: Medium     Fibromyalgia      Priority: Medium     Restless leg syndrome      Priority: Medium     Interstitial cystitis      Priority: Medium     urol [place] and had interstem       Migraines      Priority: Medium     Dr. Cash of neuro       Hypothyroid      Priority: Medium      Past Medical History:   Diagnosis Date     Adenomatous polyp of colon 5/16    fu 5 years, [place] gi     Anxiety      Chronic insomnia     on ambien and klonopin for over 10 years together     Depression, major, in partial remission (H)     was seeing psyche until 2015, not since then     Fibromyalgia 1998     Hypothyroid 90's     Interstitial cystitis     urol [place] and had interstem     Migraines     Dr. Cash of neuro, got botox 2012 via [facility] Dr. Kevin, now Dr. Watkins     Restless leg syndrome      Urgency of urination     interstim     Past Surgical History:   Procedure Laterality Date     interstim procedure  2015, 2010 and 2008    [facility]     Current Outpatient Medications   Medication Sig Dispense Refill     BOTOX 200 units injection        buPROPion (WELLBUTRIN XL)  "150 MG 24 hr tablet        clonazePAM (KLONOPIN) 1 MG tablet Take 1 tablet (1 mg) by mouth At Bedtime 90 tablet 0     levothyroxine (SYNTHROID/LEVOTHROID) 100 MCG tablet Take 1 tablet (100 mcg) by mouth daily 90 tablet 3     loratadine (CLARITIN) 10 MG tablet Take 1 tablet (10 mg) by mouth daily 30 tablet 1     rizatriptan (MAXALT) 10 MG tablet        topiramate (TOPAMAX) 200 MG tablet Take 200 mg by mouth daily        vortioxetine (BRINTELLIX) 10 MG tablet Take 1 tablet (10 mg) by mouth daily 90 tablet 3     zolpidem (AMBIEN) 5 MG tablet TAKE 1 TABLET BY MOUTH AT BEDTIME AS NEEDED 90 tablet 0     OTC products: None, except as noted above    Allergies   Allergen Reactions     Adhesive Tape Rash      Latex Allergy: NO    Social History     Tobacco Use     Smoking status: Never Smoker     Smokeless tobacco: Never Used   Substance Use Topics     Alcohol use: No     Alcohol/week: 0.0 standard drinks     History   Drug Use No       REVIEW OF SYSTEMS:   Constitutional, neuro, ENT, endocrine, pulmonary, cardiac, gastrointestinal, genitourinary, musculoskeletal, integument and psychiatric systems are negative, except as otherwise noted.    EXAM:   /76 (Patient Position: Sitting)   Pulse 71   Temp 97.8  F (36.6  C) (Temporal)   Ht 1.727 m (5' 8\")   Wt 81.2 kg (179 lb)   LMP 06/27/2012   SpO2 99%   BMI 27.22 kg/m      GENERAL APPEARANCE: healthy, alert and no distress     EYES: EOMI, PERRL     HENT: ear canals and TM's normal and nose and mouth without ulcers or lesions     NECK: no adenopathy, no asymmetry, masses, or scars and thyroid normal to palpation     RESP: lungs clear to auscultation - no rales, rhonchi or wheezes     CV: regular rates and rhythm, normal S1 S2, no S3 or S4 and no murmur, click or rub     ABDOMEN:  soft, nontender, no HSM or masses and bowel sounds normal     MS: extremities normal- no gross deformities noted, no evidence of inflammation in joints, FROM in all extremities.     SKIN: no " suspicious lesions or rashes     NEURO: Normal strength and tone, sensory exam grossly normal, mentation intact and speech normal     PSYCH: mentation appears normal. and affect normal/bright     LYMPHATICS: No cervical adenopathy    DIAGNOSTICS:     Results for orders placed or performed in visit on 07/15/20   Hemoglobin     Status: None   Result Value Ref Range    Hemoglobin 13.2 11.7 - 15.7 g/dL            IMPRESSION:   Reason for surgery/procedure: dilated common bile duct/ RUQ pain  Diagnosis/reason for consult: ENDOSCOPIC ULTRASOUND, ESOPHAGOSCOPY / UPPER GASTROINTESTINAL TRACT (GI)     The proposed surgical procedure is considered LOW risk.        ICD-10-CM    1. Preop general physical exam  Z01.818 Hemoglobin   2. RUQ abdominal pain  R10.11    3. Abnormal finding on GI tract imaging  R93.3        RECOMMENDATIONS:     APPROVAL GIVEN to proceed with proposed procedure, without further diagnostic evaluation. Pt to take am meds with sip of water day of procedure. Avoid asa and nsaids.       Signed Electronically by: Esther Sarah PA-C    Copy of this evaluation report is provided to requesting physician.    [facility] Preop Guidelines    Revised Cardiac Risk Index

## 2020-07-15 NOTE — LETTER
July 20, 2020        Vero SMITH Lua  [address]  [place] [address]      COVID-19 Antibody Screen   Date Value Ref Range Status   07/15/2020 Negative  Final     Comment:     No COVID-19 antibodies detected.  Patients within 10 days of symptom onset for   COVID-19 may not produce sufficient levels of detectable antibodies.    Immunocompromised COVID-19 patients may take longer to develop antibodies.       COVID-19 Antibody, IgG Titer   Date Value Ref Range Status   07/15/2020 Not Applicable  Final     Comment:     Qualitative screen for total antibodies to COVID-19 (SARS-CoV-2) with   semi-quantitative measurement of IgG COVID-19 antibodies by endpoint titer.    COVID-19 antibodies may be elevated due to a past or current infection.  Negative results do not rule out COVID-19 infection.  Results from antibody   testing should not be used as the sole basis to diagnose or exclude SARS-CoV-2   infection or to inform infection status.  COVID-19 PCR test should be ordered   if current infection is suspected.  False positive results may occur in rare   cases due to cross-reacting antibodies.  This test was developed and its performance characteristics determined by the   [facility] ([facility]),   which is regulated under CLIA as qualified to perform high-complexity testing.    This test has not been reviewed by the FDA.  Testing performed by [facility], [facility], [address], [address], [place] [address]         No results found for: COVAB      You have tested NEGATIVE for COVID-19 antibodies. This suggests you have not had or been exposed to COVID-19. But it does not mean that for sure.     The test finds antibodies in most people 10 days after they get sick. For some people, it takes longer than 10 days for antibodies to show up. Others may never show antibodies against COVID-19, especially if they  have weak immune systems.    If you have COVID-19 symptoms now, please stay home and away from others.     What is antibody testing?    This is a kind of blood test. We take a small sample of your blood, and then test it for something called  antibodies.      Your body makes antibodies to fight infection. If your blood has antibodies for a certain germ, it means you ve been infected with that germ in the past.     Sometimes, antibodies stay in your body for years after you ve had the infection. They can be there even if the germ didn t make you sick. They are a sign that your body fought off the infection.    Will this test find antibodies in everyone who s had COVID-19?    No. The test finds antibodies in most people 10 days after they get sick. For some people, it takes longer than 10 days for antibodies to show up. Others may never show antibodies against COVID-19, especially if they have weak immune systems.    What does it mean if the test finds COVID-19 antibodies?    If we find these antibodies, it suggests:     This person has had the virus.     Their body s immune system fought the virus.     We don t know if this will help protect someone from getting COVID-19 again. Scientists are still learning about this.    What are the signs of COVID-19?    Signs of COVID-19 can appear from 2 to 14 days (up to 2 weeks) after you re infected. Some people have no symptoms or only mild symptoms. Others get very sick. The most common symptoms are:          Cough    Shortness of breath or trouble breathing  Or at least 2 of these symptoms:    Fever    Chills    Repeated shaking with chills    Muscle pain    Headache    Sore throat    Losing your sense of taste or smell    You may have other symptoms. Please contact your doctor or clinic for any symptoms that worry you.    Where can I get more information?     To learn the [place] s guidelines for staying home, please visit the [facility] Department of Health website at  [email or web address].[email or web address]/diseases/coronavirus/basics.html    To learn more about COVID-19 and how to care for yourself at home, please visit the CDC website at https://www.cdc.gov/coronavirus/2019-ncov/about/steps-when-sick.html    For more options for care at [facility] [place] [facility], please visit our website at [email or web address]    [place] [facility] ([facility]) COVID-19 Hotline:  [phone]

## 2020-07-17 DIAGNOSIS — Z20.822 COVID-19 RULED OUT: Primary | ICD-10-CM

## 2020-07-17 LAB
COVID-19 SPIKE RBD ABY TITER: NORMAL
COVID-19 SPIKE RBD ABY: NEGATIVE

## 2020-07-17 PROCEDURE — U0003 INFECTIOUS AGENT DETECTION BY NUCLEIC ACID (DNA OR RNA); SEVERE ACUTE RESPIRATORY SYNDROME CORONAVIRUS 2 (SARS-COV-2) (CORONAVIRUS DISEASE [COVID-19]), AMPLIFIED PROBE TECHNIQUE, MAKING USE OF HIGH THROUGHPUT TECHNOLOGIES AS DESCRIBED BY CMS-2020-01-R: HCPCS | Performed by: INTERNAL MEDICINE

## 2020-07-18 LAB
SARS-COV-2 RNA SPEC QL NAA+PROBE: NOT DETECTED
SPECIMEN SOURCE: NORMAL

## 2020-07-20 ENCOUNTER — ANESTHESIA (OUTPATIENT)
Dept: SURGERY | Facility: CLINIC | Age: 55
End: 2020-07-20
Payer: COMMERCIAL

## 2020-07-20 ENCOUNTER — ANESTHESIA EVENT (OUTPATIENT)
Dept: SURGERY | Facility: CLINIC | Age: 55
End: 2020-07-20
Payer: COMMERCIAL

## 2020-07-20 ENCOUNTER — HOSPITAL ENCOUNTER (OUTPATIENT)
Facility: CLINIC | Age: 55
Discharge: HOME | End: 2020-07-20
Attending: INTERNAL MEDICINE | Admitting: INTERNAL MEDICINE
Payer: COMMERCIAL

## 2020-07-20 VITALS
TEMPERATURE: 96.5 F | DIASTOLIC BLOOD PRESSURE: 74 MMHG | RESPIRATION RATE: 12 BRPM | SYSTOLIC BLOOD PRESSURE: 124 MMHG | OXYGEN SATURATION: 100 % | BODY MASS INDEX: 26.99 KG/M2 | WEIGHT: 178.1 LBS | HEIGHT: 68 IN | HEART RATE: 61 BPM

## 2020-07-20 DIAGNOSIS — R93.3 ABNORMAL FINDING ON GI TRACT IMAGING: ICD-10-CM

## 2020-07-20 LAB — UPPER EUS: NORMAL

## 2020-07-20 PROCEDURE — 37000008 ZZH ANESTHESIA TECHNICAL FEE, 1ST 30 MIN: Performed by: INTERNAL MEDICINE

## 2020-07-20 PROCEDURE — 36000050 ZZH SURGERY LEVEL 2 1ST 30 MIN: Performed by: INTERNAL MEDICINE

## 2020-07-20 PROCEDURE — 37000009 ZZH ANESTHESIA TECHNICAL FEE, EACH ADDTL 15 MIN: Performed by: INTERNAL MEDICINE

## 2020-07-20 PROCEDURE — 25800030 ZZH RX IP 258 OP 636: Performed by: NURSE ANESTHETIST, CERTIFIED REGISTERED

## 2020-07-20 PROCEDURE — 36000052 ZZH SURGERY LEVEL 2 EA 15 ADDTL MIN: Performed by: INTERNAL MEDICINE

## 2020-07-20 PROCEDURE — 25000128 H RX IP 250 OP 636: Performed by: NURSE ANESTHETIST, CERTIFIED REGISTERED

## 2020-07-20 PROCEDURE — 71000012 ZZH RECOVERY PHASE 1 LEVEL 1 FIRST HR: Performed by: INTERNAL MEDICINE

## 2020-07-20 PROCEDURE — 40000170 ZZH STATISTIC PRE-PROCEDURE ASSESSMENT II: Performed by: INTERNAL MEDICINE

## 2020-07-20 PROCEDURE — 25000566 ZZH SEVOFLURANE, EA 15 MIN: Performed by: INTERNAL MEDICINE

## 2020-07-20 PROCEDURE — 71000027 ZZH RECOVERY PHASE 2 EACH 15 MINS: Performed by: INTERNAL MEDICINE

## 2020-07-20 PROCEDURE — 25000125 ZZHC RX 250: Performed by: NURSE ANESTHETIST, CERTIFIED REGISTERED

## 2020-07-20 RX ORDER — LIDOCAINE 40 MG/G
CREAM TOPICAL
Status: DISCONTINUED | OUTPATIENT
Start: 2020-07-20 | End: 2020-07-20 | Stop reason: HOSPADM

## 2020-07-20 RX ORDER — DEXAMETHASONE SODIUM PHOSPHATE 4 MG/ML
INJECTION, SOLUTION INTRA-ARTICULAR; INTRALESIONAL; INTRAMUSCULAR; INTRAVENOUS; SOFT TISSUE PRN
Status: DISCONTINUED | OUTPATIENT
Start: 2020-07-20 | End: 2020-07-20

## 2020-07-20 RX ORDER — ONDANSETRON 4 MG/1
4 TABLET, ORALLY DISINTEGRATING ORAL EVERY 30 MIN PRN
Status: DISCONTINUED | OUTPATIENT
Start: 2020-07-20 | End: 2020-07-20 | Stop reason: HOSPADM

## 2020-07-20 RX ORDER — SODIUM CHLORIDE, SODIUM LACTATE, POTASSIUM CHLORIDE, CALCIUM CHLORIDE 600; 310; 30; 20 MG/100ML; MG/100ML; MG/100ML; MG/100ML
INJECTION, SOLUTION INTRAVENOUS CONTINUOUS PRN
Status: DISCONTINUED | OUTPATIENT
Start: 2020-07-20 | End: 2020-07-20

## 2020-07-20 RX ORDER — NALOXONE HYDROCHLORIDE 0.4 MG/ML
.1-.4 INJECTION, SOLUTION INTRAMUSCULAR; INTRAVENOUS; SUBCUTANEOUS
Status: DISCONTINUED | OUTPATIENT
Start: 2020-07-20 | End: 2020-07-20 | Stop reason: HOSPADM

## 2020-07-20 RX ORDER — LIDOCAINE HYDROCHLORIDE 20 MG/ML
INJECTION, SOLUTION INFILTRATION; PERINEURAL PRN
Status: DISCONTINUED | OUTPATIENT
Start: 2020-07-20 | End: 2020-07-20

## 2020-07-20 RX ORDER — FENTANYL CITRATE 0.05 MG/ML
25-50 INJECTION, SOLUTION INTRAMUSCULAR; INTRAVENOUS
Status: DISCONTINUED | OUTPATIENT
Start: 2020-07-20 | End: 2020-07-20 | Stop reason: HOSPADM

## 2020-07-20 RX ORDER — PROPOFOL 10 MG/ML
INJECTION, EMULSION INTRAVENOUS PRN
Status: DISCONTINUED | OUTPATIENT
Start: 2020-07-20 | End: 2020-07-20

## 2020-07-20 RX ORDER — SODIUM CHLORIDE, SODIUM LACTATE, POTASSIUM CHLORIDE, CALCIUM CHLORIDE 600; 310; 30; 20 MG/100ML; MG/100ML; MG/100ML; MG/100ML
INJECTION, SOLUTION INTRAVENOUS CONTINUOUS
Status: DISCONTINUED | OUTPATIENT
Start: 2020-07-20 | End: 2020-07-20 | Stop reason: HOSPADM

## 2020-07-20 RX ORDER — ONDANSETRON 2 MG/ML
4 INJECTION INTRAMUSCULAR; INTRAVENOUS EVERY 30 MIN PRN
Status: DISCONTINUED | OUTPATIENT
Start: 2020-07-20 | End: 2020-07-20 | Stop reason: HOSPADM

## 2020-07-20 RX ORDER — ONDANSETRON 2 MG/ML
INJECTION INTRAMUSCULAR; INTRAVENOUS PRN
Status: DISCONTINUED | OUTPATIENT
Start: 2020-07-20 | End: 2020-07-20

## 2020-07-20 RX ORDER — HYDROMORPHONE HYDROCHLORIDE 1 MG/ML
.3-.5 INJECTION, SOLUTION INTRAMUSCULAR; INTRAVENOUS; SUBCUTANEOUS EVERY 5 MIN PRN
Status: DISCONTINUED | OUTPATIENT
Start: 2020-07-20 | End: 2020-07-20 | Stop reason: HOSPADM

## 2020-07-20 RX ORDER — FENTANYL CITRATE 50 UG/ML
INJECTION, SOLUTION INTRAMUSCULAR; INTRAVENOUS PRN
Status: DISCONTINUED | OUTPATIENT
Start: 2020-07-20 | End: 2020-07-20

## 2020-07-20 RX ADMIN — PHENYLEPHRINE HYDROCHLORIDE 100 MCG: 10 INJECTION INTRAVENOUS at 10:23

## 2020-07-20 RX ADMIN — PROPOFOL 200 MG: 10 INJECTION, EMULSION INTRAVENOUS at 10:12

## 2020-07-20 RX ADMIN — FENTANYL CITRATE 50 MCG: 50 INJECTION, SOLUTION INTRAMUSCULAR; INTRAVENOUS at 10:10

## 2020-07-20 RX ADMIN — ONDANSETRON 4 MG: 2 INJECTION INTRAMUSCULAR; INTRAVENOUS at 10:23

## 2020-07-20 RX ADMIN — DEXAMETHASONE SODIUM PHOSPHATE 4 MG: 4 INJECTION, SOLUTION INTRA-ARTICULAR; INTRALESIONAL; INTRAMUSCULAR; INTRAVENOUS; SOFT TISSUE at 10:23

## 2020-07-20 RX ADMIN — MIDAZOLAM 2 MG: 1 INJECTION INTRAMUSCULAR; INTRAVENOUS at 10:06

## 2020-07-20 RX ADMIN — PHENYLEPHRINE HYDROCHLORIDE 100 MCG: 10 INJECTION INTRAVENOUS at 10:28

## 2020-07-20 RX ADMIN — SODIUM CHLORIDE, POTASSIUM CHLORIDE, SODIUM LACTATE AND CALCIUM CHLORIDE: 600; 310; 30; 20 INJECTION, SOLUTION INTRAVENOUS at 10:05

## 2020-07-20 RX ADMIN — SUGAMMADEX 200 MG: 100 INJECTION, SOLUTION INTRAVENOUS at 10:40

## 2020-07-20 RX ADMIN — ROCURONIUM BROMIDE 30 MG: 10 INJECTION INTRAVENOUS at 10:12

## 2020-07-20 RX ADMIN — LIDOCAINE HYDROCHLORIDE 100 MG: 20 INJECTION, SOLUTION INFILTRATION; PERINEURAL at 10:12

## 2020-07-20 SDOH — HEALTH STABILITY: MENTAL HEALTH: CURRENT SMOKER: 0

## 2020-07-20 ASSESSMENT — MIFFLIN-ST. JEOR: SCORE: 1456.36

## 2020-07-20 NOTE — ANESTHESIA CARE TRANSFER NOTE
Patient: Vero Lua    Procedure(s):  DIAGNOSTIC ENDOSCOPIC ULTRASOUND, ESOPHAGOSCOPY / UPPER GASTROINTESTINAL TRACT    Diagnosis: Abnormal finding on GI tract imaging [R93.3]  Diagnosis Additional Information: No value filed.    Anesthesia Type:   General     Note:  Airway :Face Mask  Patient transferred to:PACU  Comments: Suctioned, spont resp ,lifts head  > 5 sec. Extubated with immediate exchange, to PACU, report to RN.Handoff Report: Identifed the Patient, Identified the Reponsible Provider, Reviewed the pertinent medical history, Discussed the surgical course, Reviewed Intra-OP anesthesia mangement and issues during anesthesia, Set expectations for post-procedure period and Allowed opportunity for questions and acknowledgement of understanding      Vitals: (Last set prior to Anesthesia Care Transfer)    CRNA VITALS  7/20/2020 1024 - 7/20/2020 1056      7/20/2020             SpO2:  100 %    Resp Rate (set):  10                Electronically Signed By: WILBER Clarke CRNA  July 20, 2020  10:56 AM

## 2020-07-20 NOTE — OP NOTE
Upper EUS  07/20/2020  9:52 AM  Rad Rslts    [facility]   [facility] Endoscopy Department   _______________________________________________________________________________   Patient Name: Vero Lua            Procedure Date: 7/20/2020 9:52 AM   MRN: [id number]                       Account Number: [id number]   YOB: 1965              Admit Type: Outpatient   Age: 54                                Note Status: Finalized   Attending MD: Juan Hurst MD   Total Sedation Time:   Instrument Name: [id number] EUS Linear   _______________________________________________________________________________       Procedure:                Upper EUS   Indications:              Common bile duct dilation (acquired) seen on CT                             scan, Abnormal ultrasound of the abdomen, Abdominal                             pain in the right upper quadrant   Providers:                Juan Hurst MD, [facility], Technician   Patient Profile:          Ms Lua is a 55yo woman with a strong family                             history of pancreas and liver cancer who has been                             struggling with RUQ pain and was found to have a                             dilated common duct on imaging. She now proceeds to                             EUS with tandem ERCP planned if appropriate based                             on EUS findings.   Referring MD:             Emile Jara MD   Medicines:                General Anesthesia   Complications:            No immediate complications.   _______________________________________________________________________________   Procedure:                Pre-Anesthesia Assessment:                             - Prior to the procedure, a History and Physical                             was performed, and patient medications and                             allergies were reviewed. The patient is competent.                              The risks and benefits of the procedure and the                             sedation options and risks were discussed with the                             patient. All questions were answered and informed                             consent was obtained. Patient identification and                             proposed procedure were verified by the nurse in                             the pre-procedure area. Mental Status Examination:                             alert and oriented. Airway Examination: Mallampati                             Class II (the uvula but not tonsillar pillars                             visualized). Respiratory Examination: clear to                             auscultation. CV Examination: normal. ASA Grade                             Assessment: I - A normal, healthy patient. After                             reviewing the risks and benefits, the patient was                             deemed in satisfactory condition to undergo the                             procedure. The anesthesia plan was to use general                             anesthesia. Immediately prior to administration of                             medications, the patient was re-assessed for                             adequacy to receive sedatives. The heart rate,                             respiratory rate, oxygen saturations, blood                             pressure, adequacy of pulmonary ventilation, and                             response to care were monitored throughout the                             procedure. The physical status of the patient was                             re-assessed after the procedure. After obtaining                             informed consent, the endoscope was passed under                             direct vision. Throughout the procedure, the                             patient's blood pressure, pulse, and oxygen                             saturations were monitored continuously.  The                             echoeendoscope was introduced through the mouth,                             and advanced to the second part of duodenum. The                             upper EUS was accomplished without difficulty. The                             patient tolerated the procedure well.                                                                                     Findings:        White light and endosonographic findings :        A curved linear was utilized throughout. Limited oblique white light        imaging excluded inflammation or ulcerations of the foregut. Tangential        views of the ampulla were without lesion. In terms of endosonography,        there was no evidence of concerning pancreatic, ampullary, biliary or        duodenal solid lesions. The gallbladder is in situ and contained mobile,        layering hyperechoic content (sludge) without wall thickening. The        common duct was traced from the bifurcation to the ampulla and was found        to be top normal caliber at 8.8mm with smooth taper to less than 3mm        within the ampulla. There was no intraductal asymmetric wall thickening        or internal solid component. A small, 4mm anechoic simple cyst was found        in the uncinate, though no other liquid or solid pancreatic lesions were        demonstrated within a diffusely homogeneous parenchyma that was without        foci, strands or lobularity. The main duct was decompressed throuhgout,        measurnig just under 3mm at the head with smooth taper to less than 1mm        in the body with further taper upsream. The duct was traditional and        ventral dominant without significant irregularity. There was no evidence        of peripancreatic, periportal, perigastric or celiac lymphadenopathy.        The major vasculature of the abdomen including the celiac trunk,        splenics, superior mesenterics, gastroduodenal and portal appeared        traditional and  unremarkable. Limited views of the left lobe, right lobe        caudate, left adrenal and spleen were without concerning lesion, though        the spleen appeared generous in size.                                                                                     Impression:               - No indication for tandem ERCP; we suspect the                             dilated common duct seen on imaging included the                             caliber of the cystic duct at the site of insertion                             - Gallbladder sludge without overt evidence of                             cholecystitis                             - No evidence of choledochlithiasis                             - No evidence of pancreaticobiliary or duodenal                             lesions save for a small (3mm) cystic lesion of the                             head of the pancreas; this may represent a side                             branched intraductal papillary mucinous neoplasm                             and therefore deserves noninvasive MRI/MRCP in one                             year                             - No evidence of lymphadenopathy or vascular                             abnormality                             - Grossly healthy appearing pancreatic parenchyma                             and duct   Recommendation:           - General anesthesia recovery with probable                             discharge home this morning/afternoon                             - HIDA scan as our next step in evaluation RUQ                             pain; if negative we will need to consider                             therapies for irritable bowel                             - MRI/MRCP in one year for follow up of small                             cystic lesion (as explained above) and for                             surveillance                             - The findings and recommendations were discussed                              with the patient and their family

## 2020-07-20 NOTE — ANESTHESIA PREPROCEDURE EVALUATION
Anesthesia Pre-Procedure Evaluation    Patient: Vero Lua   MRN: [id number] : 1965          Preoperative Diagnosis: Abnormal finding on GI tract imaging [R93.3]    Procedure(s):  ENDOSCOPIC ULTRASOUND, ESOPHAGOSCOPY / UPPER GASTROINTESTINAL TRACT (GI)  ENDOSCOPIC RETROGRADE CHOLANGIOPANCREATOGRAPHY    Past Medical History:   Diagnosis Date     Adenomatous polyp of colon     fu 5 years, [place] gi     Anxiety      Chronic insomnia     on ambien and klonopin for over 10 years together     Depression, major, in partial remission (H)     was seeing psyche until , not since then     Fibromyalgia      Hypothyroid      Interstitial cystitis     urol [place] and had interstem     Migraines     Dr. Cash of neuro, got botox  via [facility] Dr. Kevin, now Dr. Watkins     Restless leg syndrome      Urgency of urination     interstim     Past Surgical History:   Procedure Laterality Date     interstim procedure  ,  and     [facility]       Anesthesia Evaluation     . Pt has had prior anesthetic. Type: General           ROS/MED HX    ENT/Pulmonary:      (-) sleep apnea   Neurologic: Comment: RLS    (+)migraines,     Cardiovascular:        (-) hypertension and dyslipidemia   METS/Exercise Tolerance:     Hematologic:         Musculoskeletal: Comment: Fibromyalgia        GI/Hepatic:        (-) GERD   Renal/Genitourinary:         Endo:     (+) thyroid problem hypothyroidism, .   (-) Type II DM   Psychiatric:     (+) psychiatric history depression and anxiety      Infectious Disease:         Malignancy:         Other:                          Physical Exam  Normal systems: dental    Airway   Mallampati: II  TM distance: >3 FB  Neck ROM: full    Dental     Cardiovascular   Rhythm and rate: regular      Pulmonary    breath sounds clear to auscultation            Lab Results   Component Value Date    WBC 4.3 2020    HGB 13.2 07/15/2020    HCT 43.1 2020     2020      "09/09/2019    POTASSIUM 4.0 09/09/2019    CHLORIDE 108 09/09/2019    CO2 27 09/09/2019    BUN 16 09/09/2019    CR 0.90 09/09/2019    GLC 85 09/09/2019    RAIMUNDO 8.6 09/09/2019    ALBUMIN 3.8 05/22/2020    PROTTOTAL 7.1 05/22/2020    ALT 33 05/22/2020    AST 24 05/22/2020    ALKPHOS 73 05/22/2020    BILITOTAL 0.5 05/22/2020    TSH 2.64 05/22/2020    T4 1.07 04/02/2018       Preop Vitals  BP Readings from Last 3 Encounters:   07/15/20 108/76   09/09/19 103/73   04/23/19 92/70    Pulse Readings from Last 3 Encounters:   07/15/20 71   09/09/19 77   04/23/19 72      Resp Readings from Last 3 Encounters:   02/05/18 18   03/12/13 16    SpO2 Readings from Last 3 Encounters:   07/15/20 99%   09/09/19 97%   09/17/18 98%      Temp Readings from Last 1 Encounters:   07/15/20 36.6  C (97.8  F) (Temporal)    Ht Readings from Last 1 Encounters:   07/15/20 1.727 m (5' 8\")      Wt Readings from Last 1 Encounters:   07/15/20 81.2 kg (179 lb)    Estimated body mass index is 27.22 kg/m  as calculated from the following:    Height as of 7/15/20: 1.727 m (5' 8\").    Weight as of 7/15/20: 81.2 kg (179 lb).       Anesthesia Plan      History & Physical Review  History and physical reviewed and following examination; no interval change.    ASA Status:  2 .    NPO Status:  > 8 hours    Plan for General with Intravenous induction. Maintenance will be Balanced.    PONV prophylaxis:  Ondansetron (or other 5HT-3)    The patient is not a current smoker      Postoperative Care  Postoperative pain management:  Multi-modal analgesia.      Consents  Anesthetic plan, risks, benefits and alternatives discussed with:  Patient.  Use of blood products discussed: No .   .                 Travis Koch MD  "

## 2020-07-20 NOTE — ANESTHESIA POSTPROCEDURE EVALUATION
Patient: Vero Lua    Procedure(s):  DIAGNOSTIC ENDOSCOPIC ULTRASOUND, ESOPHAGOSCOPY / UPPER GASTROINTESTINAL TRACT    Diagnosis:Abnormal finding on GI tract imaging [R93.3]  Diagnosis Additional Information: No value filed.    Anesthesia Type:  General    Note:  Anesthesia Post Evaluation    Patient location during evaluation: PACU  Patient participation: Able to fully participate in evaluation  Level of consciousness: awake and alert  Pain management: adequate  Airway patency: patent  Cardiovascular status: acceptable and stable  Respiratory status: acceptable, spontaneous ventilation, unassisted and nonlabored ventilation  Hydration status: acceptable  PONV: none             Last vitals:  Vitals:    07/20/20 1115 07/20/20 1130 07/20/20 1145   BP: 112/74 117/79 113/71   Pulse: 65 66 61   Resp: 10 12 12   Temp:      SpO2: 100% 100% 100%         Electronically Signed By: Travis Koch MD  July 20, 2020  12:07 PM

## 2020-07-20 NOTE — DISCHARGE INSTRUCTIONS
Same Day Surgery Discharge Instructions for  Sedation and General Anesthesia       It's not unusual to feel dizzy, light-headed or faint for up to 24 hours after surgery or while taking pain medication.  If you have these symptoms: sit for a few minutes before standing and have someone assist you when you get up to walk or use the bathroom.      You should rest and relax for the next 24 hours. We recommend you make arrangements to have an adult stay with you for at least 24 hours after your discharge.  Avoid hazardous and strenuous activity.      DO NOT DRIVE any vehicle or operate mechanical equipment for 24 hours following the end of your surgery.  Even though you may feel normal, your reactions may be affected by the medication you have received.      Do not drink alcoholic beverages for 24 hours following surgery.       Slowly progress to your regular diet as you feel able. It's not unusual to feel nauseated and/or vomit after receiving anesthesia.  If you develop these symptoms, drink clear liquids (apple juice, ginger ale, broth, 7-up, etc. ) until you feel better.  If your nausea and vomiting persists for 24 hours, please notify your surgeon.        All narcotic pain medications, along with inactivity and anesthesia, can cause constipation. Drinking plenty of liquids and increasing fiber intake will help.      For any questions of a medical nature, call your surgeon.      Do not make important decisions for 24 hours.      If you had general anesthesia, you may have a sore throat for a couple of days related to the breathing tube used during surgery.  You may use Cepacol lozenges to help with this discomfort.  If it worsens or if you develop a fever, contact your surgeon.       If you feel your pain is not well managed with the pain medications prescribed by your surgeon, please contact your surgeon's office to let them know so they can address your concerns.       CoVid 19 Information    We want to give you  information regarding Covid. Please consult your primary care provider with any questions you might have.     Patient who have symptoms (cough, fever, or shortness of breath), need to isolate for 7 days from when symptoms started OR 72 hours after fever resolves (without fever reducing medications) AND improvement of respiratory symptoms (whichever is longer).      Isolate yourself at home (in own room/own bathroom if possible)    Do Not allow any visitors    Do Not go to work or school    Do Not go to Sikh,  centers, shopping, or other public places.    Do Not shake hands.    Avoid close and intimate contact with others (hugging, kissing).    Follow CDC recommendations for household cleaning of frequently touched services.     After the initial 7 days, continue to isolate yourself from household members as much as possible. To continue decrease the risk of community spread and exposure, you and any members of your household should limit activities in public for 14 days after starting home isolation.     You can reference the following CDC link for helpful home isolation/care tips:  https://www.cdc.gov/coronavirus/2019-ncov/downloads/10Things.pdf    Protect Others:    Cover Your Mouth and Nose with a mask, disposable tissue or wash cloth to avoid spreading germs to others.    Wash your hands and face frequently with soap and water    Call Your Primary Doctor If: Breathing difficulty develops or you become worse.    For more information about COVID19 and options for caring for yourself at home, please visit the CDC website at https://www.cdc.gov/coronavirus/2019-ncov/about/steps-when-sick.html  For more options for care at [facility], please visit our website at [email or web address]      Reasons to contact your surgeon:    1. Elevated temperature.  2. Pain not controlled with pain medication and/or rest.   3. Uncontrolled nausea or vomiting.  4. Any questions or  concerns.          *****See report from endoscopy****      **If you have questions or concerns about your procedure,   call Dr. Hurst at [phone]**

## 2020-07-27 ENCOUNTER — PATIENT OUTREACH (OUTPATIENT)
Dept: GASTROENTEROLOGY | Facility: CLINIC | Age: 55
End: 2020-07-27

## 2020-07-27 DIAGNOSIS — R10.11 RUQ ABDOMINAL PAIN: ICD-10-CM

## 2020-07-27 DIAGNOSIS — R93.3 ABNORMAL FINDING ON GI TRACT IMAGING: Primary | ICD-10-CM

## 2020-07-27 NOTE — TELEPHONE ENCOUNTER
Post EUS (7/20/2020) with Dr. Rivas: Follow-up    Post procedure recommendations:   HIDA scan as our next step in evaluation RUQ                             pain; if negative we will need to consider                             therapies for irritable bowel                             - MRI/MRCP in one year for follow up of small                             cystic lesion (as explained above) and for                             surveillance     Orders placed: HIDA and MRCP    Patient states left message. [employer] message sent    Clinic contact and scheduling numbers verified for future questions/concerns.    Mervat Escalante RN Care Coordinator

## 2020-08-13 ENCOUNTER — HOSPITAL ENCOUNTER (OUTPATIENT)
Dept: NUCLEAR MEDICINE | Facility: CLINIC | Age: 55
Setting detail: NUCLEAR MEDICINE
Discharge: HOME | End: 2020-08-13
Attending: INTERNAL MEDICINE | Admitting: INTERNAL MEDICINE
Payer: COMMERCIAL

## 2020-08-13 DIAGNOSIS — R10.11 RUQ ABDOMINAL PAIN: ICD-10-CM

## 2020-08-13 PROCEDURE — 78227 HEPATOBIL SYST IMAGE W/DRUG: CPT

## 2020-08-13 PROCEDURE — A9537 TC99M MEBROFENIN: HCPCS | Performed by: INTERNAL MEDICINE

## 2020-08-13 PROCEDURE — 34300033 ZZH RX 343: Performed by: INTERNAL MEDICINE

## 2020-08-13 PROCEDURE — 25000128 H RX IP 250 OP 636: Performed by: INTERNAL MEDICINE

## 2020-08-13 RX ORDER — KIT FOR THE PREPARATION OF TECHNETIUM TC 99M MEBROFENIN 45 MG/10ML
5 INJECTION, POWDER, LYOPHILIZED, FOR SOLUTION INTRAVENOUS ONCE
Status: COMPLETED | OUTPATIENT
Start: 2020-08-13 | End: 2020-08-13

## 2020-08-13 RX ADMIN — SINCALIDE 1.6 MCG: 5 INJECTION, POWDER, LYOPHILIZED, FOR SOLUTION INTRAVENOUS at 10:40

## 2020-08-13 RX ADMIN — MEBROFENIN 6.6 MILLICURIE: 45 INJECTION, POWDER, LYOPHILIZED, FOR SOLUTION INTRAVENOUS at 09:36

## 2020-09-04 ENCOUNTER — TELEPHONE (OUTPATIENT)
Dept: GASTROENTEROLOGY | Facility: CLINIC | Age: 55
End: 2020-09-04

## 2020-09-04 DIAGNOSIS — R93.3 ABNORMAL FINDING ON GI TRACT IMAGING: Primary | ICD-10-CM

## 2020-09-04 NOTE — TELEPHONE ENCOUNTER
[facility] Call Center    Phone Message    May a detailed message be left on voicemail: yes     Reason for Call: Requesting Results   Name/type of test: hida  Date of test: 8-13  Was test done at a location other than [facility] (Please fill in the location if not [facility])?: No      Action Taken: Message routed to:  [facility] ([facility] AE    Travel Screening: Not Applicable

## 2020-09-04 NOTE — TELEPHONE ENCOUNTER
Returned patient call to review letter from Dr. Hurst. Left message noting there was a letter for patient to review in [facility].    Referral placed to Gen surgery for cholecystectomy.     Mervat Escalante RN Care Coordinator

## 2020-09-09 ENCOUNTER — PATIENT OUTREACH (OUTPATIENT)
Dept: SURGERY | Facility: CLINIC | Age: 55
End: 2020-09-09

## 2020-09-09 NOTE — PROGRESS NOTES
A referral was placed for this patient to consult with the General Surgery Team regarding gallbladder concerns.  Attempted to reach patient with no answer. LM on VM to call scheduling line.

## 2020-09-11 ENCOUNTER — TELEPHONE (OUTPATIENT)
Dept: GASTROENTEROLOGY | Facility: CLINIC | Age: 55
End: 2020-09-11

## 2020-09-22 ENCOUNTER — PATIENT OUTREACH (OUTPATIENT)
Dept: SURGERY | Facility: CLINIC | Age: 55
End: 2020-09-22

## 2020-09-22 NOTE — PROGRESS NOTES
A referral was placed for this patient to consult with the General Surgery Team.  Attempted to reach patient with no answer. LM on VM to call scheduling line x 2.

## 2020-10-02 ENCOUNTER — TELEPHONE (OUTPATIENT)
Dept: GASTROENTEROLOGY | Facility: CLINIC | Age: 55
End: 2020-10-02

## 2020-10-06 DIAGNOSIS — E03.9 HYPOTHYROIDISM, UNSPECIFIED TYPE: ICD-10-CM

## 2020-10-06 RX ORDER — LEVOTHYROXINE SODIUM 100 UG/1
100 TABLET ORAL DAILY
Qty: 90 TABLET | Refills: 1 | Status: SHIPPED | OUTPATIENT
Start: 2020-10-06 | End: 2021-02-26

## 2020-10-06 NOTE — TELEPHONE ENCOUNTER
Prescription approved per [facility].  Marlene ARREDONDO RN

## 2020-10-06 NOTE — TELEPHONE ENCOUNTER
Last labs 5/2020  LOV 7-15-20 Krz[facility]ztof, 5-21-20 [facility]  No future OV scheduled    Alisa Pollock, RT (R)

## 2020-10-09 NOTE — TELEPHONE ENCOUNTER
REFERRAL INFORMATION:    Referring Provider:  Dr. Hurst     Referring Clinic:  [facility]     Reason for Visit/Diagnosis: Abnormal finding on GI tract imaging, Discuss gallbladder surgery        FUTURE VISIT INFORMATION:    Appointment Date: 10/22/2020    Appointment Time: 2 PM      NOTES RECORD STATUS  DETAILS   OFFICE NOTE from Referring Provider Internal 9/4/2020 Office visit with Dr. Hurst    OFFICE NOTE from Other Specialists N/A    HOSPITAL DISCHARGE SUMMARY/ ED VISITS  N/A    OPERATIVE REPORT Internal EUS: 7/20/2020   ENDOSCOPY (EGD)  N/A    PERTINENT LABS Internal    PATHOLOGY REPORTS (RELATED) N/A    IMAGING (CT, MRI, US, XR)  Internal NM Hepatobiliary Scan: 8/13/2020  US Abdomen: 6/9/2020  CT Abdomen Pelvis: 6/4/2020

## 2020-10-13 ENCOUNTER — DOCUMENTATION ONLY (OUTPATIENT)
Dept: CARE COORDINATION | Facility: CLINIC | Age: 55
End: 2020-10-13

## 2020-10-22 ENCOUNTER — PRE VISIT (OUTPATIENT)
Dept: SURGERY | Facility: CLINIC | Age: 55
End: 2020-10-22

## 2020-10-22 ENCOUNTER — VIRTUAL VISIT (OUTPATIENT)
Dept: SURGERY | Facility: CLINIC | Age: 55
End: 2020-10-22
Attending: INTERNAL MEDICINE
Payer: COMMERCIAL

## 2020-10-22 VITALS — HEIGHT: 68 IN | BODY MASS INDEX: 26.52 KG/M2 | WEIGHT: 175 LBS

## 2020-10-22 DIAGNOSIS — R93.3 ABNORMAL FINDING ON GI TRACT IMAGING: ICD-10-CM

## 2020-10-22 PROCEDURE — 99203 OFFICE O/P NEW LOW 30 MIN: CPT | Mod: 95 | Performed by: SURGERY

## 2020-10-22 ASSESSMENT — PAIN SCALES - GENERAL: PAINLEVEL: NO PAIN (0)

## 2020-10-22 ASSESSMENT — MIFFLIN-ST. JEOR: SCORE: 1437.29

## 2020-10-22 NOTE — LETTER
10/22/2020       RE: Vero Lua  [address]  [place] [address]     Dear Colleague,    Thank you for referring your patient, Vero Lua, to the [facility] [place] [facility]. Please see a copy of my visit note below.    New General Surgery Consultation Note      Vero Lua  [id number]  1965 October 22, 2020     Requesting Provider: Juan Hurst     Dear Dr Jara,     I had the pleasure of seeing your patient, Vero Lua.  She is a 55 year old female who is being seen in consultation for the following concern(s).     CHIEF COMPLAINT:  Right upper quadrant abdominal pain of 6 months' duration.    HISTORY OF PRESENT ILLNESS:  Vero developed abdominal pain and reported this to her PCP in May 2020.   She had the pain for at least a month and it was occurring off and on with some radiation to the right back.    To me today she describes the pain as coming and going and occurring on a near daily basis.  She has the pain currently.  She thinks it gets worse with eating fatty foods.    She has had CT scan of abd/pelvis, which documented 12mm common bile duct(enlarged); was further evaluated with ultrasound, EUS/EGD, and HIDA scan.    The bile duct appears to be benign enlargement; there is no ulcer.    EUS documented gallbladder sludge; on HIDA scan she also has ejection fraction of the gallbladder at the lower side of normal range; however, the CCK administration during the HIDA scan didn't exacerbate the pain.    Dr. Hurst has referred for cholecystectomy.  I discussed this with Vero.  There is no easy way to show that gallbladder removal will resolve her condition short of removing it.  She has been suffering quite a bit on a daily basis and there is no further evaluation required at this point.  Part of the diagnostic algorithm for the right upper quadrant abdominal pain now includes gallbladder  removal.    There is some rationale for this given that he pain syndrome does somewhat mimic gallbladder symptomatology; moreover, she has sludge which can cause biliary colic.    I don't think there is a rush to remove the gallbladder and this can be scheduled electively as outpatient surgery.    ROS    PAST MEDICAL HISTORY:  Past Medical History:   Diagnosis Date     Adenomatous polyp of colon 5/16    fu 5 years, [place]     Anxiety      Chronic insomnia     on ambien and klonopin for over 10 years together     Depression, major, in partial remission (H)     was seeing psyche until 2015, not since then     Fibromyalgia 1998     Hypothyroid 90's     Interstitial cystitis     urol Refugio and had interstem     Migraines     Dr. Cash of neuro, got botox 2012 via [facility] Dr. Kevin, now Dr. Watkins     Restless leg syndrome      Urgency of urination     interstim         PAST SURGICAL HISTORY:  Past Surgical History:   Procedure Laterality Date     COLONOSCOPY       ENDOSCOPIC ULTRASOUND UPPER GASTROINTESTINAL TRACT (GI) N/A 7/20/2020    Procedure: DIAGNOSTIC ENDOSCOPIC ULTRASOUND, ESOPHAGOSCOPY / UPPER GASTROINTESTINAL TRACT;  Surgeon: Juan Hurst MD;  Location: [facility] OR     interstim procedure  2015, 2010 and 2008    [facility]     MEDICATIONS:  Current Outpatient Medications   Medication     BOTOX 200 units injection     buPROPion (WELLBUTRIN XL) 150 MG 24 hr tablet     clonazePAM (KLONOPIN) 1 MG tablet     levothyroxine (SYNTHROID/LEVOTHROID) 100 MCG tablet     topiramate (TOPAMAX) 200 MG tablet     vortioxetine (BRINTELLIX) 10 MG tablet     zolpidem (AMBIEN) 5 MG tablet     loratadine (CLARITIN) 10 MG tablet     rizatriptan (MAXALT) 10 MG tablet     No current facility-administered medications for this visit.      ALLERGIES:  Allergies   Allergen Reactions     Adhesive Tape Rash     Some tapes and surgical glue     SOCIAL HISTORY:  Social History     Socioeconomic History     Marital status:       "Spouse name: None     Number of children: 1     Years of education: None     Highest education level: None   Occupational History     Occupation: homemaker     Employer: NONE    Social Needs     Financial resource strain: None     Food insecurity     Worry: None     Inability: None     Transportation needs     Medical: None     Non-medical: None   Tobacco Use     Smoking status: Never Smoker     Smokeless tobacco: Never Used   Substance and Sexual Activity     Alcohol use: No     Alcohol/week: 0.0 standard drinks     Drug use: No     Sexual activity: Yes     Partners: Male     Birth control/protection: Post-menopausal   Lifestyle     Physical activity     Days per week: None     Minutes per session: None     Stress: None   Relationships     Social connections     Talks on phone: None     Gets together: None     Attends Baptism service: None     Active member of club or organization: None     Attends meetings of clubs or organizations: None     Relationship status: None     Intimate partner violence     Fear of current or ex partner: None     Emotionally abused: None     Physically abused: None     Forced sexual activity: None   Other Topics Concern     Parent/sibling w/ CABG, MI or angioplasty before 65F 55M? Not Asked   Social History Narrative     None     FAMILY HISTORY:  Family History   Problem Relation Age of Onset     Mental Illness Mother      Depression Sister      Depression Father      Osteoporosis Maternal Grandmother      PHYSICAL EXAM:  Vital Signs: Ht 1.727 m (5' 8\")   Wt 79.4 kg (175 lb)   LMP 06/27/2012   BMI 26.61 kg/m    HEENT: NCAT; MMM;   Lungs: Breathing unlabored  Deferred due to virtual visit.     PERTINENT IMAGING/TESTING:  Imaging pasted below.    LABORATORY TESTING:  CMP normal; TB 0.5; DB 0.1.  Wbc normal.    ASSESSMENT:   Vero Lua is a 55 year old female with strong family history of liver/pancreas cancer who has had right upper quadrant abdominal pain.    Findings on " imaging/endoscopy document the followin. 12mm dilated common bile duct.  2. EUS shows sludge in gallbladder.  3. HIDA documents low EF, 34%; this is on the low side of the normal range.     DISCUSSION OF RISKS:  I reviewed the risks of surgery with Vero Lua.    These include, but are not limited to, death, myocardial infarction, pneumonia, urinary tract infection, deep venous thrombosis with or without pulmonary embolus, abdominal infection from bowel injury or abscess, bowel obstruction, wound infection, and bleeding.    More specific risks related to laparoscopic cholecystectomy include bile duct injury (3/1000), bile leak (10/1000), retained common bile duct stone (10/1000), postcholecystectomy diarrhea (1-2%) and these complications may require additional treatment.    PLAN:   Laparoscopic cholecystectomy.  Same day surgery.  PAC for preop or primary care provider.    Call Markell at [phone] for scheduling.    No orders of the defined types were placed in this encounter.    Sincerely,     William Leslie MD     20 10:18 AM [id number] [facility]    PACS Images     Show images for US Abdomen Complete   Study Result    US ABDOMEN COMPLETE 2020 10:18 AM     CLINICAL HISTORY: Dilation of common bile duct     TECHNIQUE: Complete abdominal ultrasound.     COMPARISON: CT dated 2020     FINDINGS:     GALLBLADDER: Normal. No gallstones, wall thickening, or  pericholecystic fluid. Negative sonographic Jameson's sign.     BILE DUCTS: There is extrahepatic bile duct dilatation. The common  duct measures 12 mm.     LIVER: Normal parenchyma with smooth contour. No focal mass.     RIGHT KIDNEY: Normal size. Normal echogenicity with no hydronephrosis  or mass.      LEFT KIDNEY: Normal size. Normal echogenicity with no hydronephrosis  or mass.      SPLEEN: Normal.     PANCREAS: The visualized portions are normal.     AORTA: Normal in caliber.      IVC: Normal where  visualized.     No ascites.    IMPRESSION:  1.  Extrahepatic bile duct dilatation. No stones identified in the  visualized portions of the bile ducts. No cholelithiasis.  2.  MRCP could be considered.     LESTER J FAHRNER, MD     6/4/20 12:52 PM [id number] [facility] [place]    PACS Images     Show images for CT Abdomen Pelvis w Contrast   Study Result    CT ABDOMEN AND PELVIS WITH CONTRAST   6/4/2020 12:52 PM      HISTORY: Right-sided abdominal pain radiating to the back.     TECHNIQUE: CT of the abdomen and pelvis was performed following the  administration of 90 mL Isovue-370. Radiation dose for this scan was  reduced using automated exposure control, adjustment of the mA and/or  kV according to patient size, or iterative reconstruction technique.     COMPARISON: None.     FINDINGS: The solid organs in the abdomen appear unremarkable.     The bowel appears grossly unremarkable. The extrahepatic common bile  duct is dilated with a maximum diameter of approximately 12 mm. There  is no significant intrahepatic biliary duct dilatation.     There is minimal bibasilar atelectasis.    IMPRESSION: Dilated extrahepatic common bile duct. Etiology unclear on  CT. If indicated, further imaging evaluation could be performed with  an MRCP.     WILFRED DUNN MD     Exam Information    Exam Date Exam Time Accession # Performing Department Results    8/13/20 11:32 AM [id number] [facility] [place]    PACS Images     Show images for NM Hepatobiliary Scan w GB EF   Study Result    NM HEPATOBILIARY SCAN WITH GB EF  8/13/2020 11:32 AM     INDICATION: Right upper quadrant abdominal pain  TECHNIQUE: Following the administration of 6.6 mCi of Tc-99m  mebrofenin intravenously, anterior planar imaging of the abdomen was  obtained for 60 minutes. 1.6 mcg of cholecystokinin analogue were then  administered intravenously, and the gallbladder was imaged for an  additional 30 minutes to assess  ejection fraction.  COMPARISON: None.     FINDINGS: Prompt uptake and excretion of the radiotracer by the liver.  Gallbladder and bowel activity confirm patency of the cystic and  common bile ducts. Following injection of CCK, the gallbladder  ejection fraction is 34%, near the lower limit of normal.     CONCLUSION:  1.  Patent common bile duct and cystic duct.  2.  Gallbladder ejection fraction at the lower limits of normal.     JOSE MARIA AGEE MD     Findings:        White light and endosonographic findings :        A curved linear was utilized throughout. Limited oblique white light        imaging excluded inflammation or ulcerations of the foregut. Tangential        views of the ampulla were without lesion. In terms of endosonography,        there was no evidence of concerning pancreatic, ampullary, biliary or        duodenal solid lesions. The gallbladder is in situ and contained mobile,        layering hyperechoic content (sludge) without wall thickening. The        common duct was traced from the bifurcation to the ampulla and was found        to be top normal caliber at 8.8mm with smooth taper to less than 3mm        within the ampulla. There was no intraductal asymmetric wall thickening        or internal solid component. A small, 4mm anechoic simple cyst was found        in the uncinate, though no other liquid or solid pancreatic lesions were        demonstrated within a diffusely homogeneous parenchyma that was without        foci, strands or lobularity. The main duct was decompressed throuhgout,        measurnig just under 3mm at the head with smooth taper to less than 1mm        in the body with further taper upsream. The duct was traditional and        ventral dominant without significant irregularity. There was no evidence        of peripancreatic, periportal, perigastric or celiac lymphadenopathy.        The major vasculature of the abdomen including the celiac trunk,        splenics, superior  mesenterics, gastroduodenal and portal appeared        traditional and unremarkable. Limited views of the left lobe, right lobe        caudate, left adrenal and spleen were without concerning lesion, though        the spleen appeared generous in size.                                                                                     Impression:               - No indication for tandem ERCP; we suspect the                             dilated common duct seen on imaging included the                             caliber of the cystic duct at the site of insertion                             - Gallbladder sludge without overt evidence of                             cholecystitis                             - No evidence of choledochlithiasis                             - No evidence of pancreaticobiliary or duodenal                             lesions save for a small (3mm) cystic lesion of the                             head of the pancreas; this may represent a side                             branched intraductal papillary mucinous neoplasm                             and therefore deserves noninvasive MRI/MRCP in one                             year                             - No evidence of lymphadenopathy or vascular                             abnormality                             - Grossly healthy appearing pancreatic parenchyma                             and duct   Recommendation:           - General anesthesia recovery with probable                             discharge home this morning/afternoon                             - HIDA scan as our next step in evaluation RUQ                             pain; if negative we will need to consider                             therapies for irritable bowel                             - MRI/MRCP in one year for follow up of small                             cystic lesion (as explained above) and for                             surveillance                              - The findings and recommendations were discussed                             with the patient and their family                                                                       Again, thank you for allowing me to participate in the care of your patient.  Sincerely,    William Leslie MD

## 2020-10-22 NOTE — NURSING NOTE
"Chief Complaint   Patient presents with     Consult     New appointment.       Vitals:    10/22/20 1323   Weight: 79.4 kg (175 lb)   Height: 1.727 m (5' 8\")       Body mass index is 26.61 kg/m .                            ARIN MAJANO, EMT    "

## 2020-10-22 NOTE — PATIENT INSTRUCTIONS
"It was a pleasure meeting with you today.    Thank you for allowing us the privilege of caring for you. We hope we provided you with the excellent service you deserve.   Please let us know if there is anything else we can do for you so that we can be sure you are leaving completely satisfied with your care experience.    To ensure the quality of our services you may be receiving a patient satisfaction survey from an independent patient satisfaction monitoring company.    The greatest compliment you can give is a \"Likely to Recommend\"      Your visit was with William Leslie MD today.     Instructions per today's visit:     We discussed the potential for laparoscopic cholecystectomy (gallbladder removal).    This would be done as outpatient at [address] [place] [address] at [facility] [place].    Call Markell at [phone] for scheduling.    Would probably occur in January or beyond.      To schedule appointments with our team, please call [phone] option #1    Please call during clinic hours Monday through Friday 8:00a - 4:00p if you have questions or you can contact us via [email or web address] at anytime.      Nurses: [phone]  Fax: [phone]   "

## 2020-10-22 NOTE — PROGRESS NOTES
"Vero Lua is a 55 year old female who is being evaluated via a billable video visit.      The patient has been notified of following:     \"This video visit will be conducted via a call between you and your physician/provider. We have found that certain health care needs can be provided without the need for an in-person physical exam.  This service lets us provide the care you need with a video conversation.  If a prescription is necessary we can send it directly to your pharmacy.  If lab work is needed we can place an order for that and you can then stop by our lab to have the test done at a later time.    Video visits are billed at different rates depending on your insurance coverage.  Please reach out to your insurance provider with any questions.    If during the course of the call the physician/provider feels a video visit is not appropriate, you will not be charged for this service.\"    Patient has given verbal consent for Video visit? Yes  How would you like to obtain your AVS? MyChart  If you are dropped from the video visit, the video invite should be resent to: Text to cell phone: [phone]  Will anyone else be joining your video visit? No    During this virtual visit the patient is located in [place], patient verifies this as the location during the entirety of this visit.     Video-Visit Details    Type of service:  Video Visit    Video Start Time: 1:55 PM  Video End Time: 2:10 PM    Originating Location (pt. Location): [place]    Distant Location (provider location):  [facility] GENERAL SURGERY [facility]     Platform used for Video Visit: Sharon Leslie MD              [facility] General Surgery Consultation Note        Vero Lua  [id number]  1965 October 22, 2020     Requesting Provider: Juan Hurst     Dear Dr Jara,     I had the pleasure of seeing your patient, Vero Lua.  She is a 55 year old female who is being seen in consultation for the following " concern(s).     CHIEF COMPLAINT:  Right upper quadrant abdominal pain of 6 months' duration.    HISTORY OF PRESENT ILLNESS:  Vero developed abdominal pain and reported this to her PCP in May 2020.   She had the pain for at least a month and it was occurring off and on with some radiation to the right back.    To me today she describes the pain as coming and going and occurring on a near daily basis.  She has the pain currently.  She thinks it gets worse with eating fatty foods.    She has had CT scan of abd/pelvis, which documented 12mm common bile duct(enlarged); was further evaluated with ultrasound, EUS/EGD, and HIDA scan.    The bile duct appears to be benign enlargement; there is no ulcer.    EUS documented gallbladder sludge; on HIDA scan she also has ejection fraction of the gallbladder at the lower side of normal range; however, the CCK administration during the HIDA scan didn't exacerbate the pain.    Dr. Hurst has referred for cholecystectomy.  I discussed this with Vero.  There is no easy way to show that gallbladder removal will resolve her condition short of removing it.  She has been suffering quite a bit on a daily basis and there is no further evaluation required at this point.  Part of the diagnostic algorithm for the right upper quadrant abdominal pain now includes gallbladder removal.    There is some rationale for this given that he pain syndrome does somewhat mimic gallbladder symptomatology; moreover, she has sludge which can cause biliary colic.    I don't think there is a rush to remove the gallbladder and this can be scheduled electively as outpatient surgery.    ROS    PAST MEDICAL HISTORY:  Past Medical History:   Diagnosis Date     Adenomatous polyp of colon 5/16    fu 5 years, [place]     Anxiety      Chronic insomnia     on ambien and klonopin for over 10 years together     Depression, major, in partial remission (H)     was seeing psyche until 2015, not since then     Fibromyalgia 1998      Hypothyroid 90's     Interstitial cystitis     urol [place] and had interstem     Migraines     Dr. Cash of neuro, got botox 2012 via [facility] Dr. Kevin, now Dr. Watkins     Restless leg syndrome      Urgency of urination     interstim        PAST SURGICAL HISTORY:  Past Surgical History:   Procedure Laterality Date     COLONOSCOPY       ENDOSCOPIC ULTRASOUND UPPER GASTROINTESTINAL TRACT (GI) N/A 7/20/2020    Procedure: DIAGNOSTIC ENDOSCOPIC ULTRASOUND, ESOPHAGOSCOPY / UPPER GASTROINTESTINAL TRACT;  Surgeon: Juan Hurst MD;  Location: [facility] OR     interstim procedure  2015, 2010 and 2008    [facility]        MEDICATIONS:  Current Outpatient Medications   Medication     BOTOX 200 units injection     buPROPion (WELLBUTRIN XL) 150 MG 24 hr tablet     clonazePAM (KLONOPIN) 1 MG tablet     levothyroxine (SYNTHROID/LEVOTHROID) 100 MCG tablet     topiramate (TOPAMAX) 200 MG tablet     vortioxetine (BRINTELLIX) 10 MG tablet     zolpidem (AMBIEN) 5 MG tablet     loratadine (CLARITIN) 10 MG tablet     rizatriptan (MAXALT) 10 MG tablet     No current facility-administered medications for this visit.         ALLERGIES:  Allergies   Allergen Reactions     Adhesive Tape Rash     Some tapes and surgical glue        SOCIAL HISTORY:  Social History     Socioeconomic History     Marital status:      Spouse name: None     Number of children: 1     Years of education: None     Highest education level: None   Occupational History     Occupation: homemaker     Employer: NONE    Social Needs     Financial resource strain: None     Food insecurity     Worry: None     Inability: None     Transportation needs     Medical: None     Non-medical: None   Tobacco Use     Smoking status: Never Smoker     Smokeless tobacco: Never Used   Substance and Sexual Activity     Alcohol use: No     Alcohol/week: 0.0 standard drinks     Drug use: No     Sexual activity: Yes     Partners: Male     Birth control/protection: Post-menopausal  "  Lifestyle     Physical activity     Days per week: None     Minutes per session: None     Stress: None   Relationships     Social connections     Talks on phone: None     Gets together: None     Attends [employer] service: None     Active member of club or organization: None     Attends meetings of clubs or organizations: None     Relationship status: None     Intimate partner violence     Fear of current or ex partner: None     Emotionally abused: None     Physically abused: None     Forced sexual activity: None   Other Topics Concern     Parent/sibling w/ CABG, MI or angioplasty before 65F 55M? Not Asked   Social History Narrative     None       FAMILY HISTORY:  Family History   Problem Relation Age of Onset     Mental Illness Mother      Depression Sister      Depression Father      Osteoporosis Maternal Grandmother         PHYSICAL EXAM:  Vital Signs: Ht 1.727 m (5' 8\")   Wt 79.4 kg (175 lb)   LMP 2012   BMI 26.61 kg/m    HEENT: NCAT; MMM;   Lungs: Breathing unlabored  Deferred due to virtual visit.     PERTINENT IMAGING/TESTING:  Imaging pasted below.    LABORATORY TESTING:  CMP normal; TB 0.5; DB 0.1.  Wbc normal.    ASSESSMENT:   Vero Lua is a 55 year old female with strong family history of liver/pancreas cancer who has had right upper quadrant abdominal pain.    Findings on imaging/endoscopy document the followin. 12mm dilated common bile duct.  2. EUS shows sludge in gallbladder.  3. HIDA documents low EF, 34%; this is on the low side of the normal range.     DISCUSSION OF RISKS:  I reviewed the risks of surgery with Vero Lua.    These include, but are not limited to, death, myocardial infarction, pneumonia, urinary tract infection, deep venous thrombosis with or without pulmonary embolus, abdominal infection from bowel injury or abscess, bowel obstruction, wound infection, and bleeding.    More specific risks related to laparoscopic cholecystectomy include bile duct injury " (3/1000), bile leak (10/1000), retained common bile duct stone (10/1000), postcholecystectomy diarrhea (1-2%) and these complications may require additional treatment.    PLAN:   Laparoscopic cholecystectomy.  Same day surgery.  PAC for preop or primary care provider.    Call [facility] at [phone] for scheduling.      No orders of the defined types were placed in this encounter.      Sincerely,     William Leslie MD           6/9/20 10:18 AM [id number] [facility]    PACS Images     Show images for US Abdomen Complete   Study Result    US ABDOMEN COMPLETE 6/9/2020 10:18 AM     CLINICAL HISTORY: Dilation of common bile duct     TECHNIQUE: Complete abdominal ultrasound.     COMPARISON: CT dated 6/4/2020     FINDINGS:     GALLBLADDER: Normal. No gallstones, wall thickening, or  pericholecystic fluid. Negative sonographic Jameson's sign.     BILE DUCTS: There is extrahepatic bile duct dilatation. The common  duct measures 12 mm.     LIVER: Normal parenchyma with smooth contour. No focal mass.     RIGHT KIDNEY: Normal size. Normal echogenicity with no hydronephrosis  or mass.      LEFT KIDNEY: Normal size. Normal echogenicity with no hydronephrosis  or mass.      SPLEEN: Normal.     PANCREAS: The visualized portions are normal.     AORTA: Normal in caliber.      IVC: Normal where visualized.     No ascites.                                                                      IMPRESSION:  1.  Extrahepatic bile duct dilatation. No stones identified in the  visualized portions of the bile ducts. No cholelithiasis.  2.  MRCP could be considered.     LESTER J FAHRNER, MD     6/4/20 12:52 PM [id number] [facility]    PACS Images     Show images for CT Abdomen Pelvis w Contrast   Study Result    CT ABDOMEN AND PELVIS WITH CONTRAST   6/4/2020 12:52 PM      HISTORY: Right-sided abdominal pain radiating to the back.     TECHNIQUE: CT of the abdomen and pelvis was performed following  the  administration of 90 mL Isovue-370. Radiation dose for this scan was  reduced using automated exposure control, adjustment of the mA and/or  kV according to patient size, or iterative reconstruction technique.     COMPARISON: None.     FINDINGS: The solid organs in the abdomen appear unremarkable.     The bowel appears grossly unremarkable. The extrahepatic common bile  duct is dilated with a maximum diameter of approximately 12 mm. There  is no significant intrahepatic biliary duct dilatation.     There is minimal bibasilar atelectasis.                                                                      IMPRESSION: Dilated extrahepatic common bile duct. Etiology unclear on  CT. If indicated, further imaging evaluation could be performed with  an MRCP.     WILFRED DUNN MD     Exam Information    Exam Date Exam Time Accession # Performing Department Results    8/13/20 11:32 AM [id number] [facility]    PACS Images     Show images for NM Hepatobiliary Scan w GB EF   Study Result    NM HEPATOBILIARY SCAN WITH GB EF  8/13/2020 11:32 AM     INDICATION: Right upper quadrant abdominal pain  TECHNIQUE: Following the administration of 6.6 mCi of Tc-99m  mebrofenin intravenously, anterior planar imaging of the abdomen was  obtained for 60 minutes. 1.6 mcg of cholecystokinin analogue were then  administered intravenously, and the gallbladder was imaged for an  additional 30 minutes to assess ejection fraction.  COMPARISON: None.     FINDINGS: Prompt uptake and excretion of the radiotracer by the liver.  Gallbladder and bowel activity confirm patency of the cystic and  common bile ducts. Following injection of CCK, the gallbladder  ejection fraction is 34%, near the lower limit of normal.     CONCLUSION:  1.  Patent common bile duct and cystic duct.  2.  Gallbladder ejection fraction at the lower limits of normal.     JOSE MARIA AGEE MD     Findings:        White light and  endosonographic findings :        A curved linear was utilized throughout. Limited oblique white light        imaging excluded inflammation or ulcerations of the foregut. Tangential        views of the ampulla were without lesion. In terms of endosonography,        there was no evidence of concerning pancreatic, ampullary, biliary or        duodenal solid lesions. The gallbladder is in situ and contained mobile,        layering hyperechoic content (sludge) without wall thickening. The        common duct was traced from the bifurcation to the ampulla and was found        to be top normal caliber at 8.8mm with smooth taper to less than 3mm        within the ampulla. There was no intraductal asymmetric wall thickening        or internal solid component. A small, 4mm anechoic simple cyst was found        in the uncinate, though no other liquid or solid pancreatic lesions were        demonstrated within a diffusely homogeneous parenchyma that was without        foci, strands or lobularity. The main duct was decompressed throuhgout,        measurnig just under 3mm at the head with smooth taper to less than 1mm        in the body with further taper upsream. The duct was traditional and        ventral dominant without significant irregularity. There was no evidence        of peripancreatic, periportal, perigastric or celiac lymphadenopathy.        The major vasculature of the abdomen including the celiac trunk,        splenics, superior mesenterics, gastroduodenal and portal appeared        traditional and unremarkable. Limited views of the left lobe, right lobe        caudate, left adrenal and spleen were without concerning lesion, though        the spleen appeared generous in size.                                                                                     Impression:               - No indication for tandem ERCP; we suspect the                             dilated common duct seen on imaging included the                              caliber of the cystic duct at the site of insertion                             - Gallbladder sludge without overt evidence of                             cholecystitis                             - No evidence of choledochlithiasis                             - No evidence of pancreaticobiliary or duodenal                             lesions save for a small (3mm) cystic lesion of the                             head of the pancreas; this may represent a side                             branched intraductal papillary mucinous neoplasm                             and therefore deserves noninvasive MRI/MRCP in one                             year                             - No evidence of lymphadenopathy or vascular                             abnormality                             - Grossly healthy appearing pancreatic parenchyma                             and duct   Recommendation:           - General anesthesia recovery with probable                             discharge home this morning/afternoon                             - HIDA scan as our next step in evaluation RUQ                             pain; if negative we will need to consider                             therapies for irritable bowel                             - MRI/MRCP in one year for follow up of small                             cystic lesion (as explained above) and for                             surveillance                             - The findings and recommendations were discussed                             with the patient and their family

## 2020-10-22 NOTE — LETTER
"10/22/2020       RE: Vero Lua  [id number]  [place] [address]     Dear Colleague,    Thank you for referring your patient, Vero Lua, to the [facility]. Please see a copy of my visit note below.    Vero Lua is a 55 year old female who is being evaluated via a billable video visit.      The patient has been notified of following:     \"This video visit will be conducted via a call between you and your physician/provider. We have found that certain health care needs can be provided without the need for an in-person physical exam.  This service lets us provide the care you need with a video conversation.  If a prescription is necessary we can send it directly to your pharmacy.  If lab work is needed we can place an order for that and you can then stop by our lab to have the test done at a later time.    Video visits are billed at different rates depending on your insurance coverage.  Please reach out to your insurance provider with any questions.    If during the course of the call the physician/provider feels a video visit is not appropriate, you will not be charged for this service.\"    Patient has given verbal consent for Video visit? Yes  How would you like to obtain your AVS? MyChart  If you are dropped from the video visit, the video invite should be resent to: Text to cell phone: [phone]  Will anyone else be joining your video visit? No    During this virtual visit the patient is located in [place], patient verifies this as the location during the entirety of this visit.     Video-Visit Details    Type of service:  Video Visit    Video Start Time: 1:55 PM  Video End Time: 2:10 PM    Originating Location (pt. Location): Home    Distant Location (provider location):  [facility]     Platform used for Video Visit: Sharon Leslie, " MD              New General Surgery Consultation Note        Vero Lua  [id number]  1965 October 22, 2020     Requesting Provider: Juan Hurst     Dear Dr Jara,     I had the pleasure of seeing your patient, Vero Lua.  She is a 55 year old female who is being seen in consultation for the following concern(s).     CHIEF COMPLAINT:  Right upper quadrant abdominal pain of 6 months' duration.    HISTORY OF PRESENT ILLNESS:  Vero developed abdominal pain and reported this to her PCP in May 2020.   She had the pain for at least a month and it was occurring off and on with some radiation to the right back.    To me today she describes the pain as coming and going and occurring on a near daily basis.  She has the pain currently.  She thinks it gets worse with eating fatty foods.    She has had CT scan of abd/pelvis, which documented 12mm common bile duct(enlarged); was further evaluated with ultrasound, EUS/EGD, and HIDA scan.    The bile duct appears to be benign enlargement; there is no ulcer.    EUS documented gallbladder sludge; on HIDA scan she also has ejection fraction of the gallbladder at the lower side of normal range; however, the CCK administration during the HIDA scan didn't exacerbate the pain.    Dr. Hurst has referred for cholecystectomy.  I discussed this with Vero.  There is no easy way to show that gallbladder removal will resolve her condition short of removing it.  She has been suffering quite a bit on a daily basis and there is no further evaluation required at this point.  Part of the diagnostic algorithm for the right upper quadrant abdominal pain now includes gallbladder removal.    There is some rationale for this given that he pain syndrome does somewhat mimic gallbladder symptomatology; moreover, she has sludge which can cause biliary colic.    I don't think there is a rush to remove the gallbladder and this can be scheduled electively as outpatient  surgery.    ROS    PAST MEDICAL HISTORY:  Past Medical History:   Diagnosis Date     Adenomatous polyp of colon 5/16    fu 5 years, [place]     Anxiety      Chronic insomnia     on ambien and klonopin for over 10 years together     Depression, major, in partial remission (H)     was seeing psyche until 2015, not since then     Fibromyalgia 1998     Hypothyroid 90's     Interstitial cystitis     urol [place] and had interstem     Migraines     Dr. Cash of neuro, got botox 2012 via [facility] Dr. Kevin, now Dr. Watkins     Restless leg syndrome      Urgency of urination     interstim        PAST SURGICAL HISTORY:  Past Surgical History:   Procedure Laterality Date     COLONOSCOPY       ENDOSCOPIC ULTRASOUND UPPER GASTROINTESTINAL TRACT (GI) N/A 7/20/2020    Procedure: DIAGNOSTIC ENDOSCOPIC ULTRASOUND, ESOPHAGOSCOPY / UPPER GASTROINTESTINAL TRACT;  Surgeon: Juan Hurst MD;  Location: [facility] OR     interstim procedure  2015, 2010 and 2008    [facility]        MEDICATIONS:  Current Outpatient Medications   Medication     BOTOX 200 units injection     buPROPion (WELLBUTRIN XL) 150 MG 24 hr tablet     clonazePAM (KLONOPIN) 1 MG tablet     levothyroxine (SYNTHROID/LEVOTHROID) 100 MCG tablet     topiramate (TOPAMAX) 200 MG tablet     vortioxetine (BRINTELLIX) 10 MG tablet     zolpidem (AMBIEN) 5 MG tablet     loratadine (CLARITIN) 10 MG tablet     rizatriptan (MAXALT) 10 MG tablet     No current facility-administered medications for this visit.         ALLERGIES:  Allergies   Allergen Reactions     Adhesive Tape Rash     Some tapes and surgical glue        SOCIAL HISTORY:  Social History     Socioeconomic History     Marital status:      Spouse name: None     Number of children: 1     Years of education: None     Highest education level: None   Occupational History     Occupation: homemaker     Employer: NONE    Social Needs     Financial resource strain: None     Food insecurity     Worry: None     Inability: None  "    Transportation needs     Medical: None     Non-medical: None   Tobacco Use     Smoking status: Never Smoker     Smokeless tobacco: Never Used   Substance and Sexual Activity     Alcohol use: No     Alcohol/week: 0.0 standard drinks     Drug use: No     Sexual activity: Yes     Partners: Male     Birth control/protection: Post-menopausal   Lifestyle     Physical activity     Days per week: None     Minutes per session: None     Stress: None   Relationships     Social connections     Talks on phone: None     Gets together: None     Attends Oriental orthodox service: None     Active member of club or organization: None     Attends meetings of clubs or organizations: None     Relationship status: None     Intimate partner violence     Fear of current or ex partner: None     Emotionally abused: None     Physically abused: None     Forced sexual activity: None   Other Topics Concern     Parent/sibling w/ CABG, MI or angioplasty before 65F 55M? Not Asked   Social History Narrative     None       FAMILY HISTORY:  Family History   Problem Relation Age of Onset     Mental Illness Mother      Depression Sister      Depression Father      Osteoporosis Maternal Grandmother         PHYSICAL EXAM:  Vital Signs: Ht 1.727 m (5' 8\")   Wt 79.4 kg (175 lb)   LMP 2012   BMI 26.61 kg/m    HEENT: NCAT; MMM;   Lungs: Breathing unlabored  Deferred due to virtual visit.     PERTINENT IMAGING/TESTING:  Imaging pasted below.    LABORATORY TESTING:  CMP normal; TB 0.5; DB 0.1.  Wbc normal.    ASSESSMENT:   Vero Lua is a 55 year old female with strong family history of liver/pancreas cancer who has had right upper quadrant abdominal pain.    Findings on imaging/endoscopy document the followin. 12mm dilated common bile duct.  2. EUS shows sludge in gallbladder.  3. HIDA documents low EF, 34%; this is on the low side of the normal range.     DISCUSSION OF RISKS:  I reviewed the risks of surgery with Vero Lua.    These " include, but are not limited to, death, myocardial infarction, pneumonia, urinary tract infection, deep venous thrombosis with or without pulmonary embolus, abdominal infection from bowel injury or abscess, bowel obstruction, wound infection, and bleeding.    More specific risks related to laparoscopic cholecystectomy include bile duct injury (3/1000), bile leak (10/1000), retained common bile duct stone (10/1000), postcholecystectomy diarrhea (1-2%) and these complications may require additional treatment.    PLAN:   Laparoscopic cholecystectomy.  Same day surgery.  PAC for preop or primary care provider.    Call Markell at [phone] for scheduling.      No orders of the defined types were placed in this encounter.      Sincerely,     William Leslie MD           6/9/20 10:18 AM [id number] [facility]    PACS Images     Show images for US Abdomen Complete   Study Result    US ABDOMEN COMPLETE 6/9/2020 10:18 AM     CLINICAL HISTORY: Dilation of common bile duct     TECHNIQUE: Complete abdominal ultrasound.     COMPARISON: CT dated 6/4/2020     FINDINGS:     GALLBLADDER: Normal. No gallstones, wall thickening, or  pericholecystic fluid. Negative sonographic Jameson's sign.     BILE DUCTS: There is extrahepatic bile duct dilatation. The common  duct measures 12 mm.     LIVER: Normal parenchyma with smooth contour. No focal mass.     RIGHT KIDNEY: Normal size. Normal echogenicity with no hydronephrosis  or mass.      LEFT KIDNEY: Normal size. Normal echogenicity with no hydronephrosis  or mass.      SPLEEN: Normal.     PANCREAS: The visualized portions are normal.     AORTA: Normal in caliber.      IVC: Normal where visualized.     No ascites.                                                                      IMPRESSION:  1.  Extrahepatic bile duct dilatation. No stones identified in the  visualized portions of the bile ducts. No cholelithiasis.  2.  MRCP could be considered.     JULIANN JUAREZ  FAHRNER, MD     6/4/20 12:52 PM [id number] [facility]    PACS Images     Show images for CT Abdomen Pelvis w Contrast   Study Result    CT ABDOMEN AND PELVIS WITH CONTRAST   6/4/2020 12:52 PM      HISTORY: Right-sided abdominal pain radiating to the back.     TECHNIQUE: CT of the abdomen and pelvis was performed following the  administration of 90 mL Isovue-370. Radiation dose for this scan was  reduced using automated exposure control, adjustment of the mA and/or  kV according to patient size, or iterative reconstruction technique.     COMPARISON: None.     FINDINGS: The solid organs in the abdomen appear unremarkable.     The bowel appears grossly unremarkable. The extrahepatic common bile  duct is dilated with a maximum diameter of approximately 12 mm. There  is no significant intrahepatic biliary duct dilatation.     There is minimal bibasilar atelectasis.                                                                      IMPRESSION: Dilated extrahepatic common bile duct. Etiology unclear on  CT. If indicated, further imaging evaluation could be performed with  an MRCP.     WILFRED DUNN MD     Exam Information    Exam Date Exam Time Accession # Performing Department Results    8/13/20 11:32 AM [id number] [facility]    PACS Images     Show images for NM Hepatobiliary Scan w GB EF   Study Result    NM HEPATOBILIARY SCAN WITH GB EF  8/13/2020 11:32 AM     INDICATION: Right upper quadrant abdominal pain  TECHNIQUE: Following the administration of 6.6 mCi of Tc-99m  mebrofenin intravenously, anterior planar imaging of the abdomen was  obtained for 60 minutes. 1.6 mcg of cholecystokinin analogue were then  administered intravenously, and the gallbladder was imaged for an  additional 30 minutes to assess ejection fraction.  COMPARISON: None.     FINDINGS: Prompt uptake and excretion of the radiotracer by the liver.  Gallbladder and bowel activity confirm  patency of the cystic and  common bile ducts. Following injection of CCK, the gallbladder  ejection fraction is 34%, near the lower limit of normal.     CONCLUSION:  1.  Patent common bile duct and cystic duct.  2.  Gallbladder ejection fraction at the lower limits of normal.     JOSE MARIA AGEE MD     Findings:        White light and endosonographic findings :        A curved linear was utilized throughout. Limited oblique white light        imaging excluded inflammation or ulcerations of the foregut. Tangential        views of the ampulla were without lesion. In terms of endosonography,        there was no evidence of concerning pancreatic, ampullary, biliary or        duodenal solid lesions. The gallbladder is in situ and contained mobile,        layering hyperechoic content (sludge) without wall thickening. The        common duct was traced from the bifurcation to the ampulla and was found        to be top normal caliber at 8.8mm with smooth taper to less than 3mm        within the ampulla. There was no intraductal asymmetric wall thickening        or internal solid component. A small, 4mm anechoic simple cyst was found        in the uncinate, though no other liquid or solid pancreatic lesions were        demonstrated within a diffusely homogeneous parenchyma that was without        foci, strands or lobularity. The main duct was decompressed throuhgout,        measurnig just under 3mm at the head with smooth taper to less than 1mm        in the body with further taper upsream. The duct was traditional and        ventral dominant without significant irregularity. There was no evidence        of peripancreatic, periportal, perigastric or celiac lymphadenopathy.        The major vasculature of the abdomen including the celiac trunk,        splenics, superior mesenterics, gastroduodenal and portal appeared        traditional and unremarkable. Limited views of the left lobe, right lobe        caudate, left adrenal  and spleen were without concerning lesion, though        the spleen appeared generous in size.                                                                                     Impression:               - No indication for tandem ERCP; we suspect the                             dilated common duct seen on imaging included the                             caliber of the cystic duct at the site of insertion                             - Gallbladder sludge without overt evidence of                             cholecystitis                             - No evidence of choledochlithiasis                             - No evidence of pancreaticobiliary or duodenal                             lesions save for a small (3mm) cystic lesion of the                             head of the pancreas; this may represent a side                             branched intraductal papillary mucinous neoplasm                             and therefore deserves noninvasive MRI/MRCP in one                             year                             - No evidence of lymphadenopathy or vascular                             abnormality                             - Grossly healthy appearing pancreatic parenchyma                             and duct   Recommendation:           - General anesthesia recovery with probable                             discharge home this morning/afternoon                             - HIDA scan as our next step in evaluation RUQ                             pain; if negative we will need to consider                             therapies for irritable bowel                             - MRI/MRCP in one year for follow up of small                             cystic lesion (as explained above) and for                             surveillance                             - The findings and recommendations were discussed                             with the patient and their family                                                                           Again, thank you for allowing me to participate in the care of your patient.      Sincerely,    William Leslie MD

## 2020-11-29 ENCOUNTER — HEALTH MAINTENANCE LETTER (OUTPATIENT)
Age: 55
End: 2020-11-29

## 2021-01-14 ENCOUNTER — NURSE TRIAGE (OUTPATIENT)
Dept: FAMILY MEDICINE | Facility: CLINIC | Age: 56
End: 2021-01-14

## 2021-01-14 NOTE — TELEPHONE ENCOUNTER
Reason for call:  Patient reporting a symptom    Symptom or request: Diarrhea    Duration (how long have symptoms been present): for 8 days    Additional comments: Patient has been drinking Gator Aid    Phone Number patient can be reached at:  Cell number on file:    Telephone Information:   Mobile [phone]       Best Time:  anytime    Can we leave a detailed message on this number:  YES    Call taken on 1/14/2021 at 10:23 AM by Caron Pressley

## 2021-01-14 NOTE — TELEPHONE ENCOUNTER
Pt has diarrhea x8 days. No fever, abdominal pain, blood or mucus in stool. Advised BRAT diet, Pepto Bismol, and hydration with water and gatorade. Scheduled virtual visit with team tomorrow. She will cancel if feeling better.    Additional Information    Negative: Shock suspected (e.g., cold/pale/clammy skin, too weak to stand, low BP, rapid pulse)    Negative: Difficult to awaken or acting confused (e.g., disoriented, slurred speech)    Negative: Sounds like a life-threatening emergency to the triager    Negative: Vomiting also present and worse than the diarrhea    Negative: Blood in stool and without diarrhea    Negative: SEVERE abdominal pain (e.g., excruciating) and present > 1 hour    Negative: SEVERE abdominal pain and age > 60    Negative: Bloody, black, or tarry bowel movements    Negative: SEVERE diarrhea (e.g., 7 or more times / day more than normal) and age > 60 years    Negative: Constant abdominal pain lasting > 2 hours    Negative: Drinking very little and has signs of dehydration (e.g., no urine > 12 hours, very dry mouth, very lightheaded)    Negative: Patient sounds very sick or weak to the triager    Negative: MODERATE diarrhea (e.g., 4-6 times / day more than normal) and age > 70 years    Negative: MODERATE diarrhea (e.g., 4-6 times / day more than normal) and present > 48 hours (2 days)    Negative: SEVERE diarrhea (e.g., 7 or more times / day more than normal) and present > 24 hours (1 day)    Negative: Abdominal pain  (Exception: pain clears completely with each passage of diarrhea stool)    Negative: Fever > 101 F (38.3 C)    Negative: Blood in the stool    Negative: Mucus or pus in stool has been present > 2 days and diarrhea is more than mild    Negative: Weak immune system (e.g., HIV positive, cancer chemo, splenectomy, organ transplant, chronic steroids)    MILD diarrhea (e.g., 1-3 or more stools than normal in past 24 hours) diarrhea without known cause and present > 7 days    Negative:  "Travel to a foreign country in past month    Negative: Recent antibiotic therapy (i.e., within last 2 months) and diarrhea present > 3 days since antibiotic was stopped    Negative: Recent hospitalization and diarrhea present > 3 days    Negative: Tube feedings (e.g., nasogastric, g-tube, j-tube)    MILD-MODERATE diarrhea (e.g., 1-6 times / day more than normal)    Answer Assessment - Initial Assessment Questions  1. DIARRHEA SEVERITY: \"How bad is the diarrhea?\" \"How many extra stools have you had in the past 24 hours than normal?\"     - NO DIARRHEA (SCALE 0)    - MILD (SCALE 1-3): Few loose or mushy BMs; increase of 1-3 stools over normal daily number of stools; mild increase in ostomy output.    -  MODERATE (SCALE 4-7): Increase of 4-6 stools daily over normal; moderate increase in ostomy output.  * SEVERE (SCALE 8-10; OR 'WORST POSSIBLE'): Increase of 7 or more stools daily over normal; moderate increase in ostomy output; incontinence.      3  2. ONSET: \"When did the diarrhea begin?\"       8 days ago  3. BM CONSISTENCY: \"How loose or watery is the diarrhea?\"       watery  4. VOMITING: \"Are you also vomiting?\" If so, ask: \"How many times in the past 24 hours?\"       no  5. ABDOMINAL PAIN: \"Are you having any abdominal pain?\" If yes: \"What does it feel like?\" (e.g., crampy, dull, intermittent, constant)       none  6. ABDOMINAL PAIN SEVERITY: If present, ask: \"How bad is the pain?\"  (e.g., Scale 1-10; mild, moderate, or severe)    - MILD (1-3): doesn't interfere with normal activities, abdomen soft and not tender to touch     - MODERATE (4-7): interferes with normal activities or awakens from sleep, tender to touch     - SEVERE (8-10): excruciating pain, doubled over, unable to do any normal activities        0  7. ORAL INTAKE: If vomiting, \"Have you been able to drink liquids?\" \"How much fluids have you had in the past 24 hours?\"      0  8. HYDRATION: \"Any signs of dehydration?\" (e.g., dry mouth [not just dry " "lips], too weak to stand, dizziness, new weight loss) \"When did you last urinate?\"      No signs, urinated this morning  9. EXPOSURE: \"Have you traveled to a foreign country recently?\" \"Have you been exposed to anyone with diarrhea?\" \"Could you have eaten any food that was spoiled?\"      none  10. ANTIBIOTIC USE: \"Are you taking antibiotics now or have you taken antibiotics in the past 2 months?\"        none  11. OTHER SYMPTOMS: \"Do you have any other symptoms?\" (e.g., fever, blood in stool)        none  12. PREGNANCY: \"Is there any chance you are pregnant?\" \"When was your last menstrual period?\"        none    Protocols used: DIARRHEA-A-OH    "

## 2021-01-15 ENCOUNTER — VIRTUAL VISIT (OUTPATIENT)
Dept: FAMILY MEDICINE | Facility: CLINIC | Age: 56
End: 2021-01-15
Payer: COMMERCIAL

## 2021-01-15 DIAGNOSIS — R19.7 ACUTE DIARRHEA: Primary | ICD-10-CM

## 2021-01-15 PROCEDURE — 99213 OFFICE O/P EST LOW 20 MIN: CPT | Mod: 95 | Performed by: INTERNAL MEDICINE

## 2021-01-15 NOTE — PROGRESS NOTES
"Vero is a 55 year old who is being evaluated via a billable video visit.      How would you like to obtain your AVS? MyChart  If the video visit is dropped, the invitation should be resent by: Text to cell phone: ABDIFATAH [phone]  Will anyone else be joining your video visit? No    Video Start Time: 10:00 AM  Assessment & Plan     This is a patient with acute diarrhea.  Uncertain etiology.  No fever or blood.  It should be self-limiting.  If she develops fever or blood per rectum she needs to contact us.  The patient should have stool studies and stool leukocytes should her symptomatology recur.    Other possibilities would be colitis related to NSAIDs.  This too should resolve with the discontinuation of the anti-inflammatories as I described to the patient today.    She should continue her bland diet I would recommend she discontinue Pepto-Bismol at this juncture    Review of external notes as documented above          BMI:   Estimated body mass index is 26.61 kg/m  as calculated from the following:    Height as of 10/22/20: 1.727 m (5' 8\").    Weight as of 10/22/20: 79.4 kg (175 lb).         See Patient Instructions    No follow-ups on file.    Sukumar Pham MD  [facility]    Raisa Pham is a 55 year old who presents to clinic today for the following health issues     HPI is a patient with 9 days of diarrhea.  She states this started after eating a soup that had sausage and kale.  No significant nausea or vomiting.  She was having 4-5 loose stools daily.  Patient has been utilizing Aleve for pain relief additionally.    She has never had symptoms consistent with this.  She has no fever.  There is no blood per rectum.  No recent antibiotic use and no exposure to known ill contacts      New Patient/Transfer of Care  New Patient/Transfer of Care    Review of Systems   Constitutional, HEENT, cardiovascular, pulmonary, gi and gu systems are negative, except as otherwise noted.    "   Objective           Vitals:  No vitals were obtained today due to virtual visit.    Physical Exam   GENERAL: Healthy, alert and no distress  EYES: Eyes grossly normal to inspection.  No discharge or erythema, or obvious scleral/conjunctival abnormalities.  RESP: No audible wheeze, cough, or visible cyanosis.  No visible retractions or increased work of breathing.    SKIN: Visible skin clear. No significant rash, abnormal pigmentation or lesions.  NEURO: Cranial nerves grossly intact.  Mentation and speech appropriate for age.  PSYCH: Mentation appears normal, affect normal/bright, judgement and insight intact, normal speech and appearance well-groomed.    Admission on 07/20/2020, Discharged on 07/20/2020   Component Date Value Ref Range Status     Upper EUS 07/20/2020    Final                    Value:[facility]  _______________________________________________________________________________  Patient Name: Vero Lua            Procedure Date: 7/20/2020 9:52 AM  MRN: [id number]                       Account Number: [id number]  YOB: 1965              Admit Type: Outpatient  Age: 54                                Note Status: Finalized  Attending MD: Juan Hurst MD   Total Sedation Time:   Instrument Name: 429 [id number] EUS Linear   _______________________________________________________________________________     Procedure:                Upper EUS  Indications:              Common bile duct dilation (acquired) seen on CT                             scan, Abnormal ultrasound of the abdomen, Abdominal                             pain in the right upper quadrant  Providers:                Juan Hurst MD, Virginie Borges, Technician  Patient Profile:          Ms Lua is a 53yo woman with a strong family                                                       history of pancreas and liver cancer who has been                             struggling  with RUQ pain and was found to have a                             dilated common duct on imaging. She now proceeds to                             EUS with tandem ERCP planned if appropriate based                             on EUS findings.  Referring MD:             Emile Jara MD  Medicines:                General Anesthesia  Complications:            No immediate complications.  _______________________________________________________________________________  Procedure:                Pre-Anesthesia Assessment:                            - Prior to the procedure, a History and Physical                             was performed, and patient medications and                             allergies were reviewed. The patient is competent.                             The risks and benefits of the procedure and the                             sedation options and risks were discuss                          ed with the                             patient. All questions were answered and informed                             consent was obtained. Patient identification and                             proposed procedure were verified by the nurse in                             the pre-procedure area. Mental Status Examination:                             alert and oriented. Airway Examination: Mallampati                             Class II (the uvula but not tonsillar pillars                             visualized). Respiratory Examination: clear to                             auscultation. CV Examination: normal. ASA Grade                             Assessment: I - A normal, healthy patient. After                             reviewing the risks and benefits, the patient was                             deemed in satisfactory condition to undergo the                             procedure. The anesthesia plan was to use general                             anesthesia. Immediately prior to adm                           inistration of                             medications, the patient was re-assessed for                             adequacy to receive sedatives. The heart rate,                             respiratory rate, oxygen saturations, blood                             pressure, adequacy of pulmonary ventilation, and                             response to care were monitored throughout the                             procedure. The physical status of the patient was                             re-assessed after the procedure. After obtaining                             informed consent, the endoscope was passed under                             direct vision. Throughout the procedure, the                             patient's blood pressure, pulse, and oxygen                             saturations were monitored continuously. The                             echoeendoscope was introduced through the mouth,                             and advanced to the second part of duodenum. The                                                       upper EUS was accomplished without difficulty. The                             patient tolerated the procedure well.                                                                                   Findings:       White light and endosonographic findings :       A curved linear was utilized throughout. Limited oblique white light        imaging excluded inflammation or ulcerations of the foregut. Tangential        views of the ampulla were without lesion. In terms of endosonography,        there was no evidence of concerning pancreatic, ampullary, biliary or        duodenal solid lesions. The gallbladder is in situ and contained mobile,        layering hyperechoic content (sludge) without wall thickening. The        common duct was traced from the bifurcation to the ampulla and was found        to be top normal caliber at 8.8mm with smooth taper to less than 3mm        within the ampulla.  There was no intraductal asymmetric wall thickening        or internal solid c                          omponent. A small, 4mm anechoic simple cyst was found        in the uncinate, though no other liquid or solid pancreatic lesions were        demonstrated within a diffusely homogeneous parenchyma that was without        foci, strands or lobularity. The main duct was decompressed throuhgout,        measurnig just under 3mm at the head with smooth taper to less than 1mm        in the body with further taper upsream. The duct was traditional and        ventral dominant without significant irregularity. There was no evidence        of peripancreatic, periportal, perigastric or celiac lymphadenopathy.        The major vasculature of the abdomen including the celiac trunk,        splenics, superior mesenterics, gastroduodenal and portal appeared        traditional and unremarkable. Limited views of the left lobe, right lobe        caudate, left adrenal and spleen were without concerning lesion, though        the spleen appeared generous in size.                                                                                                             Impression:               - No indication for tandem ERCP; we suspect the                             dilated common duct seen on imaging included the                             caliber of the cystic duct at the site of insertion                            - Gallbladder sludge without overt evidence of                             cholecystitis                            - No evidence of choledochlithiasis                            - No evidence of pancreaticobiliary or duodenal                             lesions save for a small (3mm) cystic lesion of the                             head of the pancreas; this may represent a side                             branched intraductal papillary mucinous neoplasm                             and therefore deserves noninvasive  MRI/MRCP in one                             year                            - No evidence of lymphadenopathy or vascular                             abnormality                                                      - Grossly healthy appearing pancreatic parenchyma                             and duct  Recommendation:           - General anesthesia recovery with probable                             discharge home this morning/afternoon                            - HIDA scan as our next step in evaluation RUQ                             pain; if negative we will need to consider                             therapies for irritable bowel                            - MRI/MRCP in one year for follow up of small                             cystic lesion (as explained above) and for                             surveillance                            - The findings and recommendations were discussed                             with the patient and their family                                                                                   Procedure Code(s):       --- Professional ---       01805, Esophagogastroduodenoscopy, flexible, transoral; with endoscopic        ultrasound examination limited                           to the esophagus, stomach or duodenum,        and adjacent structures  Diagnosis Code(s):       --- Professional ---       R93.5, Abnormal findings on diagnostic imaging of other abdominal        regions, including retroperitoneum       K83.8, Other specified diseases of biliary tract       R10.11, Right upper quadrant pain    CPT copyright 2019 [employer]. All rights reserved.    The codes documented in this report are preliminary and upon  review may   be revised to meet current compliance requirements.    electronically signed by JEREMIAH Hurst  _____________________  Juan Hurst MD  7/20/2020 10:57:38 AM  I was physically present for the entire viewing portion of the  exam.  Juan Hurst MD  Number of Addenda: 0    Note Initiated On: 7/20/2020 9:52 AM  MRN:                      [id number]  Procedure Date:           7/20/2020 9:52:55 AM  Total Procedure Duration: 0 hours 10 minutes 54 seconds   Estimated Blood Loss:       Scope In: 10:27:01 AM  Scope Out                          : 10:37:55 AM                   Video-Visit Details    Type of service:  Video Visit    Video End Time: 1010    Originating Location (pt. Location): Home    Distant Location (provider location):  [facility]     Platform used for Video Visit: Unable to complete video visit

## 2021-01-27 ENCOUNTER — HOSPITAL ENCOUNTER (OUTPATIENT)
Facility: AMBULATORY SURGERY CENTER | Age: 56
End: 2021-01-27
Attending: SURGERY
Payer: COMMERCIAL

## 2021-01-27 ENCOUNTER — TELEPHONE (OUTPATIENT)
Dept: SURGERY | Facility: CLINIC | Age: 56
End: 2021-01-27

## 2021-01-27 ENCOUNTER — PREP FOR PROCEDURE (OUTPATIENT)
Dept: ENDOCRINOLOGY | Facility: CLINIC | Age: 56
End: 2021-01-27

## 2021-01-27 DIAGNOSIS — R93.3 ABNORMAL FINDING ON GI TRACT IMAGING: ICD-10-CM

## 2021-01-27 DIAGNOSIS — K82.8 SLUDGE IN GALLBLADDER: ICD-10-CM

## 2021-01-27 DIAGNOSIS — Z11.59 ENCOUNTER FOR SCREENING FOR OTHER VIRAL DISEASES: Primary | ICD-10-CM

## 2021-01-27 DIAGNOSIS — K82.8 SLUDGE IN GALLBLADDER: Primary | ICD-10-CM

## 2021-01-27 RX ORDER — CEFAZOLIN SODIUM 2 G/50ML
2 SOLUTION INTRAVENOUS
Status: CANCELLED | OUTPATIENT
Start: 2021-01-27

## 2021-01-27 RX ORDER — CEFAZOLIN SODIUM 1 G/50ML
1 INJECTION, SOLUTION INTRAVENOUS SEE ADMIN INSTRUCTIONS
Status: CANCELLED | OUTPATIENT
Start: 2021-01-27

## 2021-01-27 NOTE — TELEPHONE ENCOUNTER
Called patient to discuss surgery date of 3/8/21 at the [facility] for laparoscopic cholecystectomy with . Offered 7:15 case start time.   Patient will need to schedule her H&P with her primary care provider.   Will need COVID-19 lab on Thursday, 2/4. VM message left as patient did not .

## 2021-02-19 ENCOUNTER — TELEPHONE (OUTPATIENT)
Dept: SURGERY | Facility: CLINIC | Age: 56
End: 2021-02-19

## 2021-02-19 NOTE — TELEPHONE ENCOUNTER
Patient called back stating she was starting a new job and wanted to push the surgery out. She said she has spring break April 1-9; however,  is in [place] that week. Patient states she could do this sometime mid-March. I will check with [facility] scheduling and call her back with some options.

## 2021-02-19 NOTE — TELEPHONE ENCOUNTER
FUTURE VISIT INFORMATION      SURGERY INFORMATION:    Date: 3.24.21    [place] [facility] [place]     Surgeon:  William Leslie     Anesthesia Type:  General     Procedure: CHOLECYSTECTOMY, LAPAROSCOPIC    Consult: 3.12.21    RECORDS REQUESTED FROM:       Primary Care Provider: Emile Jara

## 2021-02-19 NOTE — TELEPHONE ENCOUNTER
Called patient to discuss surgery date of March 24 for laparoscopic cholecystectomy with . That date will work for her.   Case start time 7:15 a.m., to arrive 5:45 a.m. Directions to [address] [place] [address] PAC scheduled March 12, to go up to 5th floor.   Phone number given to schedule COVID-19 lab, to be done Saturday, March 20.

## 2021-02-24 ENCOUNTER — TELEPHONE (OUTPATIENT)
Dept: FAMILY MEDICINE | Facility: CLINIC | Age: 56
End: 2021-02-24

## 2021-02-24 NOTE — TELEPHONE ENCOUNTER
Reason for Call:  Other prescription    Detailed comments: Patient called and we made an apt for 3/9 but she is having symptoms of pink eye and is wondering if she can get anything for it to get her to the apt. Please advise thank you    Phone Number Patient can be reached at: Home number on file [phone] (home)    Best Time: anytime    Can we leave a detailed message on this number? YES    Call taken on 2/24/2021 at 3:58 PM by Jo Reddy

## 2021-02-25 NOTE — TELEPHONE ENCOUNTER
I called patient and got her scheduled with Dr. Hawk on 2/26/21 for a video appointment.   Jo Lantigua MA

## 2021-02-26 ENCOUNTER — VIRTUAL VISIT (OUTPATIENT)
Dept: FAMILY MEDICINE | Facility: CLINIC | Age: 56
End: 2021-02-26
Payer: COMMERCIAL

## 2021-02-26 DIAGNOSIS — H10.023 PINK EYE DISEASE OF BOTH EYES: Primary | ICD-10-CM

## 2021-02-26 DIAGNOSIS — E03.9 HYPOTHYROIDISM, UNSPECIFIED TYPE: ICD-10-CM

## 2021-02-26 PROCEDURE — 99214 OFFICE O/P EST MOD 30 MIN: CPT | Mod: 95 | Performed by: INTERNAL MEDICINE

## 2021-02-26 RX ORDER — LEVOTHYROXINE SODIUM 100 UG/1
100 TABLET ORAL DAILY
Qty: 90 TABLET | Refills: 1 | Status: SHIPPED | OUTPATIENT
Start: 2021-02-26 | End: 2021-10-25

## 2021-02-26 RX ORDER — ERYTHROMYCIN 5 MG/G
0.5 OINTMENT OPHTHALMIC 2 TIMES DAILY
Qty: 3.5 G | Refills: 1 | Status: SHIPPED | OUTPATIENT
Start: 2021-02-26 | End: 2021-03-09

## 2021-02-26 NOTE — PROGRESS NOTES
Vero is a 55 year old who is being evaluated via a billable video visit.      How would you like to obtain your AVS? MyChart  If the video visit is dropped, the invitation should be resent by: Text to cell phone: [phone] - dox  Will anyone else be joining your video visit? No    Video Start Time: 1:01 PM      Subjective   Vero is a 55 year old who presents for video visit for evaluation of the following health issues    HPI       Chief Complaint   Patient presents with     Eye Problem     Bilateral eye problem - possible pink eye x 1 week        HPI:     Pink eye symptoms    Duration:     Since: acute           Specific cause: none    Description:      Location of pain: bilateral eyes     Character of pain: eye pains     Pain radiation: none    Intensity: moderate    History:      Pain interferes with job: yes     History of similar pain problems: no     Any previous MRI or X-rays: N/A     Therapies tried without relief: none    Alleviating factors:      Improved by: none    Precipitating factors:    Worsened by: none    Accompanying Signs & Symptoms: red, itchy eyes            Current Medications:     Current Outpatient Medications   Medication Sig Dispense Refill     BOTOX 200 units injection        buPROPion (WELLBUTRIN XL) 150 MG 24 hr tablet        clonazePAM (KLONOPIN) 1 MG tablet Take 1 tablet (1 mg) by mouth At Bedtime 90 tablet 0     erythromycin (ROMYCIN) 5 MG/GM ophthalmic ointment Place 0.5 inches into both eyes 2 times daily 3.5 g 1     levothyroxine (SYNTHROID/LEVOTHROID) 100 MCG tablet Take 1 tablet (100 mcg) by mouth daily 90 tablet 1     loratadine (CLARITIN) 10 MG tablet Take 10 mg by mouth daily as needed  30 tablet 1     rizatriptan (MAXALT) 10 MG tablet        topiramate (TOPAMAX) 200 MG tablet Take 200 mg by mouth daily        vortioxetine (BRINTELLIX) 10 MG tablet Take 1 tablet (10 mg) by mouth daily 90 tablet 3     zolpidem (AMBIEN) 5 MG tablet TAKE 1 TABLET BY MOUTH AT BEDTIME AS NEEDED  90 tablet 0         Allergies:      Allergies   Allergen Reactions     Adhesive Tape Rash     Some tapes and surgical glue            Past Medical History:     Past Medical History:   Diagnosis Date     Adenomatous polyp of colon 5/16    fu 5 years, [place]     Anxiety      Chronic insomnia     on ambien and klonopin for over 10 years together     Depression, major, in partial remission (H)     was seeing psyche until 2015, not since then     Fibromyalgia 1998     Hypothyroid 90's     Interstitial cystitis     urol Platter and had interstem     Migraines     Dr. Cash of neuro, got botox 2012 via [facility] Dr. Kevin, now Dr. Watkins     Restless leg syndrome      Urgency of urination     interstim         Past Surgical History:     Past Surgical History:   Procedure Laterality Date     COLONOSCOPY       ENDOSCOPIC ULTRASOUND UPPER GASTROINTESTINAL TRACT (GI) N/A 7/20/2020    Procedure: DIAGNOSTIC ENDOSCOPIC ULTRASOUND, ESOPHAGOSCOPY / UPPER GASTROINTESTINAL TRACT;  Surgeon: Juan Hurst MD;  Location: [facility] OR     interstim procedure  2015, 2010 and 2008    [facility]         Family Medical History:     Family History   Problem Relation Age of Onset     Mental Illness Mother      Depression Sister      Depression Father      Osteoporosis Maternal Grandmother          Social History:     Social History     Socioeconomic History     Marital status:      Spouse name: Not on file     Number of children: 1     Years of education: Not on file     Highest education level: Not on file   Occupational History     Occupation: homemaker     Employer: NONE    Social Needs     Financial resource strain: Not on file     Food insecurity     Worry: Not on file     Inability: Not on file     Transportation needs     Medical: Not on file     Non-medical: Not on file   Tobacco Use     Smoking status: Never Smoker     Smokeless tobacco: Never Used   Substance and Sexual Activity     Alcohol use: No     Alcohol/week: 0.0  standard drinks     Drug use: No     Sexual activity: Yes     Partners: Male     Birth control/protection: Post-menopausal   Lifestyle     Physical activity     Days per week: Not on file     Minutes per session: Not on file     Stress: Not on file   Relationships     Social connections     Talks on phone: Not on file     Gets together: Not on file     Attends Caodaism service: Not on file     Active member of club or organization: Not on file     Attends meetings of clubs or organizations: Not on file     Relationship status: Not on file     Intimate partner violence     Fear of current or ex partner: Not on file     Emotionally abused: Not on file     Physically abused: Not on file     Forced sexual activity: Not on file   Other Topics Concern     Parent/sibling w/ CABG, MI or angioplasty before 65F 55M? Not Asked   Social History Narrative     Not on file           Review of System:     Constitutional: Negative for fever or chills  Skin: Negative for rashes  Eyes: positive for bilateral pink eye symptoms  Ears/Nose/Throat: Negative for nasal congestion, sore throat  Respiratory: No shortness of breath, dyspnea on exertion, cough, or hemoptysis  Cardiovascular: Negative for chest pain  Gastrointestinal: Negative for nausea, vomiting  Genitourinary: Negative for dysuria, hematuria  Musculoskeletal: Negative for myalgias  Neurologic: Negative for headaches  Psychiatric: Negative for depression, anxiety  Hematologic/Lymphatic/Immunologic: Negative  Endocrine: positive for hypothyroidism  Behavioral: Negative for tobacco use       Physical Exam:   LMP 06/27/2012     GENERAL: alert and no distress  EYES: red itchy eyes present bilaterally  HENT: Normocephalic atraumatic. Nose and mouth without ulcers or lesions  NECK: supple  RESP: no cough present  CV: patient appears to be hemodynamically stable  LYMPH: no peripheral edema   ABDOMEN: nondistended  MS: no gross musculoskeletal defects noted  SKIN: no suspicious  lesions or rashes  NEURO: Alert & Oriented x 3.   PSYCH: mentation appears normal, affect normal        Diagnostic Test Results:     Diagnostic Test Results:  Labs reviewed in Epic    ASSESSMENT/PLAN:       (H10.023) Pink eye disease of both eyes  (primary encounter diagnosis)  Comment: acute bilateral pink eye symptoms  Plan: erythromycin (ROMYCIN) 5 MG/GM ophthalmic         ointment      (E03.9) Hypothyroidism, unspecified type  Comment: stable. Patient is due for a refill of levothyroxine medication.  Plan: **TSH with free T4 reflex FUTURE anytime,         levothyroxine (SYNTHROID/LEVOTHROID) 100 MCG         tablet        Follow Up Plan:     Patient is instructed to return to Internal Medicine clinic for follow-up visit in 1 week.        Berna Hawk MD  Internal Medicine  [facility]        Video-Visit Details    Type of service:  Video Visit    Video End Time: 1:33 PM    Originating Location (pt. Location): Home    Distant Location (provider location):  [facility]     Platform used for Video Visit: Juan Antonio

## 2021-03-05 ENCOUNTER — TELEPHONE (OUTPATIENT)
Dept: SURGERY | Facility: CLINIC | Age: 56
End: 2021-03-05

## 2021-03-05 NOTE — TELEPHONE ENCOUNTER
Patient called stating she has been offered the COVID vaccine and wants to know if she can do this before surgery or should wait until after surgery which is scheduled 3/24/21.  states she can do it anytime just not within 2 days of surgery date.  Left VM message of above.

## 2021-03-05 NOTE — TELEPHONE ENCOUNTER
Called patient to let her know per Sandy Lunsford RN, patient is not to have the shot within 14 days of surgery and not until 14 days after surgery.

## 2021-03-09 ENCOUNTER — VIRTUAL VISIT (OUTPATIENT)
Dept: FAMILY MEDICINE | Facility: CLINIC | Age: 56
End: 2021-03-09
Payer: COMMERCIAL

## 2021-03-09 DIAGNOSIS — R53.83 OTHER FATIGUE: Primary | ICD-10-CM

## 2021-03-09 DIAGNOSIS — E03.9 HYPOTHYROIDISM, UNSPECIFIED TYPE: ICD-10-CM

## 2021-03-09 DIAGNOSIS — F33.41 RECURRENT MAJOR DEPRESSIVE DISORDER, IN PARTIAL REMISSION (H): ICD-10-CM

## 2021-03-09 DIAGNOSIS — M79.7 FIBROMYALGIA: ICD-10-CM

## 2021-03-09 PROCEDURE — 99213 OFFICE O/P EST LOW 20 MIN: CPT | Mod: TEL | Performed by: INTERNAL MEDICINE

## 2021-03-09 PROCEDURE — 96127 BRIEF EMOTIONAL/BEHAV ASSMT: CPT | Performed by: INTERNAL MEDICINE

## 2021-03-09 ASSESSMENT — PATIENT HEALTH QUESTIONNAIRE - PHQ9: SUM OF ALL RESPONSES TO PHQ QUESTIONS 1-9: 0

## 2021-03-09 NOTE — PROGRESS NOTES
Vero is a 55 year old who is being evaluated via a billable telephone visit.      What phone number would you like to be contacted at? [phone]  How would you like to obtain your AVS? Manuela Pham is a 55 year old who presents for the following health issues    She notes increasing fatigue, cold all the time and some muscle pain.  She has had constipation.  Her weight has gone up last few months about 10 pounds.  She has had fatigue for years but much worse lately.  She sleeps fine but does not feel refreshed. She feels her depression has been controlled.  She is seeing a counselor.    No fever or nightly ns.      She has had Pink eye and used the emycin oint but has not resolved.  Can be a bit crusty in am. Not having vision loss, sometimes can burn.      Has been working full time since the fall, then again in February.      Past Medical History:   Diagnosis Date     Adenomatous polyp of colon 05/2016    fu 5 years, mn gi     Anxiety      Chronic insomnia     on ambien and klonopin for over 10 years together     Depression, major, in partial remission (H)     was seeing psyche until 2015, not since then     Dilated bile duct 2020    extrahepatic, seen on ct for ruq pain, then eus done and no stricture seen     Fibromyalgia 1998     Hypothyroid 90's     Interstitial cystitis     urol [place] and had interstem     Migraines     Dr. Cash of neuro, got botox 2012 via [facility] Dr. Kevin, now Dr. Watkins     Restless leg syndrome      Urgency of urination     interstim     Past Surgical History:   Procedure Laterality Date     COLONOSCOPY       ENDOSCOPIC ULTRASOUND UPPER GASTROINTESTINAL TRACT (GI) N/A 7/20/2020    Procedure: DIAGNOSTIC ENDOSCOPIC ULTRASOUND, ESOPHAGOSCOPY / UPPER GASTROINTESTINAL TRACT;  Surgeon: Juan Hurst MD;  Location:  OR     interstim procedure  2015, 2010 and 2008    [facility]     Social History     Socioeconomic History     Marital status:       Spouse name: Not on file     Number of children: 1     Years of education: Not on file     Highest education level: Not on file   Occupational History     Occupation: homemaker     Employer: NONE    Social Needs     Financial resource strain: Not on file     Food insecurity     Worry: Not on file     Inability: Not on file     Transportation needs     Medical: Not on file     Non-medical: Not on file   Tobacco Use     Smoking status: Never Smoker     Smokeless tobacco: Never Used   Substance and Sexual Activity     Alcohol use: No     Alcohol/week: 0.0 standard drinks     Drug use: No     Sexual activity: Yes     Partners: Male     Birth control/protection: Post-menopausal   Lifestyle     Physical activity     Days per week: Not on file     Minutes per session: Not on file     Stress: Not on file   Relationships     Social connections     Talks on phone: Not on file     Gets together: Not on file     Attends Latter-day service: Not on file     Active member of club or organization: Not on file     Attends meetings of clubs or organizations: Not on file     Relationship status: Not on file     Intimate partner violence     Fear of current or ex partner: Not on file     Emotionally abused: Not on file     Physically abused: Not on file     Forced sexual activity: Not on file   Other Topics Concern     Parent/sibling w/ CABG, MI or angioplasty before 65F 55M? Not Asked   Social History Narrative     Not on file     Current Outpatient Medications   Medication Sig Dispense Refill     BOTOX 200 units injection        buPROPion (WELLBUTRIN XL) 150 MG 24 hr tablet        clonazePAM (KLONOPIN) 1 MG tablet Take 1 tablet (1 mg) by mouth At Bedtime 90 tablet 0     levothyroxine (SYNTHROID/LEVOTHROID) 100 MCG tablet Take 1 tablet (100 mcg) by mouth daily 90 tablet 1     loratadine (CLARITIN) 10 MG tablet Take 10 mg by mouth daily as needed  30 tablet 1     rizatriptan (MAXALT) 10 MG tablet        topiramate (TOPAMAX) 200 MG  tablet Take 200 mg by mouth daily        vortioxetine (BRINTELLIX) 10 MG tablet Take 1 tablet (10 mg) by mouth daily 90 tablet 3     zolpidem (AMBIEN) 5 MG tablet TAKE 1 TABLET BY MOUTH AT BEDTIME AS NEEDED 90 tablet 0     Allergies   Allergen Reactions     Adhesive Tape Rash     Some tapes and surgical glue     FAMILY HISTORY NOTED AND REVIEWED    REVIEW OF SYSTEMS: above    ASSESSMENT:  1. Fatigue, not clear if thyroid related, doubt anemia, doubt tumor, other cause, could be fibro or depression  2. Depression, controlled  3. Hypothyroidism    PLAN:  Labs to be done  Time 16 minutes including chart review    Emile Jara M.D.

## 2021-03-12 ENCOUNTER — VIRTUAL VISIT (OUTPATIENT)
Dept: SURGERY | Facility: CLINIC | Age: 56
End: 2021-03-12
Payer: COMMERCIAL

## 2021-03-12 ENCOUNTER — PRE VISIT (OUTPATIENT)
Dept: SURGERY | Facility: CLINIC | Age: 56
End: 2021-03-12

## 2021-03-12 ENCOUNTER — ANESTHESIA EVENT (OUTPATIENT)
Dept: SURGERY | Facility: AMBULATORY SURGERY CENTER | Age: 56
End: 2021-03-12

## 2021-03-12 DIAGNOSIS — E03.9 HYPOTHYROIDISM, UNSPECIFIED TYPE: ICD-10-CM

## 2021-03-12 DIAGNOSIS — K82.8 SLUDGE IN GALLBLADDER: ICD-10-CM

## 2021-03-12 DIAGNOSIS — R53.83 OTHER FATIGUE: ICD-10-CM

## 2021-03-12 DIAGNOSIS — Z01.818 PREOP EXAMINATION: Primary | ICD-10-CM

## 2021-03-12 LAB
ERYTHROCYTE [DISTWIDTH] IN BLOOD BY AUTOMATED COUNT: 13.7 % (ref 10–15)
HCT VFR BLD AUTO: 41.4 % (ref 35–47)
HGB BLD-MCNC: 13.5 G/DL (ref 11.7–15.7)
MCH RBC QN AUTO: 31.4 PG (ref 26.5–33)
MCHC RBC AUTO-ENTMCNC: 32.6 G/DL (ref 31.5–36.5)
MCV RBC AUTO: 96 FL (ref 78–100)
PLATELET # BLD AUTO: 168 10E9/L (ref 150–450)
RBC # BLD AUTO: 4.3 10E12/L (ref 3.8–5.2)
WBC # BLD AUTO: 4.5 10E9/L (ref 4–11)

## 2021-03-12 PROCEDURE — 36415 COLL VENOUS BLD VENIPUNCTURE: CPT | Performed by: INTERNAL MEDICINE

## 2021-03-12 PROCEDURE — 85027 COMPLETE CBC AUTOMATED: CPT | Performed by: INTERNAL MEDICINE

## 2021-03-12 PROCEDURE — 99204 OFFICE O/P NEW MOD 45 MIN: CPT | Mod: GT | Performed by: NURSE PRACTITIONER

## 2021-03-12 PROCEDURE — 80053 COMPREHEN METABOLIC PANEL: CPT | Performed by: INTERNAL MEDICINE

## 2021-03-12 PROCEDURE — 84443 ASSAY THYROID STIM HORMONE: CPT | Performed by: INTERNAL MEDICINE

## 2021-03-12 RX ORDER — IBUPROFEN 200 MG
200 TABLET ORAL EVERY 4 HOURS PRN
COMMUNITY
End: 2021-08-20

## 2021-03-12 SDOH — ECONOMIC STABILITY: FOOD INSECURITY: WITHIN THE PAST 12 MONTHS, THE FOOD YOU BOUGHT JUST DIDN'T LAST AND YOU DIDN'T HAVE MONEY TO GET MORE.: NOT ASKED

## 2021-03-12 SDOH — ECONOMIC STABILITY: FOOD INSECURITY: WITHIN THE PAST 12 MONTHS, YOU WORRIED THAT YOUR FOOD WOULD RUN OUT BEFORE YOU GOT MONEY TO BUY MORE.: NOT ASKED

## 2021-03-12 SDOH — ECONOMIC STABILITY: TRANSPORTATION INSECURITY
IN THE PAST 12 MONTHS, HAS LACK OF TRANSPORTATION KEPT YOU FROM MEETINGS, WORK, OR FROM GETTING THINGS NEEDED FOR DAILY LIVING?: NOT ASKED

## 2021-03-12 SDOH — ECONOMIC STABILITY: TRANSPORTATION INSECURITY
IN THE PAST 12 MONTHS, HAS THE LACK OF TRANSPORTATION KEPT YOU FROM MEDICAL APPOINTMENTS OR FROM GETTING MEDICATIONS?: NOT ASKED

## 2021-03-12 SDOH — ECONOMIC STABILITY: INCOME INSECURITY: HOW HARD IS IT FOR YOU TO PAY FOR THE VERY BASICS LIKE FOOD, HOUSING, MEDICAL CARE, AND HEATING?: NOT ASKED

## 2021-03-12 ASSESSMENT — PAIN SCALES - GENERAL: PAINLEVEL: MILD PAIN (2)

## 2021-03-12 NOTE — PATIENT INSTRUCTIONS
Preparing for Your Surgery      Name:  Vero Lua   MRN:  [id number]   :  1965   Today's Date:  3/12/2021         Arriving for surgery:  Surgery date:  3/24/21  Arrival time:  5:45AM    Restrictions due to COVID 19:  One consistent visitor is allowed per patient  No ill visitors  All visitors must wear face mask     parking is available for anyone with mobility limitations or disabilities. (Monday- Friday 7 am- 5 pm)    Please come to:    [facility]  [address] [place] [id number]    Please check in on the 5th floor at the [facility]ulatory [facility]       What can I eat or drink?    -  You may eat and drink normally until 8 hours before surgery. (Until 3/23/21, 11:15PM)  -  You may have clear liquids up to 4 hours before surgery. (Until 3/24/21, 3:15AM)  Examples of clear liquids:  Water  Clear broth  Juices (apple, white grape, white cranberry  and cider) without pulp  Noncarbonated, powder based beverages  (lemonade and Noam-Aid)  Sodas (Sprite, 7-Up, ginger ale and seltzer)  Coffee or tea (without milk or cream)  Gatorade    --No alcohol for at least 24 hours before surgery    Which medicines can I take?    Hold Aspirin for 7 days before surgery.   Hold Multivitamins for 7 days before surgery.  Hold Supplements for 7 days before surgery.  Hold Ibuprofen (Advil, Motrin) for 1 day before surgery--unless otherwise directed by surgeon.  Hold Rizatriptan(Maxalt) for 24 hours prior to surgery  Hold Naproxen (Aleve) for 4 days before surgery.        -  PLEASE TAKE the following medications the day of surgery     Bupropion(Wellbutrin)  Levothyroxine    Loratadine(Claritin) as needed    How do I prepare myself?  - Please take 2 showers before surgery using Scrubcare or Hibiclens soap.    Use this soap only from the neck to your toes.     Leave the soap on your skin for one minute--then rinse thoroughly.      You may use your own shampoo and conditioner; no other hair  products.   - Please remove all jewelry and body piercings.  - No lotions, deodorants or fragrance.  - No makeup or fingernail polish.   - Bring your ID and insurance card.        - All patients are required to have a Covid-19 test within 4 days of surgery/procedure.      -Patients will be contacted by the [facility] scheduling team within 1 week of surgery to make an appointment.      - Patients may call the Scheduling team at [phone] if they have not been scheduled within 4 days of  surgery.      ALL PATIENTS ARE REQUIRED TO HAVE A RESPONSIBLE ADULT TO DRIVE AND BE IN ATTENDANCE WITH THEM FOR 24 HOURS FOLLOWING SURGERY       Questions or Concerns:    -For questions regarding the day of surgery please contact the Ambulatory Surgery Center at [phone].    -If you have health changes between today and your surgery please contact your surgeon.     For questions after surgery please call your surgeons office.

## 2021-03-12 NOTE — H&P (VIEW-ONLY)
Pre-Operative H & P         Video-Visit Details    Type of service:  Video Visit    Patient verbally consented to video service today: YES      Video Start Time: 1256  Video End Time (time video stopped): 1312    Originating Location (pt. Location): Other work    Distant Location (provider location):  [facility]     Mode of Communication:  Video Conference via [employer]      CC:  Preoperative exam to assess for increased cardiopulmonary risk while undergoing surgery and anesthesia.    Date of Encounter: 3/12/2021  Primary Care Physician:  Emile Jara  Associated diagnosis: sludge in gallbladder    HPI  Vero Lua is a 55 year old female who presents for pre-operative H & P in preparation for a CHOLECYSTECTOMY, LAPAROSCOPIC on 3/24/21 by  at [facility].     Vero Lua 55 year old female with allergic rhinitis, fibromyalgia, migraines, RLS, hypothyroidism, OAB (interstim in place), anxiety, depression and insomnia that has sludge in gallbladder.  The patient has been having abdominal pain off and on with some radiation to the right back since May 2020.  She has noted that the pain has been worse with fatty foods.  She has had multiple imaging studies done which indicated the sludge in the gallbladder and a lower than normal gallbladder ejection fracture.  She was subsequently referred to  for surgical consideration and the above listed procedure has been recommended for treatment.    History is obtained from the patient and the medical record.     Past Medical History  Past Medical History:   Diagnosis Date     Adenomatous polyp of colon 05/2016    fu 5 years, mn gi     Anxiety      Chronic insomnia     on ambien and klonopin for over 10 years together     Depression, major, in partial remission (H)     was seeing psyche until 2015, not since then     Dilated bile duct 2020    extrahepatic, seen on ct for ruq pain, then eus  done and no stricture seen     Fibromyalgia 1998     Hypothyroid 90's     Interstitial cystitis     urol [place] and had interstem     Migraines     Dr. Cash of neuro, got botox 2012 via [facility] Dr. Kevin, now Dr. Watkins     Restless leg syndrome      Urgency of urination     interstim       Past Surgical History  Past Surgical History:   Procedure Laterality Date     COLONOSCOPY       ENDOSCOPIC ULTRASOUND UPPER GASTROINTESTINAL TRACT (GI) N/A 7/20/2020    Procedure: DIAGNOSTIC ENDOSCOPIC ULTRASOUND, ESOPHAGOSCOPY / UPPER GASTROINTESTINAL TRACT;  Surgeon: Juan Hurst MD;  Location: [facility] OR     interstim procedure  2015, 2010 and 2008    [facility]       Hx of Blood transfusions/reactions: none    Hx of abnormal bleeding or anti-platelet use: none    Menstrual history: Patient's last menstrual period was 06/27/2012.:     Steroid use in the last year: none    Personal or FH with difficulty with Anesthesia:  none    Prior to Admission Medications  Current Outpatient Medications   Medication Sig Dispense Refill     BOTOX 200 units injection        buPROPion (WELLBUTRIN XL) 150 MG 24 hr tablet every morning        clonazePAM (KLONOPIN) 1 MG tablet Take 1 tablet (1 mg) by mouth At Bedtime 90 tablet 0     ibuprofen (ADVIL/MOTRIN) 200 MG tablet Take 200 mg by mouth every 4 hours as needed for mild pain       levothyroxine (SYNTHROID/LEVOTHROID) 100 MCG tablet Take 1 tablet (100 mcg) by mouth daily (Patient taking differently: Take 100 mcg by mouth every morning ) 90 tablet 1     loratadine (CLARITIN) 10 MG tablet Take 10 mg by mouth daily as needed  30 tablet 1     rizatriptan (MAXALT) 10 MG tablet at onset of headache        topiramate (TOPAMAX) 200 MG tablet Take 200 mg by mouth At Bedtime        vortioxetine (BRINTELLIX) 10 MG tablet Take 1 tablet (10 mg) by mouth daily (Patient taking differently: Take 10 mg by mouth At Bedtime ) 90 tablet 3     zolpidem (AMBIEN) 5 MG tablet TAKE 1 TABLET BY MOUTH AT BEDTIME  AS NEEDED (Patient taking differently: nightly as needed TAKE 1 TABLET BY MOUTH AT BEDTIME AS NEEDED) 90 tablet 0       Allergies  Allergies   Allergen Reactions     Adhesive Tape Rash     Some tapes and surgical glue       Social History  Social History     Socioeconomic History     Marital status:      Spouse name: Not on file     Number of children: 1     Years of education: Not on file     Highest education level: Not on file   Occupational History     Occupation: homemaker     Employer: NONE      Occupation: teacher   Social Needs     Financial resource strain: Not on file     Food insecurity     Worry: Not on file     Inability: Not on file     Transportation needs     Medical: Not on file     Non-medical: Not on file   Tobacco Use     Smoking status: Never Smoker     Smokeless tobacco: Never Used   Substance and Sexual Activity     Alcohol use: No     Alcohol/week: 0.0 standard drinks     Drug use: No     Sexual activity: Yes     Partners: Male     Birth control/protection: Post-menopausal   Lifestyle     Physical activity     Days per week: Not on file     Minutes per session: Not on file     Stress: Not on file   Relationships     Social connections     Talks on phone: Not on file     Gets together: Not on file     Attends Scientologist service: Not on file     Active member of club or organization: Not on file     Attends meetings of clubs or organizations: Not on file     Relationship status: Not on file     Intimate partner violence     Fear of current or ex partner: Not on file     Emotionally abused: Not on file     Physically abused: Not on file     Forced sexual activity: Not on file   Other Topics Concern     Parent/sibling w/ CABG, MI or angioplasty before 65F 55M? Not Asked   Social History Narrative     Not on file       Family History  Family History   Problem Relation Age of Onset     Pancreatic Cancer Mother      Depression Sister      Depression Father      Lymphoma Father      No Known  Problems Brother      Osteoporosis Maternal Grandmother      Deep Vein Thrombosis No family hx of      Anesthesia Reaction No family hx of            ROS/MED HISTORY OF  The complete review of systems is negative other than noted in the HPI or here.   ENT/Pulmonary:     (+) allergic rhinitis,  (-) ERENDIRA risk factors and recent URI   Neurologic: Comment: RLS - neg neurologic ROS   (+) migraines,     Cardiovascular:  - neg cardiovascular ROS     METS/Exercise Tolerance: >4 METS    Hematologic:  - neg hematologic  ROS  (-) history of blood clots and history of blood transfusion   Musculoskeletal: Comment: fibromyalgia      GI/Hepatic: Comment: Sludge in gallbladder      Renal/Genitourinary: Comment: interstim in place for IC      Endo:     (+) thyroid problem, hypothyroidism,     Psychiatric/Substance Use:     (+) psychiatric history anxiety and depression     Infectious Disease:  - neg infectious disease ROS  (-) Recent Fever   Malignancy:  - neg malignancy ROS     Other:  - neg other ROS   (-) Any chance pregnant                             0 lbs 0 oz  Data Unavailable   There is no height or weight on file to calculate BMI.       Physical Exam  Constitutional: Awake, alert, cooperative, no apparent distress, and appears stated age.  Neurologic: Awake, alert, oriented to name, place and time.   Neuropsychiatric: Calm, cooperative. Normal affect.     Labs: (personally reviewed)   Component      Latest Ref Rng & Units 3/12/2021   Sodium      133 - 144 mmol/L 139   Potassium      3.4 - 5.3 mmol/L 4.1   Chloride      94 - 109 mmol/L 107   Carbon Dioxide      20 - 32 mmol/L 29   Anion Gap      3 - 14 mmol/L 3   Glucose      70 - 99 mg/dL 74   Urea Nitrogen      7 - 30 mg/dL 16   Creatinine      0.52 - 1.04 mg/dL 0.91   GFR Estimate      >60 mL/min/1.73:m2 71   GFR Estimate If Black      >60 mL/min/1.73:m2 82   Calcium      8.5 - 10.1 mg/dL 8.8   Bilirubin Total      0.2 - 1.3 mg/dL 0.5   Albumin      3.4 - 5.0 g/dL 4.1    Protein Total      6.8 - 8.8 g/dL 7.4   Alkaline Phosphatase      40 - 150 U/L 96   ALT      0 - 50 U/L 35   AST      0 - 45 U/L 23   WBC      4.0 - 11.0 10e9/L 4.5   RBC Count      3.8 - 5.2 10e12/L 4.30   Hemoglobin      11.7 - 15.7 g/dL 13.5   Hematocrit      35.0 - 47.0 % 41.4   MCV      78 - 100 fl 96   MCH      26.5 - 33.0 pg 31.4   MCHC      31.5 - 36.5 g/dL 32.6   RDW      10.0 - 15.0 % 13.7   Platelet Count      150 - 450 10e9/L 168             Outside records reviewed from:   Care Everywhere        ASSESSMENT and PLAN  Vero Lua 55 year old female scheduled for a CHOLECYSTECTOMY, LAPAROSCOPIC on 3/24/21 by  in treatment of sludge in gallbladder.  PAC referral for risk assessment and optimization for anesthesia with comorbid conditions of: allergic rhinitis, fibromyalgia, migraines, RLS, hypothyroidism, OAB (interstim in place), anxiety, depression and insomnia.     Pre-operative considerations:  1.  Cardiac:  Functional status- METS >4.  She reports that she hasn't been very active over the winter, but in the fall was walking 20 minutes at a time for exercise.  She has no known cardiac conditions.  Intermediate risk surgery with 0.4% risk of major adverse cardiac event.   2.  Pulm:  Airway feasible.  ERENDIRA risk: low.    3.  GI:  Risk of PONV score = 3.  If > 2, anti-emetic intervention recommended.  4. :  Interstim device in place for interstitial cystitis.  Instructed to bring remote.  5. Musculoskeletal:  She has chronic generalized muscle pain due to fibromyalgia.  Consider cautious positioning.  6. Neuro: she gets botox injections for migraine management.  Hold maxalt 24 hours prior to surgery.      VTE risk: 0.26%    Virtual visit - Please refer to the physical examination documented by the anesthesiologist in the anesthesia record on the day of surgery      Patient is optimized and is acceptable candidate for the proposed procedure.  No further diagnostic evaluation is needed.        Prep time: 10 minutes  Visit time: 16 minutes  Documentation: 20 minutes  ------------------------------------------  Total time spent DOS = 46 minutes      Ayde Leon DNP, RN, APRN  Preoperative Assessment Center  [facility]  Phone: [phone]  Fax: [phone]

## 2021-03-12 NOTE — H&P
Pre-Operative H & P         Video-Visit Details    Type of service:  Video Visit    Patient verbally consented to video service today: YES      Video Start Time: 1256  Video End Time (time video stopped): 1312    Originating Location (pt. Location): Other work    Distant Location (provider location):  [facility]     Mode of Communication:  Video Conference via [employer]      CC:  Preoperative exam to assess for increased cardiopulmonary risk while undergoing surgery and anesthesia.    Date of Encounter: 3/12/2021  Primary Care Physician:  Emile Jara  Associated diagnosis: sludge in gallbladder    HPI  Vero Lua is a 55 year old female who presents for pre-operative H & P in preparation for a CHOLECYSTECTOMY, LAPAROSCOPIC on 3/24/21 by  at [facility].     Vero Lua 55 year old female with allergic rhinitis, fibromyalgia, migraines, RLS, hypothyroidism, OAB (interstim in place), anxiety, depression and insomnia that has sludge in gallbladder.  The patient has been having abdominal pain off and on with some radiation to the right back since May 2020.  She has noted that the pain has been worse with fatty foods.  She has had multiple imaging studies done which indicated the sludge in the gallbladder and a lower than normal gallbladder ejection fracture.  She was subsequently referred to  for surgical consideration and the above listed procedure has been recommended for treatment.    History is obtained from the patient and the medical record.     Past Medical History  Past Medical History:   Diagnosis Date     Adenomatous polyp of colon 05/2016    fu 5 years, mn gi     Anxiety      Chronic insomnia     on ambien and klonopin for over 10 years together     Depression, major, in partial remission (H)     was seeing psyche until 2015, not since then     Dilated bile duct 2020    extrahepatic, seen on ct for ruq pain, then eus  done and no stricture seen     Fibromyalgia 1998     Hypothyroid 90's     Interstitial cystitis     urol [place] and had interstem     Migraines     Dr. Cash of neuro, got botox 2012 via [facility] Dr. Kevin, now Dr. Watkins     Restless leg syndrome      Urgency of urination     interstim       Past Surgical History  Past Surgical History:   Procedure Laterality Date     COLONOSCOPY       ENDOSCOPIC ULTRASOUND UPPER GASTROINTESTINAL TRACT (GI) N/A 7/20/2020    Procedure: DIAGNOSTIC ENDOSCOPIC ULTRASOUND, ESOPHAGOSCOPY / UPPER GASTROINTESTINAL TRACT;  Surgeon: Juan Hurst MD;  Location: [facility] OR     interstim procedure  2015, 2010 and 2008    [facility]       Hx of Blood transfusions/reactions: none    Hx of abnormal bleeding or anti-platelet use: none    Menstrual history: Patient's last menstrual period was 06/27/2012.:     Steroid use in the last year: none    Personal or FH with difficulty with Anesthesia:  none    Prior to Admission Medications  Current Outpatient Medications   Medication Sig Dispense Refill     BOTOX 200 units injection        buPROPion (WELLBUTRIN XL) 150 MG 24 hr tablet every morning        clonazePAM (KLONOPIN) 1 MG tablet Take 1 tablet (1 mg) by mouth At Bedtime 90 tablet 0     ibuprofen (ADVIL/MOTRIN) 200 MG tablet Take 200 mg by mouth every 4 hours as needed for mild pain       levothyroxine (SYNTHROID/LEVOTHROID) 100 MCG tablet Take 1 tablet (100 mcg) by mouth daily (Patient taking differently: Take 100 mcg by mouth every morning ) 90 tablet 1     loratadine (CLARITIN) 10 MG tablet Take 10 mg by mouth daily as needed  30 tablet 1     rizatriptan (MAXALT) 10 MG tablet at onset of headache        topiramate (TOPAMAX) 200 MG tablet Take 200 mg by mouth At Bedtime        vortioxetine (BRINTELLIX) 10 MG tablet Take 1 tablet (10 mg) by mouth daily (Patient taking differently: Take 10 mg by mouth At Bedtime ) 90 tablet 3     zolpidem (AMBIEN) 5 MG tablet TAKE 1 TABLET BY MOUTH AT BEDTIME  AS NEEDED (Patient taking differently: nightly as needed TAKE 1 TABLET BY MOUTH AT BEDTIME AS NEEDED) 90 tablet 0       Allergies  Allergies   Allergen Reactions     Adhesive Tape Rash     Some tapes and surgical glue       Social History  Social History     Socioeconomic History     Marital status:      Spouse name: Not on file     Number of children: 1     Years of education: Not on file     Highest education level: Not on file   Occupational History     Occupation: homemaker     Employer: NONE      Occupation: teacher   Social Needs     Financial resource strain: Not on file     Food insecurity     Worry: Not on file     Inability: Not on file     Transportation needs     Medical: Not on file     Non-medical: Not on file   Tobacco Use     Smoking status: Never Smoker     Smokeless tobacco: Never Used   Substance and Sexual Activity     Alcohol use: No     Alcohol/week: 0.0 standard drinks     Drug use: No     Sexual activity: Yes     Partners: Male     Birth control/protection: Post-menopausal   Lifestyle     Physical activity     Days per week: Not on file     Minutes per session: Not on file     Stress: Not on file   Relationships     Social connections     Talks on phone: Not on file     Gets together: Not on file     Attends Caodaism service: Not on file     Active member of club or organization: Not on file     Attends meetings of clubs or organizations: Not on file     Relationship status: Not on file     Intimate partner violence     Fear of current or ex partner: Not on file     Emotionally abused: Not on file     Physically abused: Not on file     Forced sexual activity: Not on file   Other Topics Concern     Parent/sibling w/ CABG, MI or angioplasty before 65F 55M? Not Asked   Social History Narrative     Not on file       Family History  Family History   Problem Relation Age of Onset     Pancreatic Cancer Mother      Depression Sister      Depression Father      Lymphoma Father      No Known  Problems Brother      Osteoporosis Maternal Grandmother      Deep Vein Thrombosis No family hx of      Anesthesia Reaction No family hx of            ROS/MED HISTORY OF  The complete review of systems is negative other than noted in the HPI or here.   ENT/Pulmonary:     (+) allergic rhinitis,  (-) ERENDIRA risk factors and recent URI   Neurologic: Comment: RLS - neg neurologic ROS   (+) migraines,     Cardiovascular:  - neg cardiovascular ROS     METS/Exercise Tolerance: >4 METS    Hematologic:  - neg hematologic  ROS  (-) history of blood clots and history of blood transfusion   Musculoskeletal: Comment: fibromyalgia      GI/Hepatic: Comment: Sludge in gallbladder      Renal/Genitourinary: Comment: interstim in place for IC      Endo:     (+) thyroid problem, hypothyroidism,     Psychiatric/Substance Use:     (+) psychiatric history anxiety and depression     Infectious Disease:  - neg infectious disease ROS  (-) Recent Fever   Malignancy:  - neg malignancy ROS     Other:  - neg other ROS   (-) Any chance pregnant                             0 lbs 0 oz  Data Unavailable   There is no height or weight on file to calculate BMI.       Physical Exam  Constitutional: Awake, alert, cooperative, no apparent distress, and appears stated age.  Neurologic: Awake, alert, oriented to name, place and time.   Neuropsychiatric: Calm, cooperative. Normal affect.     Labs: (personally reviewed)   Component      Latest Ref Rng & Units 3/12/2021   Sodium      133 - 144 mmol/L 139   Potassium      3.4 - 5.3 mmol/L 4.1   Chloride      94 - 109 mmol/L 107   Carbon Dioxide      20 - 32 mmol/L 29   Anion Gap      3 - 14 mmol/L 3   Glucose      70 - 99 mg/dL 74   Urea Nitrogen      7 - 30 mg/dL 16   Creatinine      0.52 - 1.04 mg/dL 0.91   GFR Estimate      >60 mL/min/1.73:m2 71   GFR Estimate If Black      >60 mL/min/1.73:m2 82   Calcium      8.5 - 10.1 mg/dL 8.8   Bilirubin Total      0.2 - 1.3 mg/dL 0.5   Albumin      3.4 - 5.0 g/dL 4.1    Protein Total      6.8 - 8.8 g/dL 7.4   Alkaline Phosphatase      40 - 150 U/L 96   ALT      0 - 50 U/L 35   AST      0 - 45 U/L 23   WBC      4.0 - 11.0 10e9/L 4.5   RBC Count      3.8 - 5.2 10e12/L 4.30   Hemoglobin      11.7 - 15.7 g/dL 13.5   Hematocrit      35.0 - 47.0 % 41.4   MCV      78 - 100 fl 96   MCH      26.5 - 33.0 pg 31.4   MCHC      31.5 - 36.5 g/dL 32.6   RDW      10.0 - 15.0 % 13.7   Platelet Count      150 - 450 10e9/L 168             Outside records reviewed from:   Care Everywhere        ASSESSMENT and PLAN  Vero Lua 55 year old female scheduled for a CHOLECYSTECTOMY, LAPAROSCOPIC on 3/24/21 by  in treatment of sludge in gallbladder.  PAC referral for risk assessment and optimization for anesthesia with comorbid conditions of: allergic rhinitis, fibromyalgia, migraines, RLS, hypothyroidism, OAB (interstim in place), anxiety, depression and insomnia.     Pre-operative considerations:  1.  Cardiac:  Functional status- METS >4.  She reports that she hasn't been very active over the winter, but in the fall was walking 20 minutes at a time for exercise.  She has no known cardiac conditions.  Intermediate risk surgery with 0.4% risk of major adverse cardiac event.   2.  Pulm:  Airway feasible.  ERENDIRA risk: low.    3.  GI:  Risk of PONV score = 3.  If > 2, anti-emetic intervention recommended.  4. :  Interstim device in place for interstitial cystitis.  Instructed to bring remote.  5. Musculoskeletal:  She has chronic generalized muscle pain due to fibromyalgia.  Consider cautious positioning.  6. Neuro: she gets botox injections for migraine management.  Hold maxalt 24 hours prior to surgery.      VTE risk: 0.26%    Virtual visit - Please refer to the physical examination documented by the anesthesiologist in the anesthesia record on the day of surgery      Patient is optimized and is acceptable candidate for the proposed procedure.  No further diagnostic evaluation is needed.        Prep time: 10 minutes  Visit time: 16 minutes  Documentation: 20 minutes  ------------------------------------------  Total time spent DOS = 46 minutes      Ayde Leon DNP, RN, APRN  Preoperative Assessment Center  [facility]  Phone: [phone]  Fax: [phone]

## 2021-03-12 NOTE — ANESTHESIA PREPROCEDURE EVALUATION
Anesthesia Pre-Procedure Evaluation    Patient: Vero Lua   MRN: [id number] : 1965        Preoperative Diagnosis: Sludge in gallbladder [K82.8]  Abnormal finding on GI tract imaging [R93.3]   Procedure : Procedure(s):  CHOLECYSTECTOMY, LAPAROSCOPIC     Past Medical History:   Diagnosis Date     Adenomatous polyp of colon 2016    fu 5 years, mn gi     Anxiety      Chronic insomnia     on ambien and klonopin for over 10 years together     Depression, major, in partial remission (H)     was seeing psyche until , not since then     Dilated bile duct     extrahepatic, seen on ct for ruq pain, then eus done and no stricture seen     Fibromyalgia      Hypothyroid      Interstitial cystitis     urol [place] and had interstem     Migraines     Dr. Cash of neuro, got botox  via [facility] Dr. Kevin, now Dr. Watkins     Restless leg syndrome      Urgency of urination     interstim      Past Surgical History:   Procedure Laterality Date     COLONOSCOPY       ENDOSCOPIC ULTRASOUND UPPER GASTROINTESTINAL TRACT (GI) N/A 2020    Procedure: DIAGNOSTIC ENDOSCOPIC ULTRASOUND, ESOPHAGOSCOPY / UPPER GASTROINTESTINAL TRACT;  Surgeon: Juan Hurst MD;  Location: [facility] OR     interstim procedure  ,  and     [facility]      Allergies   Allergen Reactions     Adhesive Tape Rash     Some tapes and surgical glue      Social History     Tobacco Use     Smoking status: Never Smoker     Smokeless tobacco: Never Used   Substance Use Topics     Alcohol use: No     Alcohol/week: 0.0 standard drinks      Wt Readings from Last 1 Encounters:   10/22/20 79.4 kg (175 lb)        Anesthesia Evaluation   Pt has had prior anesthetic. Type: MAC.    No history of anesthetic complications       ROS/MED HX  ENT/Pulmonary:     (+) allergic rhinitis,  (-) ERENDIRA risk factors and recent URI   Neurologic: Comment: RLS - neg neurologic ROS   (+) migraines,     Cardiovascular:  - neg cardiovascular ROS      METS/Exercise Tolerance: >4 METS    Hematologic:  - neg hematologic  ROS  (-) history of blood clots and history of blood transfusion   Musculoskeletal: Comment: fibromyalgia      GI/Hepatic: Comment: Sludge in gallbladder      Renal/Genitourinary: Comment: interstim in place for IC      Endo:     (+) thyroid problem, hypothyroidism,     Psychiatric/Substance Use:     (+) psychiatric history anxiety and depression     Infectious Disease:  - neg infectious disease ROS  (-) Recent Fever   Malignancy:  - neg malignancy ROS     Other:  - neg other ROS   (-) Any chance pregnant       Physical Exam    Airway  airway exam normal           Respiratory Devices and Support         Dental  no notable dental history         Cardiovascular   cardiovascular exam normal          Pulmonary   pulmonary exam normal                OUTSIDE LABS:  CBC:   Lab Results   Component Value Date    WBC 4.3 05/22/2020    WBC 4.2 09/09/2019    HGB 13.2 07/15/2020    HGB 13.6 05/22/2020    HCT 43.1 05/22/2020    HCT 42.4 09/09/2019     05/22/2020     09/09/2019     BMP:   Lab Results   Component Value Date     09/09/2019     05/17/2016    POTASSIUM 4.0 09/09/2019    POTASSIUM 3.8 05/17/2016    CHLORIDE 108 09/09/2019    CHLORIDE 108 05/17/2016    CO2 27 09/09/2019    CO2 27 05/17/2016    BUN 16 09/09/2019    BUN 15 05/17/2016    CR 0.90 09/09/2019    CR 0.77 05/17/2016    GLC 85 09/09/2019    GLC 86 05/17/2016     COAGS: No results found for: PTT, INR, FIBR  POC: No results found for: BGM, HCG, HCGS  HEPATIC:   Lab Results   Component Value Date    ALBUMIN 3.8 05/22/2020    PROTTOTAL 7.1 05/22/2020    ALT 33 05/22/2020    AST 24 05/22/2020    ALKPHOS 73 05/22/2020    BILITOTAL 0.5 05/22/2020     OTHER:   Lab Results   Component Value Date    RAIMUNDO 8.6 09/09/2019    TSH 2.64 05/22/2020    T4 1.07 04/02/2018       Anesthesia Plan    ASA Status:  2   NPO Status:  NPO Appropriate    Anesthesia Type: General.     - Airway:  ETT   Induction: Intravenous.   Maintenance: Balanced.        Consents    Anesthesia Plan(s) and associated risks, benefits, and realistic alternatives discussed. Questions answered and patient/representative(s) expressed understanding.     - Discussed with:  Patient      - Extended Intubation/Ventilatory Support Discussed: No.      - Patient is DNR/DNI Status: No    Use of blood products discussed: No .     Postoperative Care    Pain management: IV analgesics, Multi-modal analgesia.   PONV prophylaxis: Ondansetron (or other 5HT-3), Dexamethasone or Solumedrol     Comments:              PAC Discussion and Assessment    ASA Classification: 2  Case is suitable for: ASC  Anesthetic techniques and relevant risks discussed: GA                  PAC Resident/NP Anesthesia Assessment: Vero Aquinoonnor 55 year old female scheduled for a CHOLECYSTECTOMY, LAPAROSCOPIC on 3/24/21 by  in treatment of sludge in gallbladder.  PAC referral for risk assessment and optimization for anesthesia with comorbid conditions of: allergic rhinitis, fibromyalgia, migraines, RLS, hypothyroidism, OAB (interstim in place), anxiety, depression and insomnia.     Pre-operative considerations:  1.  Cardiac:  Functional status- METS >4.  She reports that she hasn't been very active over the winter, but in the fall was walking 20 minutes at a time for exercise.  She has no known cardiac conditions.  Intermediate risk surgery with 0.4% risk of major adverse cardiac event.   2.  Pulm:  Airway feasible.  ERENDIRA risk: low.    3.  GI:  Risk of PONV score = 3.  If > 2, anti-emetic intervention recommended.  4. :  Interstim device in place for interstitial cystitis.  Instructed to bring remote.  5. Musculoskeletal:  She has chronic generalized muscle pain due to fibromyalgia.  Consider cautious positioning.  6. Neuro: she gets botox injections for migraine management.  Hold maxalt 24 hours prior to surgery.      VTE risk: 0.26%    Virtual visit -  Please refer to the physical examination documented by the anesthesiologist in the anesthesia record on the day of surgery      Patient is optimized and is acceptable candidate for the proposed procedure.  No further diagnostic evaluation is needed.           Ayde Leon DNP, RN, APRN      Reviewed and Signed by PAC Mid-Level Provider/Resident  Mid-Level Provider/Resident: Ayde Leon DNP, RN, APRN  Date: 3/12/21  Time: 1505                               WILBER Guerra CNP       Ganesh Sen MD  Staff Anesthesiologist  [phone]

## 2021-03-12 NOTE — PROGRESS NOTES
Vero is a 55 year old who is being evaluated via a billable video visit.      How would you like to obtain your AVS? MyChart    Will anyone else be joining your video visit? No    HPI           Review of Systems         Objective    Vitals - Patient Reported  Pain Score: Mild Pain (2)        Physical Exam     CHANDLER Griffiths LPN

## 2021-03-13 LAB
ALBUMIN SERPL-MCNC: 4.1 G/DL (ref 3.4–5)
ALP SERPL-CCNC: 96 U/L (ref 40–150)
ALT SERPL W P-5'-P-CCNC: 35 U/L (ref 0–50)
ANION GAP SERPL CALCULATED.3IONS-SCNC: 3 MMOL/L (ref 3–14)
AST SERPL W P-5'-P-CCNC: 23 U/L (ref 0–45)
BILIRUB SERPL-MCNC: 0.5 MG/DL (ref 0.2–1.3)
BUN SERPL-MCNC: 16 MG/DL (ref 7–30)
CALCIUM SERPL-MCNC: 8.8 MG/DL (ref 8.5–10.1)
CHLORIDE SERPL-SCNC: 107 MMOL/L (ref 94–109)
CO2 SERPL-SCNC: 29 MMOL/L (ref 20–32)
CREAT SERPL-MCNC: 0.91 MG/DL (ref 0.52–1.04)
GFR SERPL CREATININE-BSD FRML MDRD: 71 ML/MIN/{1.73_M2}
GLUCOSE SERPL-MCNC: 74 MG/DL (ref 70–99)
POTASSIUM SERPL-SCNC: 4.1 MMOL/L (ref 3.4–5.3)
PROT SERPL-MCNC: 7.4 G/DL (ref 6.8–8.8)
SODIUM SERPL-SCNC: 139 MMOL/L (ref 133–144)
TSH SERPL DL<=0.005 MIU/L-ACNC: 2 MU/L (ref 0.4–4)

## 2021-03-14 NOTE — RESULT ENCOUNTER NOTE
Your labs look super.  The thyroid level appears to be fine and your blood count, sugar, kidney tests, liver tests and proteins are all normal.    I am not sure what is causing your symptoms.  How about if we give it a few more weeks and if you are not improving let me know?    Emile Jara M.D.

## 2021-03-20 DIAGNOSIS — Z11.59 ENCOUNTER FOR SCREENING FOR OTHER VIRAL DISEASES: ICD-10-CM

## 2021-03-20 LAB
SARS-COV-2 RNA RESP QL NAA+PROBE: NORMAL
SPECIMEN SOURCE: NORMAL

## 2021-03-20 PROCEDURE — U0003 INFECTIOUS AGENT DETECTION BY NUCLEIC ACID (DNA OR RNA); SEVERE ACUTE RESPIRATORY SYNDROME CORONAVIRUS 2 (SARS-COV-2) (CORONAVIRUS DISEASE [COVID-19]), AMPLIFIED PROBE TECHNIQUE, MAKING USE OF HIGH THROUGHPUT TECHNOLOGIES AS DESCRIBED BY CMS-2020-01-R: HCPCS | Performed by: SURGERY

## 2021-03-20 PROCEDURE — U0005 INFEC AGEN DETEC AMPLI PROBE: HCPCS | Performed by: SURGERY

## 2021-03-21 LAB
LABORATORY COMMENT REPORT: NORMAL
SARS-COV-2 RNA RESP QL NAA+PROBE: NEGATIVE
SPECIMEN SOURCE: NORMAL

## 2021-03-24 ENCOUNTER — ANESTHESIA (OUTPATIENT)
Dept: SURGERY | Facility: AMBULATORY SURGERY CENTER | Age: 56
End: 2021-03-24

## 2021-03-24 ENCOUNTER — HOSPITAL ENCOUNTER (OUTPATIENT)
Facility: AMBULATORY SURGERY CENTER | Age: 56
Discharge: HOME | End: 2021-03-24
Attending: SURGERY | Admitting: SURGERY
Payer: COMMERCIAL

## 2021-03-24 VITALS
DIASTOLIC BLOOD PRESSURE: 65 MMHG | HEART RATE: 59 BPM | OXYGEN SATURATION: 100 % | WEIGHT: 180 LBS | TEMPERATURE: 97 F | HEIGHT: 68 IN | SYSTOLIC BLOOD PRESSURE: 106 MMHG | RESPIRATION RATE: 16 BRPM | BODY MASS INDEX: 27.28 KG/M2

## 2021-03-24 DIAGNOSIS — R93.3 ABNORMAL FINDING ON GI TRACT IMAGING: ICD-10-CM

## 2021-03-24 DIAGNOSIS — K82.8 SLUDGE IN GALLBLADDER: ICD-10-CM

## 2021-03-24 PROCEDURE — 47562 LAPAROSCOPIC CHOLECYSTECTOMY: CPT

## 2021-03-24 PROCEDURE — 88304 TISSUE EXAM BY PATHOLOGIST: CPT | Performed by: PATHOLOGY

## 2021-03-24 RX ORDER — KETOROLAC TROMETHAMINE 30 MG/ML
INJECTION, SOLUTION INTRAMUSCULAR; INTRAVENOUS PRN
Status: DISCONTINUED | OUTPATIENT
Start: 2021-03-24 | End: 2021-03-24

## 2021-03-24 RX ORDER — BUPIVACAINE HYDROCHLORIDE 2.5 MG/ML
INJECTION, SOLUTION INFILTRATION; PERINEURAL PRN
Status: DISCONTINUED | OUTPATIENT
Start: 2021-03-24 | End: 2021-03-24 | Stop reason: HOSPADM

## 2021-03-24 RX ORDER — NALOXONE HYDROCHLORIDE 0.4 MG/ML
0.4 INJECTION, SOLUTION INTRAMUSCULAR; INTRAVENOUS; SUBCUTANEOUS
Status: DISCONTINUED | OUTPATIENT
Start: 2021-03-24 | End: 2021-03-25 | Stop reason: HOSPADM

## 2021-03-24 RX ORDER — PROPOFOL 10 MG/ML
INJECTION, EMULSION INTRAVENOUS PRN
Status: DISCONTINUED | OUTPATIENT
Start: 2021-03-24 | End: 2021-03-24

## 2021-03-24 RX ORDER — DEXAMETHASONE SODIUM PHOSPHATE 4 MG/ML
INJECTION, SOLUTION INTRA-ARTICULAR; INTRALESIONAL; INTRAMUSCULAR; INTRAVENOUS; SOFT TISSUE PRN
Status: DISCONTINUED | OUTPATIENT
Start: 2021-03-24 | End: 2021-03-24

## 2021-03-24 RX ORDER — SODIUM CHLORIDE, SODIUM LACTATE, POTASSIUM CHLORIDE, CALCIUM CHLORIDE 600; 310; 30; 20 MG/100ML; MG/100ML; MG/100ML; MG/100ML
INJECTION, SOLUTION INTRAVENOUS CONTINUOUS
Status: DISCONTINUED | OUTPATIENT
Start: 2021-03-24 | End: 2021-03-25 | Stop reason: HOSPADM

## 2021-03-24 RX ORDER — ONDANSETRON 4 MG/1
4 TABLET, ORALLY DISINTEGRATING ORAL EVERY 30 MIN PRN
Status: DISCONTINUED | OUTPATIENT
Start: 2021-03-24 | End: 2021-03-25 | Stop reason: HOSPADM

## 2021-03-24 RX ORDER — MEPERIDINE HYDROCHLORIDE 25 MG/ML
12.5 INJECTION INTRAMUSCULAR; INTRAVENOUS; SUBCUTANEOUS
Status: DISCONTINUED | OUTPATIENT
Start: 2021-03-24 | End: 2021-03-25 | Stop reason: HOSPADM

## 2021-03-24 RX ORDER — LIDOCAINE 40 MG/G
CREAM TOPICAL
Status: DISCONTINUED | OUTPATIENT
Start: 2021-03-24 | End: 2021-03-24 | Stop reason: HOSPADM

## 2021-03-24 RX ORDER — OXYCODONE HYDROCHLORIDE 5 MG/1
5-10 TABLET ORAL EVERY 4 HOURS PRN
Qty: 12 TABLET | Refills: 0 | Status: SHIPPED | OUTPATIENT
Start: 2021-03-24 | End: 2021-08-20

## 2021-03-24 RX ORDER — FENTANYL CITRATE 50 UG/ML
25-50 INJECTION, SOLUTION INTRAMUSCULAR; INTRAVENOUS
Status: DISCONTINUED | OUTPATIENT
Start: 2021-03-24 | End: 2021-03-25 | Stop reason: HOSPADM

## 2021-03-24 RX ORDER — ONDANSETRON 2 MG/ML
4 INJECTION INTRAMUSCULAR; INTRAVENOUS EVERY 30 MIN PRN
Status: DISCONTINUED | OUTPATIENT
Start: 2021-03-24 | End: 2021-03-25 | Stop reason: HOSPADM

## 2021-03-24 RX ORDER — ALBUTEROL SULFATE 0.83 MG/ML
2.5 SOLUTION RESPIRATORY (INHALATION) EVERY 4 HOURS PRN
Status: DISCONTINUED | OUTPATIENT
Start: 2021-03-24 | End: 2021-03-25 | Stop reason: HOSPADM

## 2021-03-24 RX ORDER — ONDANSETRON 2 MG/ML
INJECTION INTRAMUSCULAR; INTRAVENOUS PRN
Status: DISCONTINUED | OUTPATIENT
Start: 2021-03-24 | End: 2021-03-24

## 2021-03-24 RX ORDER — KETOROLAC TROMETHAMINE 30 MG/ML
30 INJECTION, SOLUTION INTRAMUSCULAR; INTRAVENOUS EVERY 6 HOURS PRN
Status: DISCONTINUED | OUTPATIENT
Start: 2021-03-24 | End: 2021-03-25 | Stop reason: HOSPADM

## 2021-03-24 RX ORDER — GABAPENTIN 300 MG/1
300 CAPSULE ORAL ONCE
Status: COMPLETED | OUTPATIENT
Start: 2021-03-24 | End: 2021-03-24

## 2021-03-24 RX ORDER — PROPOFOL 10 MG/ML
INJECTION, EMULSION INTRAVENOUS CONTINUOUS PRN
Status: DISCONTINUED | OUTPATIENT
Start: 2021-03-24 | End: 2021-03-24

## 2021-03-24 RX ORDER — NALOXONE HYDROCHLORIDE 0.4 MG/ML
0.2 INJECTION, SOLUTION INTRAMUSCULAR; INTRAVENOUS; SUBCUTANEOUS
Status: DISCONTINUED | OUTPATIENT
Start: 2021-03-24 | End: 2021-03-25 | Stop reason: HOSPADM

## 2021-03-24 RX ORDER — CEFAZOLIN SODIUM 1 G/50ML
1 SOLUTION INTRAVENOUS SEE ADMIN INSTRUCTIONS
Status: DISCONTINUED | OUTPATIENT
Start: 2021-03-24 | End: 2021-03-24 | Stop reason: HOSPADM

## 2021-03-24 RX ORDER — OXYCODONE HYDROCHLORIDE 5 MG/1
5 TABLET ORAL
Status: COMPLETED | OUTPATIENT
Start: 2021-03-24 | End: 2021-03-24

## 2021-03-24 RX ORDER — ACETAMINOPHEN 325 MG/1
650 TABLET ORAL EVERY 4 HOURS PRN
Qty: 50 TABLET | Refills: 0 | Status: SHIPPED | OUTPATIENT
Start: 2021-03-24 | End: 2021-08-20

## 2021-03-24 RX ORDER — ACETAMINOPHEN 325 MG/1
650 TABLET ORAL
Status: DISCONTINUED | OUTPATIENT
Start: 2021-03-24 | End: 2021-03-25 | Stop reason: HOSPADM

## 2021-03-24 RX ORDER — CEFAZOLIN SODIUM 2 G/50ML
2 SOLUTION INTRAVENOUS
Status: COMPLETED | OUTPATIENT
Start: 2021-03-24 | End: 2021-03-24

## 2021-03-24 RX ORDER — LIDOCAINE HYDROCHLORIDE 20 MG/ML
INJECTION, SOLUTION INFILTRATION; PERINEURAL PRN
Status: DISCONTINUED | OUTPATIENT
Start: 2021-03-24 | End: 2021-03-24

## 2021-03-24 RX ORDER — FENTANYL CITRATE 50 UG/ML
INJECTION, SOLUTION INTRAMUSCULAR; INTRAVENOUS PRN
Status: DISCONTINUED | OUTPATIENT
Start: 2021-03-24 | End: 2021-03-24

## 2021-03-24 RX ORDER — OXYCODONE HYDROCHLORIDE 5 MG/1
5 TABLET ORAL EVERY 4 HOURS PRN
Status: DISCONTINUED | OUTPATIENT
Start: 2021-03-24 | End: 2021-03-25 | Stop reason: HOSPADM

## 2021-03-24 RX ORDER — ACETAMINOPHEN 325 MG/1
975 TABLET ORAL ONCE
Status: COMPLETED | OUTPATIENT
Start: 2021-03-24 | End: 2021-03-24

## 2021-03-24 RX ORDER — SODIUM CHLORIDE, SODIUM LACTATE, POTASSIUM CHLORIDE, CALCIUM CHLORIDE 600; 310; 30; 20 MG/100ML; MG/100ML; MG/100ML; MG/100ML
INJECTION, SOLUTION INTRAVENOUS CONTINUOUS
Status: DISCONTINUED | OUTPATIENT
Start: 2021-03-24 | End: 2021-03-24 | Stop reason: HOSPADM

## 2021-03-24 RX ADMIN — LIDOCAINE HYDROCHLORIDE 5 MG: 20 INJECTION, SOLUTION INFILTRATION; PERINEURAL at 07:27

## 2021-03-24 RX ADMIN — Medication 50 MG: at 07:28

## 2021-03-24 RX ADMIN — GABAPENTIN 300 MG: 300 CAPSULE ORAL at 06:27

## 2021-03-24 RX ADMIN — CEFAZOLIN SODIUM 2 G: 2 SOLUTION INTRAVENOUS at 07:37

## 2021-03-24 RX ADMIN — PROPOFOL 200 MG: 10 INJECTION, EMULSION INTRAVENOUS at 07:28

## 2021-03-24 RX ADMIN — ACETAMINOPHEN 975 MG: 325 TABLET ORAL at 06:27

## 2021-03-24 RX ADMIN — PROPOFOL 200 MCG/KG/MIN: 10 INJECTION, EMULSION INTRAVENOUS at 07:28

## 2021-03-24 RX ADMIN — SODIUM CHLORIDE, SODIUM LACTATE, POTASSIUM CHLORIDE, CALCIUM CHLORIDE: 600; 310; 30; 20 INJECTION, SOLUTION INTRAVENOUS at 06:36

## 2021-03-24 RX ADMIN — FENTANYL CITRATE 25 MCG: 50 INJECTION, SOLUTION INTRAMUSCULAR; INTRAVENOUS at 09:02

## 2021-03-24 RX ADMIN — ONDANSETRON 4 MG: 2 INJECTION INTRAMUSCULAR; INTRAVENOUS at 07:33

## 2021-03-24 RX ADMIN — DEXAMETHASONE SODIUM PHOSPHATE 4 MG: 4 INJECTION, SOLUTION INTRA-ARTICULAR; INTRALESIONAL; INTRAMUSCULAR; INTRAVENOUS; SOFT TISSUE at 07:33

## 2021-03-24 RX ADMIN — FENTANYL CITRATE 25 MCG: 50 INJECTION, SOLUTION INTRAMUSCULAR; INTRAVENOUS at 08:58

## 2021-03-24 RX ADMIN — OXYCODONE HYDROCHLORIDE 5 MG: 5 TABLET ORAL at 08:45

## 2021-03-24 RX ADMIN — KETOROLAC TROMETHAMINE 30 MG: 30 INJECTION, SOLUTION INTRAMUSCULAR; INTRAVENOUS at 08:10

## 2021-03-24 RX ADMIN — FENTANYL CITRATE 50 MCG: 50 INJECTION, SOLUTION INTRAMUSCULAR; INTRAVENOUS at 07:27

## 2021-03-24 ASSESSMENT — MIFFLIN-ST. JEOR: SCORE: 1459.97

## 2021-03-24 NOTE — DISCHARGE INSTRUCTIONS
"[facility]  Home Care Following Anesthesia  For 24 hours after surgery:  1. Get plenty of rest.  A responsible adult must stay with you for at least 24 hours after you leave the surgery center.  2. Do not drive or use heavy equipment.  If you have weakness or tingling, don't drive or use heavy equipment until this feeling goes away.   3. Do not drink alcohol.   4. Avoid strenuous or risky activities.  Ask for help when climbing stairs.  5. You may feel lightheaded.  IF so, sit for a few minutes before standing.  Have someone help you get up.   6. If you have nausea (feel sick to your stomach): Drink only clear liquids such as apple juice, ginger ale, broth or 7-Up.  Rest may also help.  Be sure to drink enough fluids.  Move to a regular diet as you feel able.   7. You may have a slight fever.  Call the doctor if your fever is over 100 F (37.7 C) (taken under the tongue) or lasts longer than 24 hours.  8. You may have a dry mouth, a sore throat, muscle aches or trouble sleeping. These should go away after 24 hours.  9. Do not make important or legal decisions.        Today you received a Marcaine or bupivacaine block to numb the nerves near your surgery site.  This is a block using local anesthetic or \"numbing\" medication injected around the nerves to anesthetize or \"numb\" the area supplied by those nerves.  This block is injected into the muscle layer near your surgical site.  The medication may numb the location where you had surgery for 6-18 hours, but may last up to 24 hours.  If your surgical site is an arm or leg you should be careful with your affected limb, since it is possible to injure your limb without being aware of it due to the numbing.  Until full feeling returns, you should guard against bumping or hitting your limb, and avoid extreme hot or cold temperatures on the skin.  As the block wears off, the feeling will return as a tingling or prickly sensation near your " surgical site.  You will experience more discomfort from your incision as the feeling returns.  You may want to take a pain pill (a narcotic or Tylenol if this was prescribed by your surgeon) when you start to experience mild pain before the pain beccomes more severe.  If your pain medications do not control your pain you should notifiy your surgeon.    Tips for taking pain medications  To get the best pain relief possible, remember these points:    Take pain medications as directed, before pain becomes severe.    Pain medication can upset your stomach: taking it with food may help.    Constipation is a common side effect of pain medication. Drink plenty of  fluids.    Eat foods high in fiber. Take a stool softener if recommended by your doctor or pharmacist.    Do not drink alcohol, drive or operate machinery while taking pain medications.    Ask about other ways to control pain, such as with heat, ice or relaxation.    Tylenol/Acetaminophen Consumption  To help encourage the safe use of acetaminophen, the makers of TYLENOL  have lowered the maximum daily dose for single-ingredient Extra Strength TYLENOL  (acetaminophen) products sold in the [place] from 8 pills per day (4,000 mg) to 6 pills per day (3,000 mg). The dosing interval has also changed from 2 pills every 4-6 hours to 2 pills every 6 hours.    If you feel your pain relief is insufficient, you may take Tylenol/Acetaminophen in addition to your narcotic pain medication.     Be careful not to exceed 3,000 mg of Tylenol/Acetaminophen in a 24 hour period from all sources.    If you are taking extra strength Tylenol/acetaminophen (500 mg), the maximum dose is 6 tablets in 24 hours.    If you are taking regular strength acetaminophen (325 mg), the maximum dose is 9 tablets in 24 hours.    Call a doctor for any of the followin. Signs of infection (fever, growing tenderness at the surgery site, a large amount of drainage or bleeding, severe pain, foul-smelling  drainage, redness, swelling).  2. It has been over 8 to 10 hours since surgery and you are still not able to urinate (pass water).  3. Headache for over 24 hours.  4. Signs of Covid-19 infection (temperature over 100 degrees, shortness of breath, cough, loss of taste/smell, generalized body aches, persistent headache, chills, sore throat, nausea/vomiting/diarrhea)  Your doctor is:  Dr. William Leslie, General Surgery: [phone]                  Or dial [phone] and ask for the resident on call for:  General Surgery  For emergency care, call the:  [facility]:  [phone] (TTY for hearing impaired: [phone])

## 2021-03-24 NOTE — ANESTHESIA POSTPROCEDURE EVALUATION
Patient: Vero Lua    Procedure(s):  CHOLECYSTECTOMY, LAPAROSCOPIC    Diagnosis:Sludge in gallbladder [K82.8]  Abnormal finding on GI tract imaging [R93.3]  Diagnosis Additional Information: No value filed.    Anesthesia Type:  General    Note:  Disposition: Outpatient   Postop Pain Control: Uneventful            Sign Out: Well controlled pain   PONV: No   Neuro/Psych: Uneventful            Sign Out: Acceptable/Baseline neuro status   Airway/Respiratory: Uneventful            Sign Out: Acceptable/Baseline resp. status   CV/Hemodynamics: Uneventful            Sign Out: Acceptable CV status   Other NRE: NONE   DID A NON-ROUTINE EVENT OCCUR? No         Last vitals:  Vitals:    03/24/21 0908 03/24/21 0926 03/24/21 0941   BP: 99/60 102/60 106/65   Pulse: 58 57 59   Resp: 16 16 16   Temp: 36.1  C (97  F) 36.1  C (97  F) 36.1  C (97  F)   SpO2: 98% 100% 100%       Last vitals prior to Anesthesia Care Transfer:  CRNA VITALS  3/24/2021 0758 - 3/24/2021 0858      3/24/2021             Pulse:  57    SpO2:  99 %    Resp Rate (set):  10          Electronically Signed By: Ganesh Sen MD  March 24, 2021  1:02 PM

## 2021-03-24 NOTE — OP NOTE
[facility] [place]    Operative Note    Pre-operative diagnosis: Sludge in gallbladder [K82.8]  Abnormal finding on GI tract imaging    Post-operative diagnosis Same as above   Procedure: Procedure(s):  CHOLECYSTECTOMY, LAPAROSCOPIC   Surgeon: Surgeon(s) and Role:     * William Leslie MD - Primary     * Radha Garcia MD - Resident - Assisting   Anesthesia: General    Estimated blood loss: Less than 10 ml   Drains: None   Specimens: ID Type Source Tests Collected by Time Destination   A :  Tissue Gallbladder and Contents SURGICAL PATHOLOGY EXAM William Leslie MD 3/24/2021  8:05 AM       Findings: Non-necrotic gallbladder with minimal chronic inflammatory changes.  The gallbladder was normal in appearance.  There were a few adhesions from adjacent tissue to the gallbladder.   Complications: None.   Implants: None.       OPERATIVE INDICATIONS:  Vero Lua is a 55 year old female with a history of RUQ  abdominal pain associated with EUS showing sludge in gallbladder and HIDA documents EF of 34%    After understanding the risks and benefits of proceeding with surgery, the patient has an indication for laparoscopic cholecystectomy and consented to undergo surgery.    Risks of surgery were reviewed with Vero Lua.    These include, but are not limited to, death, myocardial infarction, pneumonia, urinary tract infection, deep venous thrombosis with or without pulmonary embolus, abdominal infection from bowel injury or abscess, bowel obstruction, wound infection, and bleeding.    More specific risks related to laparoscopic cholecystectomy include bile duct injury (3/1000), bile leak (10/1000), retained common bile duct stone (10/1000), postcholecystectomy diarrhea (1-2%) and these complications may require additional treatment.    OPERATIVE DETAILS:      The patient was brought to the operating room and prepared in a routine fashion.  A timeout was performed  prior to surgery and documented by the nursing team.     Under the benefits of general anesthesia, a left upper quadrant Veress needle was inserted and pneumoperitoneum was established using carbon dioxide gas to a maximum pressure of 15 mmHg.  A total of 4 ports were placed and the laparoscope was utilized from the umbilical port.     The patient was moved into a steep reverse Trendelenberg position.    Adhesions to the gallbladder were taken down with a combination of blunt and sharp dissection.     The gallbladder was grasped at its fundus and retracted cephalad.  It was also grasped at its infundibulum and retracted laterally.       Findings related to the gallbladder are as noted above.     A complete dissection starting on the gallbladder, identifying the cystic artery on the surface of the gallbladder, was performed.  The cystic artery in this manner was  away from the gallbladder and the infundibulum of the gallbladder and the junction of gallbladder and cystic duct was clearly and completely identified with blunt dissection.  All of this dissection was performed on the gallbladder wall and clearly above the triangle of Calot.     Next, a dissection of gallbladder retrograde from fundus and the peritoneum was divided along the lateral aspect of the gallbladder. Next, a complete dissection of the upper aspect of the triangle of Calot was performed and the critical view of safety was achieved.     The cystic artery and cystic duct were clipped securely and divided between clips. The gallbladder was then removed out of its fossa using electrocautery.  It was placed into an Endocatch bag and removed from the abdomen.     Next, the gallbladder fossa was irrigated with a small aliquot of saline and the saline was aspirated.     Complete hemostasis was achieved.     The skin was closed using 4-0 monocryl suture. Due to patient allergies to skin glue, the sites were then covered with medipore bandages    I  was present for all critical components of the operation and all needle and sponge counts were correct x2 at the end of the procedure.    William Leslie MD  Surgery  [phone] (hospital )        Radha Garcia MD   General Surgery PGY2  [phone]

## 2021-03-24 NOTE — ANESTHESIA CARE TRANSFER NOTE
Patient: Vero Lua    Procedure(s):  CHOLECYSTECTOMY, LAPAROSCOPIC    Diagnosis: Sludge in gallbladder [K82.8]  Abnormal finding on GI tract imaging [R93.3]  Diagnosis Additional Information: No value filed.    Anesthesia Type:   No value filed.     Note:    Oropharynx: oropharynx clear of all foreign objects  Level of Consciousness: drowsy and awake  Oxygen Supplementation: nasal cannula  Level of Supplemental Oxygen (L/min / FiO2): 2  Independent Airway: airway patency satisfactory and stable  Dentition: dentition unchanged  Vital Signs Stable: post-procedure vital signs reviewed and stable  Report to RN Given: handoff report given  Patient transferred to: PACU  Comments: VSS and WNL, comfortable, no PONV, report to Gabriel RN  Handoff Report: Identifed the Patient, Identified the Reponsible Provider, Reviewed the pertinent medical history, Discussed the surgical course, Reviewed Intra-OP anesthesia mangement and issues during anesthesia, Set expectations for post-procedure period and Allowed opportunity for questions and acknowledgement of understanding      Vitals: (Last set prior to Anesthesia Care Transfer)  CRNA VITALS  3/24/2021 0758 - 3/24/2021 0832      3/24/2021             Pulse:  57    SpO2:  99 %    Resp Rate (set):  10        Electronically Signed By: WILBER Clifton CRNA  March 24, 2021  8:32 AM

## 2021-03-25 LAB — COPATH REPORT: NORMAL

## 2021-03-26 ENCOUNTER — PATIENT OUTREACH (OUTPATIENT)
Dept: ENDOCRINOLOGY | Facility: CLINIC | Age: 56
End: 2021-03-26

## 2021-03-26 NOTE — PROGRESS NOTES
RN Post-Op/Post-Discharge Care Coordination Note    Ms. Vero Lua is a 55 year old female who underwent laparoscopic cholecystectomy on 3/24/21 with  Dr. Daniel Leslie.  Spoke with Patient.    Support  Patient able to care for self independently     Health Status  Fevers/chills: Patient denies any fever or chills.  Nausea/Vomiting: Patient denies nausea/vomiting.  Eating/drinking: Patient is able to eat and drink without any complaints.  Bowel habits: Patient reports no bowel movement since surgery and passing gas.  Patient is going to try Miralax today.  Drains (TONE): N/A  Incisions: Patient denies any signs and symptoms of infection.  Wound closure:  Steri-strips.  Pain: 6 on a 10 scale.  New Medications:  Oxycodone.  Recommended to alternate Tylenol with pain medication.    Activity/Restrictions  No lifting in excess of 15-20 pounds for 3-4 weeks    Equipment  None    Pathology reviewed with patient:  Yes   Chronic cholecystitis, cholesterolosis     Forms/Letters  No    All of her questions were answered including reviewing restrictions, pathology, and wound care.  She will call this office if she has any further questions and/or concerns.      In lieu of a post-op clinic patient that patient would like to be contacted in approximately 7-10 days via telephone.    A copy of this note was routed to the primary surgeon.      Whom and When to Call  Patient acknowledges understanding of how to manage any medication changes and   when to seek medical care.     Patient advised that if after hour medical concerns arise to please call [phone] and choose option 4 to speak to the physician on call.

## 2021-03-27 ENCOUNTER — NURSE TRIAGE (OUTPATIENT)
Dept: NURSING | Facility: CLINIC | Age: 56
End: 2021-03-27

## 2021-03-27 ENCOUNTER — TELEPHONE (OUTPATIENT)
Dept: SURGERY | Facility: CLINIC | Age: 56
End: 2021-03-27

## 2021-03-28 NOTE — TELEPHONE ENCOUNTER
Pt called regarding erythema and itching of incisions    POD 3 lap jocelyne    Pt noticed redness and itching of all port-sites. Asking if hydrocortisone cream can be applied to help with itching. States that she has very sensitive skin. Due to allergy to Dermabond, incisions were closed with subcuticular stitches and covered with bandages, no glue.     Pt otherwise feeling well, tolerating PO without nausea, no fevers/chills. No drainage from incisions. No tenderness. Voiding, passing flatus and ambulating fine.    Recommended pt come to [place] ED if experiences worsening redness, tenderness, fever, chills, malaise, incision site drainage or if concern continues. Recommended against use of hydrocortisone creams or other ointments to incisions themselves due to concern for wound healing. Recommended to keep incisions clean and dry.  May try benadryl for itch relief.     Cindy Gentile MD  Surgery PGY-2

## 2021-03-28 NOTE — TELEPHONE ENCOUNTER
Caller had laparoscopic jocelyne 3 days ago; today noted that  Areas around incisions is red bumpy and  and itchy; no drainage   Streaking; no fever. Teixeira not  Follow  Out line of bandage tht she  Removed.    Caller reprots very sensitive skin with  many allergies.    triage protocol reviewed   Advised to speak with surgery resident on call  For further advisement    Paged via   @ 7:22 PM   to call patient at home @ [phone]  Bel Hurst RN  FNA           Reason for Disposition    [1] Caller has URGENT question AND [2] triager unable to answer question    Additional Information    Negative: [1] Major abdominal surgical incision AND [2] wound gaping open AND [3] visible internal organs    Negative: Sounds like a life-threatening emergency to the triager    Negative: [1] Bleeding from incision AND [2] won't stop after 10 minutes of direct pressure    Negative: [1] Widespread rash AND [2] bright red, sunburn-like    Negative: Severe pain in the incision    Negative: [1] Incision gaping open AND [2] < 48 hours since wound re-opened    Negative: [1] Incision gaping open AND [2] length of opening > 2 inches (5 cm)    Negative: Patient sounds very sick or weak to the triager    Negative: Sounds like a serious complication to the triager    Negative: Fever > 100.4 F (38.0 C)    Negative: [1] Incision looks infected (spreading redness, pain) AND [2] fever > 99.5 F (37.5 C)    Negative: [1] Incision looks infected (spreading redness, pain) AND [2] large red area (> 2 in. or 5 cm)    Negative: [1] Incision looks infected (spreading redness, pain) AND [2] face wound    Negative: [1] Red streak runs from the incision AND [2] longer than 1 inch (2.5 cm)    Negative: [1] Pus or bad-smelling fluid draining from incision AND [2] no fever    Protocols used: POST-OP INCISION SYMPTOMS-A-AH

## 2021-04-10 ENCOUNTER — HEALTH MAINTENANCE LETTER (OUTPATIENT)
Age: 56
End: 2021-04-10

## 2021-04-19 ENCOUNTER — TELEPHONE (OUTPATIENT)
Dept: SURGERY | Facility: CLINIC | Age: 56
End: 2021-04-19

## 2021-04-19 ENCOUNTER — PATIENT OUTREACH (OUTPATIENT)
Dept: ENDOCRINOLOGY | Facility: CLINIC | Age: 56
End: 2021-04-19

## 2021-04-19 NOTE — TELEPHONE ENCOUNTER
Patient left VM message on general surgery line requesting an appointment with . Left VM message with my direct call back number for patient to call me to discuss.

## 2021-04-19 NOTE — PROGRESS NOTES
Patient left message stating she needed a postop appt with .  Left message for patient to return call.

## 2021-05-06 ENCOUNTER — VIRTUAL VISIT (OUTPATIENT)
Dept: SURGERY | Facility: CLINIC | Age: 56
End: 2021-05-06
Payer: COMMERCIAL

## 2021-05-06 VITALS — HEIGHT: 68 IN | WEIGHT: 176 LBS | BODY MASS INDEX: 26.67 KG/M2

## 2021-05-06 DIAGNOSIS — K81.1 CHRONIC CHOLECYSTITIS: Primary | ICD-10-CM

## 2021-05-06 PROCEDURE — 99024 POSTOP FOLLOW-UP VISIT: CPT | Mod: GT | Performed by: SURGERY

## 2021-05-06 ASSESSMENT — PAIN SCALES - GENERAL: PAINLEVEL: NO PAIN (0)

## 2021-05-06 ASSESSMENT — MIFFLIN-ST. JEOR: SCORE: 1441.83

## 2021-05-06 NOTE — NURSING NOTE
"Chief Complaint   Patient presents with     RECHECK     Follow up lap jocelyne.       Vitals:    05/06/21 1337   Weight: 79.8 kg (176 lb)   Height: 1.727 m (5' 8\")       Body mass index is 26.76 kg/m .                            ARIN MAJANO, EMT    "

## 2021-05-06 NOTE — LETTER
"5/6/2021       RE: Vero Lua  [id number]  [place] [address]     Dear Colleague,    Thank you for referring your patient, Vero Lua, to the [facility]. Please see a copy of my visit note below.    Vero is a 55 year old who is being evaluated via a billable video visit.      How would you like to obtain your AVS? MyChart  If the video visit is dropped, the invitation should be resent by: Text to cell phone: [phone]  Will anyone else be joining your video visit? No    Video-Visit Details    Type of service:  Video Visit    Video Start Time: 208pm    Video End Time:215pm    Originating Location (pt. Location): Home    Distant Location (provider location):  [facility]     Platform used for Video Visit: [employer]       Patient underwent prior gallbladder removal surgery and this occurred 4+ weeks ago.    Vero Lua overall has the following complaints: some constipation.  Otherwise doing fine.  Some occasional issues in right upper quadrant still; may be residual healing vs. Underlying problem which lead to gallbladder removal (only time will tell).    On exam:  Ht 1.727 m (5' 8\")   Wt 79.8 kg (176 lb)   LMP 06/27/2012   BMI 26.76 kg/m    Lung exam: breathing unlabored  Abdominal exam: deferred; report that incisions healed fine.    Plan:  Can take miralax 10 doses followed by 1-2 doses daily for constipation.  Follow-up only as needed.    William Leslie MD  Surgery  [phone] (hospital )  [phone] (clinic nurses)          "

## 2021-05-06 NOTE — PROGRESS NOTES
"Vero is a 55 year old who is being evaluated via a billable video visit.      How would you like to obtain your AVS? MyChart  If the video visit is dropped, the invitation should be resent by: Text to cell phone: [phone]  Will anyone else be joining your video visit? No    Video Start Time: 208pm  Video-Visit Details    Type of service:  Video Visit    Video End Time:215pm    Originating Location (pt. Location): Home    Distant Location (provider location):  [facility] [place]     Platform used for Video Visit: [facility]       Patient underwent prior gallbladder removal surgery and this occurred 4+ weeks ago.    Vero Lua overall has the following complaints: some constipation.  Otherwise doing fine.  Some occasional issues in right upper quadrant still; may be residual healing vs. Underlying problem which lead to gallbladder removal (only time will tell).    On exam:  Ht 1.727 m (5' 8\")   Wt 79.8 kg (176 lb)   LMP 06/27/2012   BMI 26.76 kg/m    Lung exam: breathing unlabored  Abdominal exam: deferred; report that incisions healed fine.    Plan:  Can take miralax 10 doses followed by 1-2 doses daily for constipation.  Follow-up only as needed.    William Leslie MD  Surgery  [phone] (hospital )  [phone] (clinic nurses)                "

## 2021-05-31 ENCOUNTER — RECORDS - HEALTHEAST (OUTPATIENT)
Dept: ADMINISTRATIVE | Facility: CLINIC | Age: 56
End: 2021-05-31

## 2021-06-02 ENCOUNTER — RECORDS - HEALTHEAST (OUTPATIENT)
Dept: ADMINISTRATIVE | Facility: CLINIC | Age: 56
End: 2021-06-02

## 2021-07-07 ENCOUNTER — TELEPHONE (OUTPATIENT)
Dept: GASTROENTEROLOGY | Facility: CLINIC | Age: 56
End: 2021-07-07

## 2021-07-07 DIAGNOSIS — R93.3 ABNORMAL FINDING ON GI TRACT IMAGING: Primary | ICD-10-CM

## 2021-07-07 NOTE — TELEPHONE ENCOUNTER
EUS from 7/20/20 with Dr. Hurst:  Recommendation: - General anesthesia recovery with probable                             discharge home this morning/afternoon                             - HIDA scan as our next step in evaluation RUQ                             pain; if negative we will need to consider                             therapies for irritable bowel                             - MRI/MRCP in one year for follow up of small                             cystic lesion (as explained above) and for                             surveillance                             - The findings and recommendations were discussed                             with the patient and their family       Called Pt to organize annual MRI/MRCP. No answer. Left  for Pt to call back.    Frank Escudero LPN

## 2021-08-09 ENCOUNTER — TRANSFERRED RECORDS (OUTPATIENT)
Dept: HEALTH INFORMATION MANAGEMENT | Facility: CLINIC | Age: 56
End: 2021-08-09

## 2021-08-19 NOTE — PROGRESS NOTES
"Assessment and Plan:     (L98.9) Skin lesion  (primary encounter diagnosis)  Comment: non-healing lesion RLE  Plan: Adult Dermatology Referral    (E03.9) Hypothyroidism, unspecified type  Comment: would like to see endo for more specific testing  Plan: Adult Endocrinology Referral      Мария Saldaña PA-C        Raisa Pham is a 55 year old who presents for the following health issues     She has a history of hypthyroid would like some specific thyroid studies done--feels fatigued, constipated and has cold intolerance frequently, even when TSH is normal.     Noticed red/crust lesion on right leg in June, it scabbed over and hasn't gone away    Started out as red/yellowish lesion then crusted and now has been present for > 2 months  It's non-tender, about the size of a dime, it seems to be getting bigger  No purulence, no fever/chills  HPI       Review of Systems   See above      Objective    LMP 06/27/2012      /71 (BP Location: Left arm, Patient Position: Chair, Cuff Size: Adult Large)   Pulse 83   Temp 97.5  F (36.4  C) (Tympanic)   Ht 1.727 m (5' 8\")   Wt 81.6 kg (180 lb)   LMP 06/27/2012   SpO2 97%   Breastfeeding No   BMI 27.37 kg/m        Physical Exam     RLE: small crusted lesion on right lateral lower leg, a little smaller than a dime, intradermal, no surrounding erythema or induration, non-tender, no fluctuance        "

## 2021-08-20 ENCOUNTER — OFFICE VISIT (OUTPATIENT)
Dept: FAMILY MEDICINE | Facility: CLINIC | Age: 56
End: 2021-08-20
Payer: COMMERCIAL

## 2021-08-20 VITALS
BODY MASS INDEX: 27.28 KG/M2 | HEIGHT: 68 IN | TEMPERATURE: 97.5 F | WEIGHT: 180 LBS | OXYGEN SATURATION: 97 % | SYSTOLIC BLOOD PRESSURE: 101 MMHG | HEART RATE: 83 BPM | DIASTOLIC BLOOD PRESSURE: 71 MMHG

## 2021-08-20 DIAGNOSIS — E03.9 HYPOTHYROIDISM, UNSPECIFIED TYPE: ICD-10-CM

## 2021-08-20 DIAGNOSIS — L98.9 SKIN LESION: Primary | ICD-10-CM

## 2021-08-20 PROCEDURE — 99213 OFFICE O/P EST LOW 20 MIN: CPT | Performed by: PHYSICIAN ASSISTANT

## 2021-08-20 ASSESSMENT — MIFFLIN-ST. JEOR: SCORE: 1459.97

## 2021-08-22 NOTE — PROGRESS NOTES
CTA results of the head and neck show evidence of possible suggestion of subclavian steal on the left side. The patient states that she has had intermittent dizziness which has been ongoing actually for years.   The patient denies chest pain or shortness o Quick Note:    Decrease dose further to .88 and recheck labs in 6 weeks.  ______

## 2021-08-23 ENCOUNTER — MYC MEDICAL ADVICE (OUTPATIENT)
Dept: CALL CENTER | Age: 56
End: 2021-08-23

## 2021-09-25 ENCOUNTER — HEALTH MAINTENANCE LETTER (OUTPATIENT)
Age: 56
End: 2021-09-25

## 2021-10-23 DIAGNOSIS — E03.9 HYPOTHYROIDISM, UNSPECIFIED TYPE: ICD-10-CM

## 2021-10-25 RX ORDER — LEVOTHYROXINE SODIUM 100 UG/1
TABLET ORAL
Qty: 90 TABLET | Refills: 1 | Status: SHIPPED | OUTPATIENT
Start: 2021-10-25 | End: 2022-04-05

## 2021-10-25 NOTE — TELEPHONE ENCOUNTER
Prescription approved per [facility] Refill Protocol.    Stephanie BARKER RN  EP Triage

## 2021-11-10 ENCOUNTER — TRANSFERRED RECORDS (OUTPATIENT)
Dept: HEALTH INFORMATION MANAGEMENT | Facility: CLINIC | Age: 56
End: 2021-11-10
Payer: COMMERCIAL

## 2022-01-15 ENCOUNTER — HEALTH MAINTENANCE LETTER (OUTPATIENT)
Age: 57
End: 2022-01-15

## 2022-04-02 DIAGNOSIS — E03.9 HYPOTHYROIDISM, UNSPECIFIED TYPE: ICD-10-CM

## 2022-04-05 RX ORDER — LEVOTHYROXINE SODIUM 100 UG/1
TABLET ORAL
Qty: 90 TABLET | Refills: 1 | Status: SHIPPED | OUTPATIENT
Start: 2022-04-05 | End: 2022-06-30

## 2022-05-23 ENCOUNTER — ANCILLARY PROCEDURE (OUTPATIENT)
Dept: MAMMOGRAPHY | Facility: CLINIC | Age: 57
End: 2022-05-23
Attending: INTERNAL MEDICINE
Payer: COMMERCIAL

## 2022-05-23 ENCOUNTER — OFFICE VISIT (OUTPATIENT)
Dept: OBGYN | Facility: CLINIC | Age: 57
End: 2022-05-23

## 2022-05-23 VITALS
DIASTOLIC BLOOD PRESSURE: 68 MMHG | WEIGHT: 186 LBS | SYSTOLIC BLOOD PRESSURE: 102 MMHG | HEIGHT: 68 IN | BODY MASS INDEX: 28.19 KG/M2

## 2022-05-23 DIAGNOSIS — Z12.31 VISIT FOR SCREENING MAMMOGRAM: ICD-10-CM

## 2022-05-23 DIAGNOSIS — Z01.419 ENCOUNTER FOR GYNECOLOGICAL EXAMINATION WITHOUT ABNORMAL FINDING: Primary | ICD-10-CM

## 2022-05-23 PROCEDURE — G0145 SCR C/V CYTO,THINLAYER,RESCR: HCPCS | Performed by: NURSE PRACTITIONER

## 2022-05-23 PROCEDURE — 77067 SCR MAMMO BI INCL CAD: CPT | Mod: TC | Performed by: RADIOLOGY

## 2022-05-23 PROCEDURE — 87624 HPV HI-RISK TYP POOLED RSLT: CPT | Performed by: NURSE PRACTITIONER

## 2022-05-23 PROCEDURE — 99386 PREV VISIT NEW AGE 40-64: CPT | Performed by: NURSE PRACTITIONER

## 2022-05-23 ASSESSMENT — ANXIETY QUESTIONNAIRES
7. FEELING AFRAID AS IF SOMETHING AWFUL MIGHT HAPPEN: NOT AT ALL
2. NOT BEING ABLE TO STOP OR CONTROL WORRYING: NOT AT ALL
1. FEELING NERVOUS, ANXIOUS, OR ON EDGE: NOT AT ALL
5. BEING SO RESTLESS THAT IT IS HARD TO SIT STILL: NOT AT ALL
GAD7 TOTAL SCORE: 0
GAD7 TOTAL SCORE: 0
3. WORRYING TOO MUCH ABOUT DIFFERENT THINGS: NOT AT ALL
IF YOU CHECKED OFF ANY PROBLEMS ON THIS QUESTIONNAIRE, HOW DIFFICULT HAVE THESE PROBLEMS MADE IT FOR YOU TO DO YOUR WORK, TAKE CARE OF THINGS AT HOME, OR GET ALONG WITH OTHER PEOPLE: NOT DIFFICULT AT ALL
6. BECOMING EASILY ANNOYED OR IRRITABLE: NOT AT ALL

## 2022-05-23 ASSESSMENT — PATIENT HEALTH QUESTIONNAIRE - PHQ9
5. POOR APPETITE OR OVEREATING: NOT AT ALL
SUM OF ALL RESPONSES TO PHQ QUESTIONS 1-9: 0

## 2022-05-23 NOTE — PROGRESS NOTES
Vero is a 56 year old  female who presents for annual exam.     Besides routine health maintenance, she has no other health concerns today .    HPI: here for annual exam.  Is a former Dr. Benz patient.  Has been a few years since annual.  Menopausal, no HRT. Minimal USI.  All blood work  Done with PCP.          The patient's PCP is Dr Emile Jara MD.        GYNECOLOGIC HISTORY:    Patient's last menstrual period was 2012.  She  reports that she has never smoked. She has never used smokeless tobacco.  Patient is sexually active.  STD testing offered?  Declined     Last PHQ-9 score on record =   PHQ-9 SCORE 2022   PHQ-9 Total Score -   PHQ-9 Total Score 0     Last GAD7 score on record =   BASILIO-7 SCORE 2022   Total Score 0     Alcohol Score = 0    HEALTH MAINTENANCE:  Cholesterol:   Recent Labs   Lab Test 19  1139 18  1335 14  1128   CHOL 179 183 151   HDL 72 77 60   LDL 84 87 68   TRIG 117 93 115   CHOLHDLRATIO  --   --  2.5     TSH   Date Value Ref Range Status   2021 2.00 0.40 - 4.00 mU/L Final     Last Mammo: 19, Result: Normal, Next Mammo: Today   Pap:   Lab Results   Component Value Date    PAP NIL, NEG-HPV 2018   Colonoscopy:  21, Result: Normal, Next Colonoscopy: 10 years.  Dexa:  never  Health maintenance updated:  yes    HISTORY:  OB History    Para Term  AB Living   1 1 1 0 0 1   SAB IAB Ectopic Multiple Live Births   0 0 0 0 0      # Outcome Date GA Lbr Demetris/2nd Weight Sex Delivery Anes PTL Lv   1 Term                Patient Active Problem List   Diagnosis     Fibromyalgia     Restless leg syndrome     Interstitial cystitis     Migraines     Hypothyroid     Depression, major, in partial remission (H)     Anxiety     Adenomatous polyp of colon     Chronic insomnia     Abnormal finding on GI tract imaging     Sludge in gallbladder     Past Surgical History:   Procedure Laterality Date     COLONOSCOPY       ENDOSCOPIC ULTRASOUND  UPPER GASTROINTESTINAL TRACT (GI) N/A 7/20/2020    Procedure: DIAGNOSTIC ENDOSCOPIC ULTRASOUND, ESOPHAGOSCOPY / UPPER GASTROINTESTINAL TRACT;  Surgeon: Juan Hurst MD;  Location:  OR     [facility] procedure  2015, 2010 and 2008    [facility]     LAPAROSCOPIC CHOLECYSTECTOMY N/A 3/24/2021    Procedure: CHOLECYSTECTOMY, LAPAROSCOPIC;  Surgeon: William Leslie MD;  Location: [facility] OR      Social History     Tobacco Use     Smoking status: Never Smoker     Smokeless tobacco: Never Used   Substance Use Topics     Alcohol use: No     Alcohol/week: 0.0 standard drinks      Problem (# of Occurrences) Relation (Name,Age of Onset)    Depression (2) Sister, Father    Lymphoma (1) Father    No Known Problems (1) Brother    Osteoporosis (1) Maternal Grandmother    Pancreatic Cancer (1) Mother       Negative family history of: Deep Vein Thrombosis, Anesthesia Reaction            Current Outpatient Medications   Medication Sig     BOTOX 200 units injection      buPROPion (WELLBUTRIN XL) 150 MG 24 hr tablet every morning      clonazePAM (KLONOPIN) 1 MG tablet Take 1 tablet (1 mg) by mouth At Bedtime     levothyroxine (SYNTHROID/LEVOTHROID) 100 MCG tablet TAKE 1 TABLET BY MOUTH DAILY     loratadine (CLARITIN) 10 MG tablet Take 10 mg by mouth daily as needed      rizatriptan (MAXALT) 10 MG tablet at onset of headache      topiramate (TOPAMAX) 200 MG tablet Take 200 mg by mouth At Bedtime      vortioxetine (BRINTELLIX) 10 MG tablet Take 1 tablet (10 mg) by mouth daily (Patient taking differently: Take 10 mg by mouth At Bedtime)     zolpidem (AMBIEN) 5 MG tablet TAKE 1 TABLET BY MOUTH AT BEDTIME AS NEEDED (Patient taking differently: nightly as needed TAKE 1 TABLET BY MOUTH AT BEDTIME AS NEEDED)     No current facility-administered medications for this visit.     Allergies   Allergen Reactions     Adhesive Tape Rash     Some tapes and surgical glue       Past medical, surgical, social and family histories were  "reviewed and updated in EPIC.    ROS:   12 point review of systems negative other than symptoms noted below or in the HPI.  No urinary frequency or dysuria, bladder or kidney problems    EXAM:  /68   Ht 1.715 m (5' 7.5\")   Wt 84.4 kg (186 lb)   LMP 06/27/2012   BMI 28.70 kg/m     BMI: Body mass index is 28.7 kg/m .    PHYSICAL EXAM:  Constitutional:   Appearance: Well nourished, well developed, alert, in no acute distress  Neck:  Lymph Nodes:  No lymphadenopathy present    Thyroid:  Gland size normal, nontender, no nodules or masses present  on palpation  Chest:  Respiratory Effort:  Breathing unlabored  Cardiovascular:    Heart: Auscultation:  Regular rate, normal rhythm, no murmurs present  Breasts: Inspection of Breasts:  No lymphadenopathy present., Palpation of Breasts and Axillae:  No masses present on palpation, no breast tenderness., Axillary Lymph Nodes:  No lymphadenopathy present. and No nodularity, asymmetry or nipple discharge bilaterally.  Gastrointestinal:   Abdominal Examination:  Abdomen nontender to palpation, tone normal without rigidity or guarding, no masses present, umbilicus without lesions   Liver and Spleen:  No hepatomegaly present, liver nontender to palpation    Hernias:  No hernias present  Lymphatic: Lymph Nodes:  No other lymphadenopathy present  Skin:  General Inspection:  No rashes present, no lesions present, no areas of  discoloration  Neurologic:    Mental Status:  Oriented X3.  Normal strength and tone, sensory exam                grossly normal, mentation intact and speech normal.    Psychiatric:   Mentation appears normal and affect normal/bright.         Pelvic Exam:  External Genitalia:     Normal appearance for age, no discharge present, no tenderness present, no inflammatory lesions present, color normal  Vagina:     Normal vaginal vault without central or paravaginal defects, no discharge present, no inflammatory lesions present, no masses present  Bladder:  "    Nontender to palpation  Urethra:   Urethral Body:  Urethra palpation normal, urethra structural support normal   Urethral Meatus:  No erythema or lesions present  Cervix:     Appearance healthy, no lesions present, nontender to palpation, no bleeding present  Uterus:     Uterus: firm, normal sized and nontender, anteverted in position.   Adnexa:     No adnexal tenderness present, no adnexal masses present  Perineum:     Perineum within normal limits, no evidence of trauma, no rashes or skin lesions present  Anus:     Anus within normal limits, no hemorrhoids present  Inguinal Lymph Nodes:     No lymphadenopathy present  Pubic Hair:     Normal pubic hair distribution for age  Genitalia and Groin:     No rashes present, no lesions present, no areas of discoloration, no masses present      COUNSELING:   Reviewed preventive health counseling, as reflected in patient instructions       Regular exercise       Healthy diet/nutrition       Osteoporosis prevention/bone health       Colorectal Cancer Screening       (Mary)menopause management    BMI: Body mass index is 28.7 kg/m .      ASSESSMENT:  56 year old female with satisfactory annual exam.    ICD-10-CM    1. Encounter for gynecological examination without abnormal finding  Z01.419 Pap thin layer screen with HPV - recommended age 30 - 65 years       PLAN:  Normal Gyn exam.    Return prn or 1 year.  Pap every 3 years if normal.    WILBER Parra CNP

## 2022-05-27 LAB
BKR LAB AP GYN ADEQUACY: NORMAL
BKR LAB AP GYN INTERPRETATION: NORMAL
BKR LAB AP HPV REFLEX: NORMAL
BKR LAB AP PREVIOUS ABNORMAL: NORMAL
PATH REPORT.COMMENTS IMP SPEC: NORMAL
PATH REPORT.COMMENTS IMP SPEC: NORMAL
PATH REPORT.RELEVANT HX SPEC: NORMAL

## 2022-06-01 LAB
HUMAN PAPILLOMA VIRUS 16 DNA: NEGATIVE
HUMAN PAPILLOMA VIRUS 18 DNA: NEGATIVE
HUMAN PAPILLOMA VIRUS FINAL DIAGNOSIS: NORMAL
HUMAN PAPILLOMA VIRUS OTHER HR: NEGATIVE

## 2022-06-30 DIAGNOSIS — E03.9 HYPOTHYROIDISM, UNSPECIFIED TYPE: ICD-10-CM

## 2022-06-30 NOTE — TELEPHONE ENCOUNTER
Fax received from pharmacy stating pt must switch to mail service pharmacy per their insurance.  New RX needed for new pharmacy.

## 2022-07-05 RX ORDER — LEVOTHYROXINE SODIUM 100 UG/1
100 TABLET ORAL DAILY
Qty: 90 TABLET | Refills: 0 | Status: SHIPPED | OUTPATIENT
Start: 2022-07-05 | End: 2022-09-12

## 2022-07-07 DIAGNOSIS — F51.04 CHRONIC INSOMNIA: ICD-10-CM

## 2022-07-07 NOTE — TELEPHONE ENCOUNTER
Reason for Call:  Other prescription    Detailed comments: Prescription refill by mail order-- please call pt to confirm. Note the pt's appt was pushed back in the calendar due to provider schedule. Pt will be out of certain prescriptions by then.       Phone Number Patient can be reached at: Cell number on file:    Telephone Information:   Mobile [phone]       Best Time: any    Can we leave a detailed message on this number? YES    Call taken on 7/7/2022 at 1:31 PM by Phillip Issa

## 2022-07-07 NOTE — TELEPHONE ENCOUNTER
Triage Patient Outreach    Attempt # 1    Was call answered?  No.  Left voicemail to return call to clinic.  TC or Patient rep- Please review note with patient.  Transfer to triage if further questions.   We just need to know medication pt needs, and pharmacy pended, and sent to refill pool.   Moraima Hope RN

## 2022-07-08 DIAGNOSIS — F51.04 CHRONIC INSOMNIA: ICD-10-CM

## 2022-07-08 DIAGNOSIS — F41.9 ANXIETY: ICD-10-CM

## 2022-07-08 RX ORDER — ZOLPIDEM TARTRATE 5 MG/1
TABLET ORAL
Qty: 90 TABLET | Refills: 0 | Status: CANCELLED | OUTPATIENT
Start: 2022-07-08

## 2022-07-08 RX ORDER — CLONAZEPAM 1 MG/1
1 TABLET ORAL AT BEDTIME
Qty: 45 TABLET | Refills: 0 | Status: SHIPPED | OUTPATIENT
Start: 2022-07-27 | End: 2022-09-12

## 2022-07-08 NOTE — TELEPHONE ENCOUNTER
Pt did not know about Ambien, so she will pick that up at pharmacy.  Clonazepam bridge was to get pt through to next appt, so that's why the 9 tab script after the 30 tab script 6/17.  Pt has enough clonazepam to get through to 7/29, which is when she had a scheduled visit, but we moved date out to 9/12.    The ask is to fill enough clonazepam to get to new visit date.  Pended 45 day supply with a dispense date of 7/27/22 if this is ok?    Moraima Hope, RN  [facility] RN Triage Team

## 2022-07-08 NOTE — TELEPHONE ENCOUNTER
Routing refill request to provider for review/approval because:  Drug not on the [facility] refill protocol     Padmini Garcia RN

## 2022-07-08 NOTE — TELEPHONE ENCOUNTER
Per pdmp both recently filled by other physician.  Please confirm and cancel if not needed    Emile Jara M.D.

## 2022-07-08 NOTE — TELEPHONE ENCOUNTER
Received a call from the patient stating she is needing a refill of her Klonopin and Ambien. Patient did have an appointment scheduled with Dr. Jara on 6/29/22 but the appointment was cancelled. Patient is rescheduled to see Dr. Jara on 9/12/22.    Medication pended to the requested pharmacy and routing to refill pool for review.     Padmini Garcia RN

## 2022-07-13 RX ORDER — ZOLPIDEM TARTRATE 5 MG/1
TABLET ORAL
Qty: 90 TABLET | Refills: 0 | Status: SHIPPED | OUTPATIENT
Start: 2022-07-13 | End: 2022-09-12

## 2022-07-13 NOTE — TELEPHONE ENCOUNTER
Called and spoke to patient, stated that she only needs Ambien and Klonopin refilled. Only pended Ambien since patient will get a refill for Klonopin on 7/27/2022. Routing encounter to Cs Refill.      Sigrid UNDERWOOD MA on 7/13/2022 at 11:37 AM

## 2022-07-13 NOTE — TELEPHONE ENCOUNTER
Team, can you call patient and verify which medications patient needs refilled, pend medication and route to refill pool.    Dorothy Ortiz RN  [facility]

## 2022-09-12 ENCOUNTER — VIRTUAL VISIT (OUTPATIENT)
Dept: FAMILY MEDICINE | Facility: CLINIC | Age: 57
End: 2022-09-12
Payer: COMMERCIAL

## 2022-09-12 DIAGNOSIS — F33.41 RECURRENT MAJOR DEPRESSIVE DISORDER, IN PARTIAL REMISSION (H): Primary | ICD-10-CM

## 2022-09-12 DIAGNOSIS — F51.04 CHRONIC INSOMNIA: ICD-10-CM

## 2022-09-12 DIAGNOSIS — D12.6 ADENOMATOUS POLYP OF COLON, UNSPECIFIED PART OF COLON: ICD-10-CM

## 2022-09-12 DIAGNOSIS — E03.9 HYPOTHYROIDISM, UNSPECIFIED TYPE: ICD-10-CM

## 2022-09-12 DIAGNOSIS — F41.9 ANXIETY: ICD-10-CM

## 2022-09-12 PROBLEM — K82.8 SLUDGE IN GALLBLADDER: Status: RESOLVED | Noted: 2021-01-27 | Resolved: 2022-09-12

## 2022-09-12 PROBLEM — R93.3 ABNORMAL FINDING ON GI TRACT IMAGING: Status: RESOLVED | Noted: 2020-07-10 | Resolved: 2022-09-12

## 2022-09-12 PROCEDURE — 99213 OFFICE O/P EST LOW 20 MIN: CPT | Mod: 95 | Performed by: INTERNAL MEDICINE

## 2022-09-12 RX ORDER — LEVOTHYROXINE SODIUM 100 UG/1
100 TABLET ORAL DAILY
Qty: 90 TABLET | Refills: 3 | Status: SHIPPED | OUTPATIENT
Start: 2022-09-12 | End: 2023-10-09

## 2022-09-12 RX ORDER — CLONAZEPAM 1 MG/1
1 TABLET ORAL AT BEDTIME
Qty: 90 TABLET | Refills: 0 | Status: SHIPPED | OUTPATIENT
Start: 2022-09-12 | End: 2023-05-01

## 2022-09-12 RX ORDER — ZOLPIDEM TARTRATE 5 MG/1
TABLET ORAL
Qty: 90 TABLET | Refills: 0 | Status: SHIPPED | OUTPATIENT
Start: 2022-09-12 | End: 2022-10-18

## 2022-09-12 NOTE — PATIENT INSTRUCTIONS
I would get the shingrix shot at your pharmacy and also the new covid booster.  You are also due for a td shot and you can do this at your pharmacy as well.    Please follow up for fasting labs in the next few weeks, you can schedule this using Anonymesst.    Emile Jara M.D.         81

## 2022-09-12 NOTE — PROGRESS NOTES
Vero is a 57 year old who is being evaluated via a billable video visit.      How would you like to obtain your AVS? MyChart  If the video visit is dropped, the invitation should be resent by: Text to cell phone: [phone]  Will anyone else be joining your video visit? No            Subjective   Vero is a 57 year old, presenting for the following health issues:  Recheck Medication (Pt will provide pharmacy) and Thyroid Problem (Follow up)    This is a very pleasant 57-year-old who I am seeing for follow-up to a few issues.    Patient has depression with chronic insomnia and anxiety.  She is seeing psychiatry again, Dr. Hill.  She feels as if things are going quite well without any recent issues with anxiety or depression and she is sleeping quite well.  She takes her medications regularly.  I reviewed the PDMP website and there is no signs of misuse.    Patient has a history of colon polyps.  I reviewed her follow-up which was done last year and was normal.    The patient has hypothyroidism.  She takes her medication and I will get follow-up labs for that.    She is up-to-date on her mammogram as well as Pap smear.  She is due for immunizations which have written out for her in her after visit summary.  She exercises some but not a lot.  She feels well.    Vitals:  No vitals were obtained today due to virtual visit.    Physical Exam   GENERAL: Healthy, alert and no distress  EYES: Eyes grossly normal to inspection.  No discharge or erythema, or obvious scleral/conjunctival abnormalities.  RESP: No audible wheeze, cough, or visible cyanosis.  No visible retractions or increased work of breathing.    SKIN: Visible skin clear. No significant rash, abnormal pigmentation or lesions.  NEURO: Cranial nerves grossly intact.  Mentation and speech appropriate for age.  PSYCH: Mentation appears normal, affect normal/bright, judgement and insight intact, normal speech and appearance well-groomed.      ASSESSMENT:  1.  Depression/anxiety/insomnia, doing well, no signs of med abuse  2. Hypothyroidism, follow up tsh  3. Colon polyp, up to date on follow up  4. Health care maintenance    PLAN:  Follow up for labs  Shingrix, covid and td at pharm          Video-Visit Details    Video Start Time: 5:02 PM    Type of service:  Video Visit    Video End Time:5:14 PM    Originating Location (pt. Location): Home    Distant Location (provider location):  [facility]     Platform used for Video Visit: Xinrong

## 2022-09-20 ENCOUNTER — TELEPHONE (OUTPATIENT)
Dept: FAMILY MEDICINE | Facility: CLINIC | Age: 57
End: 2022-09-20

## 2022-09-20 NOTE — TELEPHONE ENCOUNTER
"Patient called requesting a 90 day supply of zolpidem. Per patient, they only received a \"handful of pills\"    Writer reviewed:  zolpidem (AMBIEN) 5 MG tablet 90 tablet 0 9/12/2022  No   Sig: TAKE 1 TABLET BY MOUTH AT BEDTIME AS NEEDED   Sent to pharmacy as: Zolpidem Tartrate 5 MG Oral Tablet (AMBIEN)   Class: E-Prescribe   Order: [id number]   E-Prescribing Status: Receipt confirmed by pharmacy (9/12/2022  5:14 PM CDT)       Pharmacy    [id number] - [place], [place] - [address] AT [facility] [address]     Writer advised patient to follow up with pharmacy to verify if they need new script or able to fill the 90 day supply. Patient expressed verbal understanding.    Dorothy Ortiz RN  [facility]    "

## 2022-09-30 ENCOUNTER — LAB (OUTPATIENT)
Dept: LAB | Facility: CLINIC | Age: 57
End: 2022-09-30
Payer: COMMERCIAL

## 2022-09-30 DIAGNOSIS — F33.41 RECURRENT MAJOR DEPRESSIVE DISORDER, IN PARTIAL REMISSION (H): ICD-10-CM

## 2022-09-30 DIAGNOSIS — E03.9 HYPOTHYROIDISM, UNSPECIFIED TYPE: ICD-10-CM

## 2022-09-30 LAB
ANION GAP SERPL CALCULATED.3IONS-SCNC: 3 MMOL/L (ref 3–14)
BUN SERPL-MCNC: 14 MG/DL (ref 7–30)
CALCIUM SERPL-MCNC: 8.8 MG/DL (ref 8.5–10.1)
CHLORIDE BLD-SCNC: 113 MMOL/L (ref 94–109)
CHOLEST SERPL-MCNC: 161 MG/DL
CO2 SERPL-SCNC: 27 MMOL/L (ref 20–32)
CREAT SERPL-MCNC: 0.95 MG/DL (ref 0.52–1.04)
ERYTHROCYTE [DISTWIDTH] IN BLOOD BY AUTOMATED COUNT: 13.2 % (ref 10–15)
FASTING STATUS PATIENT QL REPORTED: YES
GFR SERPL CREATININE-BSD FRML MDRD: 70 ML/MIN/1.73M2
GLUCOSE BLD-MCNC: 101 MG/DL (ref 70–99)
HCT VFR BLD AUTO: 43.2 % (ref 35–47)
HDLC SERPL-MCNC: 65 MG/DL
HGB BLD-MCNC: 13.6 G/DL (ref 11.7–15.7)
LDLC SERPL CALC-MCNC: 73 MG/DL
MCH RBC QN AUTO: 30.8 PG (ref 26.5–33)
MCHC RBC AUTO-ENTMCNC: 31.5 G/DL (ref 31.5–36.5)
MCV RBC AUTO: 98 FL (ref 78–100)
NONHDLC SERPL-MCNC: 96 MG/DL
PLATELET # BLD AUTO: NORMAL 10*3/UL
POTASSIUM BLD-SCNC: 3.8 MMOL/L (ref 3.4–5.3)
RBC # BLD AUTO: 4.42 10E6/UL (ref 3.8–5.2)
SODIUM SERPL-SCNC: 143 MMOL/L (ref 133–144)
T4 FREE SERPL-MCNC: 1.32 NG/DL (ref 0.76–1.46)
TRIGL SERPL-MCNC: 114 MG/DL
TSH SERPL DL<=0.005 MIU/L-ACNC: 5.98 MU/L (ref 0.4–4)
WBC # BLD AUTO: 5.1 10E3/UL (ref 4–11)

## 2022-09-30 PROCEDURE — 36415 COLL VENOUS BLD VENIPUNCTURE: CPT

## 2022-09-30 PROCEDURE — 85014 HEMATOCRIT: CPT

## 2022-09-30 PROCEDURE — 84443 ASSAY THYROID STIM HORMONE: CPT

## 2022-09-30 PROCEDURE — 80061 LIPID PANEL: CPT

## 2022-09-30 PROCEDURE — 85018 HEMOGLOBIN: CPT

## 2022-09-30 PROCEDURE — 84439 ASSAY OF FREE THYROXINE: CPT

## 2022-09-30 PROCEDURE — 85048 AUTOMATED LEUKOCYTE COUNT: CPT

## 2022-09-30 PROCEDURE — 85041 AUTOMATED RBC COUNT: CPT

## 2022-09-30 PROCEDURE — 80048 BASIC METABOLIC PNL TOTAL CA: CPT

## 2022-10-02 NOTE — RESULT ENCOUNTER NOTE
You should be able to view all the test results.  Your cbc or blood count is normal with no signs of anemia or blood disorders.  Your blood salts, sugar and kidney tests are fine.  I am not worried about the slightly high chloride or sugar, but please be sure to exercise and keep your weight down.    Your cholesterol is excllent, the hdl or good and the ldl or bad are also super.    Your thyroid test is just barely off, not enough to make changes, but let's repeat this in 4 months.  Please remember to do this.    If you have any questions please call me.    Emile Jara M.D.

## 2022-10-14 ENCOUNTER — TELEPHONE (OUTPATIENT)
Dept: FAMILY MEDICINE | Facility: CLINIC | Age: 57
End: 2022-10-14

## 2022-10-14 DIAGNOSIS — F51.04 CHRONIC INSOMNIA: ICD-10-CM

## 2022-10-14 NOTE — TELEPHONE ENCOUNTER
Prior Authorization Retail Medication Request    Medication/Dose: Zolpidem (Ambien) 5 mg tablet  ICD code (if different than what is on RX):    Previously Tried and Failed:    Rationale:      Insurance Name:    Insurance ID:        Pharmacy Information (if different than what is on RX)  Name:    Phone:

## 2022-10-17 NOTE — TELEPHONE ENCOUNTER
Prior Authorization Approval    Authorization Effective Date: 10/17/2022  Authorization Expiration Date: 10/17/2023  Medication: Zolpidem (Ambien) 5 mg tablet  Approved Dose/Quantity:    Reference #:     Insurance Company: [employer] - Phone [phone] Fax [phone]  Expected CoPay:       CoPay Card Available:      Foundation Assistance Needed:    Which Pharmacy is filling the prescription (Not needed for infusion/clinic administered): [id number] [place] [place] - [address] AT [facility] [address] [facility]  Pharmacy Notified: Yes  Patient Notified: Yes  **Instructed pharmacy to notify patient when script is ready to /ship.**

## 2022-10-17 NOTE — TELEPHONE ENCOUNTER
Central Prior Authorization Team   Phone: [phone]    PA Initiation    Medication: Zolpidem (Ambien) 5 mg tablet  Insurance Company: [employer] - Phone [phone] Fax [phone]  Pharmacy Filling the Rx: [facility] #[id number] - [place], [place] - [address] AT [facility] OF [facility] [address]  Filling Pharmacy Phone: [phone]  Filling Pharmacy Fax:    Start Date: 10/17/2022

## 2022-10-18 RX ORDER — ZOLPIDEM TARTRATE 5 MG/1
TABLET ORAL
Qty: 90 TABLET | Refills: 0 | Status: SHIPPED | OUTPATIENT
Start: 2022-10-18 | End: 2022-11-22

## 2022-11-04 ENCOUNTER — TELEPHONE (OUTPATIENT)
Dept: FAMILY MEDICINE | Facility: CLINIC | Age: 57
End: 2022-11-04

## 2022-11-04 NOTE — TELEPHONE ENCOUNTER
S-(situation): Received call from patient requesting medication adjustment    B-(background): Pt states that she has been feeling fatigue for a long time and when she spoke to another proivider they mentioned her doses for clonazepam and zolpidem were high so pt wanted to check with PCP about the dosing.     A-(assessment): Pt reports ongoing fatigue and feeling groggy during the day. Reports no other sx or concerns.     R-(recommendations): Pt wondering if she can taper down on her clonazepam and zolpidem? Patient last seen via VV on 9/12/22    Patient would like a BeautyCon message with provider recommendations.     Pillo You RN

## 2022-11-04 NOTE — TELEPHONE ENCOUNTER
Yes, I would suggest cutting the clonazepam to one half instead of a whole every night and see if this helps.  Please have her update me in about 1 to 2 weeks.    Thank you    Emile Jara M.D.

## 2022-11-08 NOTE — TELEPHONE ENCOUNTER
Patient Contact     Attempt #: 1- mobile number      Was call answered?  No.  Left message on voicemail with information to call clinic back.     Attempt #: 2- sent patient mychart with provider message below.        Dorothy Ortiz RN  [facility]

## 2022-11-09 NOTE — TELEPHONE ENCOUNTER
Patient Contact    Attempt # 3    Was call answered?  No.  Left message on voicemail with information to call triage back.    On callback - please review PCP's instructions below     3 attempts to reach patient, signing encounter     Phybridgehart message also sent to patient with PCP's instructions (see mychart encounter on 11/8/22)    Chioma Chiu RN  [facility]

## 2022-11-22 DIAGNOSIS — F51.04 CHRONIC INSOMNIA: ICD-10-CM

## 2022-11-22 RX ORDER — ZOLPIDEM TARTRATE 5 MG/1
TABLET ORAL
Qty: 90 TABLET | Refills: 0 | Status: SHIPPED | OUTPATIENT
Start: 2022-11-22 | End: 2023-01-16

## 2022-11-22 NOTE — TELEPHONE ENCOUNTER
So I believe she is currently taking one half clonazepam at night.  I would continue this dose for another 2 weeks.  After that I would try it every other night for about 4 weeks and then she can stop it.    Thank you    Emile Jara M.D.

## 2022-11-22 NOTE — TELEPHONE ENCOUNTER
Call to patient. Patient informed of Dr. Jara's below response. Patient verbalized understanding and asks provider to provide a tapering schedule. Patient wants to know, what is the safe way to taper off of Clonazepam?        Call patient back at [phone] (okay to leave detailed message). Also okay to send GMIhart message with provider's response.       Zeinab Calzada RN BSN MSN  [facility]

## 2022-11-22 NOTE — TELEPHONE ENCOUNTER
Pt called back and states this is working well. She wants to continue on the half of Clonazepam. She states she still feels the same level of fatigue. She thinks in the future she may be able to reduce it but at this time she thinks it would be too soon to do so. She has enough medication at this time.     Anaid FERNANDEZ RN  [facility]

## 2022-11-22 NOTE — TELEPHONE ENCOUNTER
Call to patient. Patient informed of Dr. Jara's below response. Patient verbalized understanding and denies questions/concerns.       Zeinab Calzada RN BSN MSN  [facility]

## 2022-11-22 NOTE — TELEPHONE ENCOUNTER
Routing refill request to provider for review/approval because:  Drug not on the [facility] refill protocol   Anaid FERNANDEZ RN  [facility]

## 2022-11-22 NOTE — TELEPHONE ENCOUNTER
Sounds good, it would be better if she were not on both clonazepam and Ambien at night.  If possible I would encourage her to get off the clonazepam.    Thank you    Emile Jara M.D.

## 2022-12-26 ENCOUNTER — HEALTH MAINTENANCE LETTER (OUTPATIENT)
Age: 57
End: 2022-12-26

## 2023-01-16 ENCOUNTER — MYC REFILL (OUTPATIENT)
Dept: FAMILY MEDICINE | Facility: CLINIC | Age: 58
End: 2023-01-16
Payer: COMMERCIAL

## 2023-01-16 DIAGNOSIS — F51.04 CHRONIC INSOMNIA: ICD-10-CM

## 2023-01-17 RX ORDER — ZOLPIDEM TARTRATE 5 MG/1
TABLET ORAL
Qty: 90 TABLET | Refills: 0 | Status: SHIPPED | OUTPATIENT
Start: 2023-01-17 | End: 2023-04-28

## 2023-01-18 ENCOUNTER — TELEPHONE (OUTPATIENT)
Dept: FAMILY MEDICINE | Facility: CLINIC | Age: 58
End: 2023-01-18

## 2023-01-18 ENCOUNTER — VIRTUAL VISIT (OUTPATIENT)
Dept: FAMILY MEDICINE | Facility: CLINIC | Age: 58
End: 2023-01-18
Payer: COMMERCIAL

## 2023-01-18 DIAGNOSIS — U07.1 COVID-19 VIRUS INFECTION: Primary | ICD-10-CM

## 2023-01-18 PROCEDURE — 99213 OFFICE O/P EST LOW 20 MIN: CPT | Mod: CS | Performed by: PHYSICIAN ASSISTANT

## 2023-01-18 NOTE — PROGRESS NOTES
Vero is a 57 year old who is being evaluated via a billable telephone visit.      What phone number would you like to be contacted at? [phone]  How would you like to obtain your AVS? Manuela    Distant Location (provider location):  On-site    Assessment and Plan:     (U07.1) COVID-19 virus infection  (primary encounter diagnosis)  Comment: vaccinated with one booster, mild symptoms, would like paxlovid  Plan: nirmatrelvir and ritonavir (PAXLOVID) therapy         pack  Discussed not taking klonopin while on paxlovid, she will only use ambien     Appropriate counseling was performed given EUA of drug and investigational phase/limited studies.   Full discussion with patient regarding medication options/risks/benefits/common side effects/potential drug interactions.  Discussed Paxlovid has significant risk for drug interactions. We reviewed all prescription/OTC/supplements patient is taking and patient was asked to HOLD the following: klonopin    Avoid alcohol while on paxlovid (and for three days after last dose) due to increased risk of liver inflammation/jaundice.    If worsening symptoms including but not limited to persistent shortness of breath or chest pain, patient instructed to go to emergency room.         Мария Saldaña PA-C        Subjective   Vero is a 57 year old presenting for the following health issues:  Covid Concern      HPI     Vero is seeing me for covid today  Symptoms started yesterday  She is having fatigue, chills, sore throat, diarrhea, cough and body aches  She denies leg swelling, chest pain, sob  She is vaccinated with one booster      COVID-19 Symptom Review  How many days ago did these symptoms start? 1 day ago    Are any of the following symptoms significant for you?    New or worsening difficulty breathing? No    Worsening cough? Yes, it's a dry cough.     Fever or chills? Yes, I felt feverish or had chills.    Headache: YES    Sore throat: YES    Chest pain:  No    Diarrhea: YES    Body aches? YES    What treatments has patient tried? Cough syrup   Does patient live in a nursing home, group home, or shelter? No  Does patient have a way to get food/medications during quarantined? Yes, I have a friend or family member who can help me.      Review of Systems   See above      Objective    Vitals - Patient Reported  Pain Score: Severe Pain (6)  Pain Loc: Throat      Vitals:  No vitals were obtained today due to virtual visit.    Physical Exam     No exam, phone visit        Phone call duration: 11 minutes

## 2023-01-18 NOTE — TELEPHONE ENCOUNTER
RN COVID TREATMENT VISIT  01/18/23    Vero Lua  57 year old  Current weight? 172 lbs    Has the patient been seen by a primary care provider at an [facility] or [facility] within the past two years? Yes.   Have you been in close proximity to/do you have a known exposure to a person with a confirmed case of influenza? No.     Date of positive COVID test (PCR or at home)?  01/18/2023    Current COVID symptoms: fever or chills, cough, muscle or body aches, headache, sore throat, congestion or runny nose and diarrhea    Date COVID symptoms began: 01/17/2023    Do you have any of the following conditions that place you at risk of being very sick from COVID-19? No high risk conditions    Is patient eligible to continue? No, patient is not at risk for being very sick from COVID-19 and informed he/she does not qualify for treatment.    Pt is still interested on discussing COVID-19 Tx. VV was scheduled to discuss Tx.     Pt informed of COVID-19 quarantine and when to seek care at ER.

## 2023-01-18 NOTE — PATIENT INSTRUCTIONS
Stop klonopin while on paxlovid    The FDA has authorized the emergency use of certain medications for patients who are at high risk for progression to severe illness or death from COVID 19. These medications are investigational, and therefore, information is limited as they are still being studied.        COVID-19 Outpatient Treatments    Important: You can NOT be prescribed Molnupiravir or PAXLOVID if you will start taking the medicine more than 5 days after your symptoms have started.      PAXLOVID: https://www.fda.gov/media/390175/download      Please isolate according to CDC guidelines:  https://www.cdc.gov/coronavirus/2019-ncov/your-health/quarantine-isolation.html      PAXLOVID (nimatrelvir/ritonavir)  How it works  Two medicines (nirmatrelvir and ritonavir) are taken together. They stop the virus from growing. Less amount of virus is easier for your body to fight.  Benefits  Lowers risk of hospitalization and death from COVID-19.  How to take  Medicine comes in a daily container with both medicine tablets. Take by mouth twice daily (once in the morning, once at night) for 5 days.  The number of tablets to take varies by patient.  Don't chew or break capsules. Swallow hole.  When to take  Take as soon as possible after positive COVID-19 test result, and within 5 days of your first symptoms.  Who can take it  Patients must be 18 years or older, weigh at least 88 pounds and have tested positive for COVID-19.  Possible side effects  Can cause altered sense of taste, diarhea (loose, watery stools), high blood pressure, muscle aches.  Medication interactions  Several medicines may interact with PAXLOVID and may cause serious side effects.  Tell your care team about all the medicines you take, including prescription and over-the-counter medicines, vitamins and herbal supplements.  Your provider will review your medicines to make sure that you can safely take PAXLOVID.  Cautions  PAXLOVID is not recommended for  patients with severe kidney or liver disease. If you have mild kidney disease, the dose may need to be changed.  If you are pregnant or breastfeeding, talk to your care team about your options.  If you are sexually active, use effective birth control while taking PAXLOVID.          For informational purposes only. Not to replace the advice of your health care provider. Copyright   2022 [facility]. All rights reserved. Clinically reviewed by Jo Khanna. TeamLINKS [id number] - 01/22.

## 2023-04-28 ENCOUNTER — NURSE TRIAGE (OUTPATIENT)
Dept: FAMILY MEDICINE | Facility: CLINIC | Age: 58
End: 2023-04-28
Payer: COMMERCIAL

## 2023-04-28 DIAGNOSIS — F51.04 CHRONIC INSOMNIA: ICD-10-CM

## 2023-04-28 RX ORDER — ZOLPIDEM TARTRATE 5 MG/1
TABLET ORAL
Qty: 90 TABLET | Refills: 0 | Status: SHIPPED | OUTPATIENT
Start: 2023-04-28 | End: 2023-04-28

## 2023-04-28 NOTE — TELEPHONE ENCOUNTER
Provider Response to 2nd Level Triage Request    I have reviewed the RN documentation. My recommendation is:  Please have her make an in person appt with pcp or team member in next week  Please also review when to be seen emergently w/patient    thanks

## 2023-04-28 NOTE — TELEPHONE ENCOUNTER
TO PROVIDER:     Pt called c/o heart burn, asking for Rx or recommendation of what to take beyond Tums/Prilosec      ONSET 1.5 weeks or more   HISTORY: occasionally in past, Tums helped in past. Taking Tums and Prilosec now - improved a little bit   LOCATION: sternum burning   RADIATING PAIN: no   PATTERN: improved with Prilosec, worse throughout the day.   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     RECOMMENDED DISPOSITION:   Callback By PCP Within 1 Hour   Patient says chest pain feels exactly the same as previously diagnosed 'heartburn' and describes burning in chest R/O: gastroesophageal reflux disease (GERD        NO APPOINTMENTS available with any provider today     Please advise routing for 2nd level       Shy ANDERS Triage [facility]     Reason for Disposition    Chest pain    Patient says chest pain feels exactly the same as previously diagnosed 'heartburn' and describes burning in chest and accompanying sour taste in mouth    Additional Information    Negative: Passed out (i.e., fainted, collapsed and was not responding)    Negative: Shock suspected (e.g., cold/pale/clammy skin, too weak to stand, low BP, rapid pulse)    Negative: Visible sweat on face or sweat is dripping down    Negative: SEVERE difficulty breathing (e.g., struggling for each breath, speaks in single words)    Negative: Passed out (i.e., fainted, collapsed and was not responding)    Negative: Difficult to awaken or acting confused (e.g., disoriented, slurred speech)    Negative: Shock suspected (e.g., cold/pale/clammy skin, too weak to stand, low BP, rapid pulse)    Negative: Chest pain lasting longer than 5 minutes and ANY of the following:* Over 44 years  old* Over 30 years old and at least one cardiac risk factor (e.g., diabetes mellitus, high blood pressure, high cholesterol, smoker, or strong family history of heart disease)* History of heart disease (i.e., angina, heart attack, heart failure, bypass surgery, takes nitroglycerin)* Pain is crushing, pressure-like, or heavy    Negative: Heart beating < 50 beats per minute OR > 140 beats per minute    Negative: Visible sweat on face or sweat dripping down face    Negative: Sounds like a life-threatening emergency to the triager    Negative: Followed an injury to chest    Negative: SEVERE chest pain    Negative: Pain also in shoulder(s) or arm(s) or jaw    Negative: Difficulty breathing    Negative: Cocaine use within last 3 days    Negative: Major surgery in the past month    Negative: Hip or leg fracture (broken bone) in past month (or had cast on leg or ankle in past month)    Negative: Illness requiring prolonged bedrest in past month (e.g., immobilization, long hospital stay)    Negative: Long-distance travel in past month (e.g., car, bus, train, plane; with trip lasting 6 or more hours)    Negative: History of prior 'blood clot' in leg or lungs (i.e., deep vein thrombosis, pulmonary embolism)    Negative: History of inherited increased risk of blood clots (e.g., Factor 5 Leiden, Anti-thrombin 3, Protein C or Protein S deficiency, Prothrombin mutation)    Negative: Cancer treatment in the past two months (or has cancer now)    Negative: Heart beating irregularly or very rapidly    Negative: Chest pain lasting longer than 5 minutes and occurred in last 3 days (72 hours) (Exception: feels exactly the same as previously diagnosed heartburn and has accompanying sour taste in mouth)    Negative: Chest pain or 'angina' comes and goes and is happening more often (increasing in frequency) or getting worse (increasing in severity) (Exception: chest pains that last only a few seconds)    Negative: Dizziness or  lightheadedness    Negative: Coughing up blood    Negative: Patient sounds very sick or weak to the triager    Protocols used: ABDOMINAL PAIN - UPPER-A-OH, CHEST PAIN-A-OH

## 2023-04-28 NOTE — TELEPHONE ENCOUNTER
Called patient regarding PA-C message below. Patient verbalized understanding. Scheduled appointment. Discussed symptoms to go to ED if worsening.    Appointments in Next Year    May 01, 2023  1:00 PM  (Arrive by 12:40 PM)  Provider Visit with WILBER Cochran [facility] ([facility] ) [phone]            Linda Madrigal RN on 4/28/2023 at 1:45 PM

## 2023-05-01 ENCOUNTER — OFFICE VISIT (OUTPATIENT)
Dept: FAMILY MEDICINE | Facility: CLINIC | Age: 58
End: 2023-05-01
Payer: COMMERCIAL

## 2023-05-01 VITALS
BODY MASS INDEX: 27.48 KG/M2 | WEIGHT: 181.3 LBS | TEMPERATURE: 97.6 F | DIASTOLIC BLOOD PRESSURE: 78 MMHG | RESPIRATION RATE: 16 BRPM | OXYGEN SATURATION: 99 % | SYSTOLIC BLOOD PRESSURE: 116 MMHG | HEART RATE: 81 BPM | HEIGHT: 68 IN

## 2023-05-01 DIAGNOSIS — F41.9 ANXIETY: ICD-10-CM

## 2023-05-01 DIAGNOSIS — Z12.31 VISIT FOR SCREENING MAMMOGRAM: ICD-10-CM

## 2023-05-01 DIAGNOSIS — K21.00 GASTROESOPHAGEAL REFLUX DISEASE WITH ESOPHAGITIS WITHOUT HEMORRHAGE: Primary | ICD-10-CM

## 2023-05-01 DIAGNOSIS — E03.9 HYPOTHYROIDISM, UNSPECIFIED TYPE: ICD-10-CM

## 2023-05-01 DIAGNOSIS — E55.9 VITAMIN D DEFICIENCY: ICD-10-CM

## 2023-05-01 LAB
FOLATE SERPL-MCNC: 5.9 NG/ML (ref 4.6–34.8)
T4 FREE SERPL-MCNC: 1.21 NG/DL (ref 0.9–1.7)
TSH SERPL DL<=0.005 MIU/L-ACNC: 2.39 UIU/ML (ref 0.3–4.2)
VIT B12 SERPL-MCNC: 698 PG/ML (ref 232–1245)

## 2023-05-01 PROCEDURE — 99214 OFFICE O/P EST MOD 30 MIN: CPT | Performed by: NURSE PRACTITIONER

## 2023-05-01 PROCEDURE — 84443 ASSAY THYROID STIM HORMONE: CPT | Performed by: NURSE PRACTITIONER

## 2023-05-01 PROCEDURE — 82746 ASSAY OF FOLIC ACID SERUM: CPT | Performed by: NURSE PRACTITIONER

## 2023-05-01 PROCEDURE — 36415 COLL VENOUS BLD VENIPUNCTURE: CPT | Performed by: NURSE PRACTITIONER

## 2023-05-01 PROCEDURE — 82607 VITAMIN B-12: CPT | Performed by: NURSE PRACTITIONER

## 2023-05-01 PROCEDURE — 82306 VITAMIN D 25 HYDROXY: CPT | Performed by: NURSE PRACTITIONER

## 2023-05-01 PROCEDURE — 84439 ASSAY OF FREE THYROXINE: CPT | Performed by: NURSE PRACTITIONER

## 2023-05-01 ASSESSMENT — PAIN SCALES - GENERAL: PAINLEVEL: MILD PAIN (3)

## 2023-05-01 ASSESSMENT — PATIENT HEALTH QUESTIONNAIRE - PHQ9
SUM OF ALL RESPONSES TO PHQ QUESTIONS 1-9: 0
SUM OF ALL RESPONSES TO PHQ QUESTIONS 1-9: 0

## 2023-05-01 NOTE — PATIENT INSTRUCTIONS
Supplements to consider getting from your chiropractor. Pending your labs:   B-complex   Vitamin D3 (can start with 2000iu daily or wait for lab levels). Optimal is 50-75  Magnesium oxide or citrate to help with bowels. Can start with about 200mg and work up to a level where your bowels are softer. 500mg is a common amount   FIBER: flax and/or zena seeds. 2 tbsp total per day.    One medication to consider for fibromyalgia is low dose naltrexone (LDN). Look at LDN research trust online.     One thing to consider is the Whole30.     It is ok to do Prilosec up to 6 weeks. Then switch to pepcid if you need continued management.     If symptoms are persisting we can always refer you to an EGD (upper endoscopy)

## 2023-05-02 LAB — DEPRECATED CALCIDIOL+CALCIFEROL SERPL-MC: 36 UG/L (ref 20–75)

## 2023-05-04 NOTE — RESULT ENCOUNTER NOTE
All of your labs look pretty good except your folate (folic acid) is on the low end of normal and Vitamin D could be a little higher as well. Definitely supplement vitamin D in the winter months. You could take a B-complex to support your folate levels. You are not deficient but for optimal levels I would recommend this just once a day or even just a few times a week   Let me know if you have questions   Tiffany

## 2023-05-26 DIAGNOSIS — F51.04 CHRONIC INSOMNIA: ICD-10-CM

## 2023-05-26 RX ORDER — ZOLPIDEM TARTRATE 5 MG/1
TABLET ORAL
Qty: 90 TABLET | Refills: 0 | Status: SHIPPED | OUTPATIENT
Start: 2023-05-26 | End: 2023-07-27

## 2023-07-03 ENCOUNTER — TELEPHONE (OUTPATIENT)
Dept: FAMILY MEDICINE | Facility: CLINIC | Age: 58
End: 2023-07-03
Payer: COMMERCIAL

## 2023-07-03 DIAGNOSIS — K21.9 GASTROESOPHAGEAL REFLUX DISEASE WITHOUT ESOPHAGITIS: Primary | ICD-10-CM

## 2023-07-03 NOTE — TELEPHONE ENCOUNTER
Patient called and asked to leave the following message for PCP:    Having Acid Reflux for 3 months and been taking Prilosec but Acid reflux has not gone away. Dr. Jara do you recommend I schedule and appointment with you or Do you recommend I see a GI doctor and if so can you recommend one for me?  Please give me a call at [phone] okay detailed message okay.    Message routed to PCP.

## 2023-07-03 NOTE — TELEPHONE ENCOUNTER
Placed call.  Information given to patient from provider.  States understood and agreed with advise.  Patient will call to schedule with Gastroenterology  Stefany Hamlin RN

## 2023-07-05 NOTE — TELEPHONE ENCOUNTER
REFERRAL INFORMATION:    Referring Provider:  Homer Marin MD    Referring Clinic:  [place]     Reason for Visit/Diagnosis: Gastroesophageal reflux disease without esophagitis     FUTURE VISIT INFORMATION:    Appointment Date: 8/1/2023    Appointment Time:      NOTES STATUS DETAILS   OFFICE NOTE from Referring Provider Internal 5/1/2023 OV with ANN Rodriguez   OFFICE NOTE from Other Specialist Internal 5/6/2021 VV with SURG BENJAMIN Leslie  7/15/2020 OV with ARIANE Sarah    7/9/2020 VV with LIZBET Cochran   [facility] DISCHARGE SUMMARY/  ED VISITS N/A    OPERATIVE REPORT Internal 3/24/2021 CHOLECYSTECTOMY, LAPAROSCOPIC   MEDICATION LIST Internal         ENDOSCOPY  N/A    COLONOSCOPY Care Everywhere 8/9/2021 [facility]   ERCP N/A    EUS Internal  7/20/2020   STOOL TESTING N/A    PERTINENT LABS N/A    PATHOLOGY REPORTS (RELATED) N/A    IMAGING (CT, MRI, EGD, MRCP, Small Bowel Follow Through/SBT, MR/CT Enterography) Internal 8/13/2020 [facility]   6/9/2020 US BAD   6/4/2020 CT ABD PEL

## 2023-07-15 ENCOUNTER — HEALTH MAINTENANCE LETTER (OUTPATIENT)
Age: 58
End: 2023-07-15

## 2023-07-27 ENCOUNTER — TELEPHONE (OUTPATIENT)
Dept: FAMILY MEDICINE | Facility: CLINIC | Age: 58
End: 2023-07-27
Payer: COMMERCIAL

## 2023-07-27 DIAGNOSIS — F51.04 CHRONIC INSOMNIA: ICD-10-CM

## 2023-07-27 RX ORDER — ZOLPIDEM TARTRATE 5 MG/1
5 TABLET ORAL
Qty: 90 TABLET | Refills: 0 | Status: SHIPPED | OUTPATIENT
Start: 2023-07-27 | End: 2023-09-26

## 2023-07-31 NOTE — TELEPHONE ENCOUNTER
Patient requesting a PA for zolpidem medication, stating she has no medication remaining.     Patient states frustration that PA had not been initiated with refill request, writer unable to find history of PA request for this medication.    Routing to PA team for review and PA initiation, if appropriate.    Callback if needed: [phone] ok to leave a detailed vm    Eveline Camargo RN  -[facility]

## 2023-07-31 NOTE — TELEPHONE ENCOUNTER
Central Prior Authorization Team   Phone: [phone]    PA Initiation    Medication: zolpidem  Insurance Company: [employer] - Phone [phone] Fax [phone]  Pharmacy Filling the Rx: [id number] - [place] - [address] AT [facility] OF  [address]  Filling Pharmacy Phone: [phone]  Filling Pharmacy Fax:    Start Date: 7/31/2023

## 2023-08-01 ENCOUNTER — PRE VISIT (OUTPATIENT)
Dept: GASTROENTEROLOGY | Facility: CLINIC | Age: 58
End: 2023-08-01

## 2023-08-01 ENCOUNTER — OFFICE VISIT (OUTPATIENT)
Dept: GASTROENTEROLOGY | Facility: CLINIC | Age: 58
End: 2023-08-01
Attending: INTERNAL MEDICINE
Payer: COMMERCIAL

## 2023-08-01 VITALS
WEIGHT: 183.5 LBS | HEART RATE: 78 BPM | BODY MASS INDEX: 27.81 KG/M2 | HEIGHT: 68 IN | OXYGEN SATURATION: 100 % | DIASTOLIC BLOOD PRESSURE: 71 MMHG | SYSTOLIC BLOOD PRESSURE: 117 MMHG

## 2023-08-01 DIAGNOSIS — K59.00 CONSTIPATION, UNSPECIFIED CONSTIPATION TYPE: Primary | ICD-10-CM

## 2023-08-01 DIAGNOSIS — K21.9 GASTROESOPHAGEAL REFLUX DISEASE, UNSPECIFIED WHETHER ESOPHAGITIS PRESENT: ICD-10-CM

## 2023-08-01 PROCEDURE — 99214 OFFICE O/P EST MOD 30 MIN: CPT | Performed by: PHYSICIAN ASSISTANT

## 2023-08-01 NOTE — LETTER
8/1/2023         RE: Vero Lua  [address]  [place] [address]        Dear Colleague,    Thank you for referring your patient, Vero Lua, to the [facility]. Please see a copy of my visit note below.    GI CLINIC VISIT    CC/REFERRING MD:  Homer Marni  REASON FOR CONSULTATION: GERD    ASSESSMENT/PLAN:  57-year-old female with past medical history of hypothyroidism, anxiety, presents for evaluation of acid reflux symptoms.    #GERD  Patient developed symptoms of acid reflux in March 2023, she subsequently started taking Prilosec 20 mg daily, and has had mild relief with this.  She is also made some significant lifestyle changes.  She still continues to have symptoms daily generally at night.  She has been taking omeprazole 20 mg usually after breakfast we discussed proper timing of PPI dosing.  At this time we will plan to have her increase the omeprazole to 40 mg daily, 30 to 60 minutes before her first meal in the morning, she can take Pepcid 20 mg 2 times a day for any breakthrough symptoms or at night.  We also discussed GERD lifestyle modifications.  We will trial this for 2 months, if she does not have improvement or resolution of her symptoms, we will plan to obtain an EGD with [facility].  Do not feel she warrants an EGD at this time given no alarm symptoms.  --Increase omeprazole to 40 mg daily, 30 to 60 minutes before your first meal in the morning.  --Can take Pepcid or Tums as needed.  --Review GERD lifestyle modifications.  --Future considerations: Would obtain an EGD with [facility] if no improvement in symptoms.    #constipation  Patient reports a long history of constipation, she will generally have a bowel movement every other day described as a [place] type I with pushing and straining.  Recommended that she start taking MiraLAX 1 capful daily, if her stools are too loose can do 1 capful every other day.  -- Start taking MiraLAX 1 capful daily.  --  Discussed other constipation lifestyle modifications.    Colorectal cancer screening: due 2031    RTC 2 months.    Thank you for this consultation.  It was a pleasure to participate in the care of this patient; please contact us with any further questions.       34 minutes spent on the date of the encounter doing chart review, review of outside records, patient visit, and documentation    This note was created with voice recognition software, and while reviewed for accuracy, typos may remain.    John Schwartz PA-C  Division of Gastroenterology, Hepatology and Nutrition  [facility] 57-year-old female with past medical history of hypothyroidism, anxiety, presents for evaluation of acid reflux symptoms.    Patient reports reflux symptoms began Mid March 2023 - she describes symptoms as a pressure in her chest which radiates up her chest. She notes that symptoms were typically present on a daily basis, and symptoms were constant in nature. She notes that symptoms were generally worse after eating, particularly spicy and greasy foods. She tried taking tums for her symptoms (as she has had occasional heartburn in the past), but did not have much relief. She then started prilosec 20mg daily, she had slight improvement with this - after she had been on this for about 4-6 weeks she saw her PCP for further evaluation. PCP recommended continuation of prilosec 20mg, and patient has noticed improvement in her symptoms. She now does not feel pain constantly. She does take tums every night as she typically feels symptoms worsen at night. She has been sleeping on an incline pillow. She has limited to her diet quite a bit - no spicy, fatty, greasy foods, chocolate. She denies abdominal pain. Denies nausea or vomiting.    She has a long history of constipation, and bloating after she eats, which she has had for years. She generally has a BM every other day. She describes stool as  a [place] type I, and reports pushing/straining to have a BM. Denies BRBPR. She does not take any laxatives.       She takes aleve most day, denies  Tylenol. Denies use of OTC herbal supplements/weight loss products.  Previous history of cholecystectomy.    Denies use of ETOH and tobacco products. No recreational drug use.     Mother was diagnosed with pancreatic cancer at age 78. Denies family history of IBD/celiac disease.     Patient works as an  - off in the summer.     ROS:    No fevers or chills  No weight loss  No shortness of breath or wheezing  No chest pain or pressure  No odynophagia or dysphagia  No BRBPR, hematochezia, melena  No anxiety or depression    PROBLEM LIST  Patient Active Problem List    Diagnosis Date Noted     Chronic insomnia      Priority: Medium     on ambien and klonopin for over 10 years together       Adenomatous polyp of colon      Priority: Medium     fu 5 years, [place] gi       Anxiety      Priority: Medium     Depression, major, in partial remission (H)      Priority: Medium     Fibromyalgia      Priority: Medium     Restless leg syndrome      Priority: Medium     Interstitial cystitis      Priority: Medium     urol [place] and had interstem       Migraines      Priority: Medium     Dr. Cash of neuro       Hypothyroid      Priority: Medium       PERTINENT PAST MEDICAL HISTORY:    Past Medical History:   Diagnosis Date     Adenomatous polyp of colon 05/2016    fu 5 years, [place] gi; fu done 2021 and nl     Anxiety      Chronic insomnia     on ambien and klonopin for over 10 years together     Depression, major, in partial remission (H)     was seeing psyche until 2015, not since then, as of 2022 seeing Dr. Bryant Flower     Dilated bile duct 2020    extrahepatic, seen on ct for ruq pain, then eus done and no stricture seen     Fibromyalgia 1998     Hypothyroid 90's     Interstitial cystitis     urol [place] and had interstem     Migraines     Dr. Cash of neuro, got botox 2012 via  [facility] Dr. Kevin, now Dr. Watkins     Restless leg syndrome      Urgency of urination     interstim       PREVIOUS SURGERIES:    Past Surgical History:   Procedure Laterality Date     COLONOSCOPY       ENDOSCOPIC ULTRASOUND UPPER GASTROINTESTINAL TRACT (GI) N/A 7/20/2020    Procedure: DIAGNOSTIC ENDOSCOPIC ULTRASOUND, ESOPHAGOSCOPY / UPPER GASTROINTESTINAL TRACT;  Surgeon: Juan Hurst MD;  Location: [facility] OR     interstim procedure  2015, 2010 and 2008    [facility]     LAPAROSCOPIC CHOLECYSTECTOMY N/A 3/24/2021    Procedure: CHOLECYSTECTOMY, LAPAROSCOPIC;  Surgeon: William Leslie MD;  Location: [facility] OR       PREVIOUS ENDOSCOPY:  Colonoscopy (2021): see chart.    ALLERGIES:     Allergies   Allergen Reactions     Adhesive Tape Rash     Some tapes and surgical glue       PERTINENT MEDICATIONS:    Current Outpatient Medications:      BOTOX 200 units injection, , Disp: , Rfl:      buPROPion (WELLBUTRIN XL) 150 MG 24 hr tablet, every morning , Disp: , Rfl:      levothyroxine (SYNTHROID/LEVOTHROID) 100 MCG tablet, Take 1 tablet (100 mcg) by mouth daily, Disp: 90 tablet, Rfl: 3     loratadine (CLARITIN) 10 MG tablet, Take 10 mg by mouth daily as needed , Disp: 30 tablet, Rfl: 1     topiramate (TOPAMAX) 200 MG tablet, Take 200 mg by mouth At Bedtime , Disp: , Rfl:      vortioxetine (BRINTELLIX) 10 MG tablet, Take 1 tablet (10 mg) by mouth daily (Patient taking differently: Take 10 mg by mouth At Bedtime), Disp: 90 tablet, Rfl: 3     zolpidem (AMBIEN) 5 MG tablet, Take 1 tablet (5 mg) by mouth nightly as needed, Disp: 90 tablet, Rfl: 0    SOCIAL HISTORY:    Social History     Socioeconomic History     Marital status:      Spouse name: Not on file     Number of children: 1     Years of education: Not on file     Highest education level: Not on file   Occupational History     Occupation: homemaker     Employer: NONE      Occupation: teacher   Tobacco Use     Smoking status: Never     Smokeless tobacco:  "Never   Substance and Sexual Activity     Alcohol use: No     Alcohol/week: 0.0 standard drinks of alcohol     Drug use: No     Sexual activity: Yes     Partners: Male     Birth control/protection: Post-menopausal   Other Topics Concern     Parent/sibling w/ CABG, MI or angioplasty before 65F 55M? Not Asked   Social History Narrative     Not on file     Social Determinants of Health     Financial Resource Strain: Not on file   Food Insecurity: Not on file   Transportation Needs: Not on file   Physical Activity: Not on file   Stress: Not on file   Social Connections: Not on file   Intimate Partner Violence: Not on file   Housing Stability: Not on file       FAMILY HISTORY:  FH of CRC: None.  FH of IBD: None.  Family History   Problem Relation Age of Onset     Pancreatic Cancer Mother      Depression Sister      Depression Father      Lymphoma Father      No Known Problems Brother      Osteoporosis Maternal Grandmother      Deep Vein Thrombosis No family hx of      Anesthesia Reaction No family hx of        Past/family/social history reviewed and no changes    PHYSICAL EXAMINATION:  Constitutional: aaox3, cooperative, pleasant, not dyspneic/diaphoretic, no acute distress  Vitals reviewed: /71 (BP Location: Left arm, Patient Position: Sitting, Cuff Size: Adult Regular)   Pulse 78   Ht 1.727 m (5' 8\")   Wt 83.2 kg (183 lb 8 oz)   LMP 06/27/2012   SpO2 100%   BMI 27.90 kg/m    Wt:   Wt Readings from Last 2 Encounters:   05/01/23 82.2 kg (181 lb 4.8 oz)   05/23/22 84.4 kg (186 lb)      Eyes: Sclera anicteric/injected  Respiratory: Unlabored breathing  Skin: warm, perfused, no jaundice  Psych: Normal affect  MSK: Normal gait        Again, thank you for allowing me to participate in the care of your patient.        Sincerely,        John Schwartz PA-C  "

## 2023-08-01 NOTE — TELEPHONE ENCOUNTER
Prior Authorization Approval    Authorization Effective Date: 7/1/2023  Authorization Expiration Date: 7/30/2024  Medication: zolpidem  Approved Dose/Quantity:    Reference #:     Insurance Company: [employer] - Phone [phone] Fax [phone]  Expected CoPay:       CoPay Card Available:      Foundation Assistance Needed:    Which Pharmacy is filling the prescription (Not needed for infusion/clinic administered): [id number] - [place], [place] - [address] AT [place] [address] & [facility]  Pharmacy Notified: Yes  Patient Notified:  Pharmacy will notify patient when medication is ready to be picked up

## 2023-08-01 NOTE — PATIENT INSTRUCTIONS
It was a pleasure taking care of you today.  I've included a brief summary of our discussion and care plan from today's visit below.  Please review this information with your primary care provider.  ______________________________________________________________________    My recommendations are summarized as follows:  -increase omeprazole to 40mg daily, take this 30-60 minutes before your first meal in the morning.  -you can take pepcid 20mg as needed two times a day for, breakthrough symptoms.     Lifestyle modifications for gastroesophageal reflux disease (GERD).     1. Change your eating habits.  -- It's best to eat several small meals instead of two or three large meals.  -- After you eat, wait 2 to 3 hours before you lie down. Late-night snacks aren't a good idea.  -- Chocolate, mint, and alcohol can make GERD worse. They relax the valve between the esophagus and the stomach.  -- Spicy foods, fatty foods, foods that have a lot of acid (like tomatoes and oranges), and coffee can make GERD symptoms worse in some people. If your symptoms are worse after you eat a certain food, you may want to stop eating that food to see if your symptoms get better.    2. Do not smoke or chew tobacco.    3. If you have GERD symptoms at night, raise the head of your bed 6 in. (15 cm) to 8 in. (20 cm) by putting the frame on blocks or placing a foam wedge under the head of your mattress. (Adding extra pillows does not work.)    4. Avoid or reduce pressure on your stomach. Don't wear tight clothing around your middle.    5. Lose weight if you need to. Losing just 5 to 10 pounds can help.    In regards to constipation, please start taking miralax, 1 capful daily. If stools are too loose can take it every other day.    -- please see scheduling information provided below     Return to GI Clinic in 2 months to review your progress.    ______________________________________________________________________    [facility] labs,  imaging studies, or procedures that were ordered in clinic today?     Labs: To schedule lab appointment at the [facility], use my chart or call [phone]. If you have a [place] lab closer to home where you are regularly seen you can give them a call.     Procedures: If a colonoscopy, upper endoscopy, breath test, esophageal manometry, or pH impedence was ordered today, our endoscopy team will call you to schedule this. If you have not heard from our endoscopy team within a week, please call [phone] to schedule.     Imaging Studies: If you were scheduled for a CT scan, X-ray, MRI, ultrasound, HIDA scan or other imaging study, please call [phone] to have this scheduled.     Referral: If a referral to another specialty was ordered, expect a phone call or follow instructions above. If you have not heard from anyone regarding your referral in a week, please call our clinic to check the status.     Who do I call with any questions after my visit?  Please be in touch if there are any further questions that arise following today's visit.  There are multiple ways to contact your gastroenterology care team.      During business hours, you may reach a Gastroenterology nurse at [phone]    To schedule or reschedule an appointment, please call [phone].     You can always send a secure message through Smart Adventure.  Smart Adventure messages are answered by your nurse or doctor typically within 24 hours.  Please allow extra time on weekends and holidays.      For urgent/emergent questions after business hours, you may reach the on-call GI Fellow by contacting the [facility]  at [phone].     How will I get the results of any tests ordered?    You will receive all of your results.  If you have signed up for Smart Adventure, any tests ordered at your visit will be available to you after your provider reviews them.  Typically this takes 1-2 weeks.  If there  are urgent results that require a change in your care plan, your provider or nurse will call you to discuss the next steps.      What is ZoomSaferhart?  The Switch is a secure way for you to access all of your healthcare records from the [facility].  It is a web based computer program, so you can sign on to it from any location.  It also allows you to send secure messages to your care team.  I recommend signing up for The Switch access if you have not already done so and are comfortable with using a computer.      How to I schedule a follow-up visit?  If you did not schedule a follow-up visit today, please call [phone] to schedule a follow-up office visit.      Sincerely,    John Schwartz PA-C  Division of Gastroenterology, Hepatology & Nutrition  [facility]

## 2023-08-01 NOTE — PROGRESS NOTES
GI CLINIC VISIT    CC/REFERRING MD:  Homer Marin  REASON FOR CONSULTATION: GERD    ASSESSMENT/PLAN:  57-year-old female with past medical history of hypothyroidism, anxiety, presents for evaluation of acid reflux symptoms.    #GERD  Patient developed symptoms of acid reflux in March 2023, she subsequently started taking Prilosec 20 mg daily, and has had mild relief with this.  She is also made some significant lifestyle changes.  She still continues to have symptoms daily generally at night.  She has been taking omeprazole 20 mg usually after breakfast we discussed proper timing of PPI dosing.  At this time we will plan to have her increase the omeprazole to 40 mg daily, 30 to 60 minutes before her first meal in the morning, she can take Pepcid 20 mg 2 times a day for any breakthrough symptoms or at night.  We also discussed GERD lifestyle modifications.  We will trial this for 2 months, if she does not have improvement or resolution of her symptoms, we will plan to obtain an EGD with [facility].  Do not feel she warrants an EGD at this time given no alarm symptoms.  --Increase omeprazole to 40 mg daily, 30 to 60 minutes before your first meal in the morning.  --Can take Pepcid or Tums as needed.  --Review GERD lifestyle modifications.  --Future considerations: Would obtain an EGD with [facility] if no improvement in symptoms.    #constipation  Patient reports a long history of constipation, she will generally have a bowel movement every other day described as a [place] type I with pushing and straining.  Recommended that she start taking MiraLAX 1 capful daily, if her stools are too loose can do 1 capful every other day.  -- Start taking MiraLAX 1 capful daily.  -- Discussed other constipation lifestyle modifications.    Colorectal cancer screening: due 2031    RTC 2 months.    Thank you for this consultation.  It was a pleasure to participate in the care of this patient; please contact us with any further  questions.       34 minutes spent on the date of the encounter doing chart review, review of outside records, patient visit, and documentation    This note was created with voice recognition software, and while reviewed for accuracy, typos may remain.    John Schwartz PA-C  Division of Gastroenterology, Hepatology and Nutrition  [facility]      HPI 57-year-old female with past medical history of hypothyroidism, anxiety, presents for evaluation of acid reflux symptoms.    Patient reports reflux symptoms began Mid March 2023 - she describes symptoms as a pressure in her chest which radiates up her chest. She notes that symptoms were typically present on a daily basis, and symptoms were constant in nature. She notes that symptoms were generally worse after eating, particularly spicy and greasy foods. She tried taking tums for her symptoms (as she has had occasional heartburn in the past), but did not have much relief. She then started prilosec 20mg daily, she had slight improvement with this - after she had been on this for about 4-6 weeks she saw her PCP for further evaluation. PCP recommended continuation of prilosec 20mg, and patient has noticed improvement in her symptoms. She now does not feel pain constantly. She does take tums every night as she typically feels symptoms worsen at night. She has been sleeping on an incline pillow. She has limited to her diet quite a bit - no spicy, fatty, greasy foods, chocolate. She denies abdominal pain. Denies nausea or vomiting.    She has a long history of constipation, and bloating after she eats, which she has had for years. She generally has a BM every other day. She describes stool as a bristol type I, and reports pushing/straining to have a BM. Denies BRBPR. She does not take any laxatives.       She takes aleve most day, denies  Tylenol. Denies use of OTC herbal supplements/weight loss products.  Previous history of  cholecystectomy.    Denies use of ETOH and tobacco products. No recreational drug use.     Mother was diagnosed with pancreatic cancer at age 78. Denies family history of IBD/celiac disease.     Patient works as an  - off in the summer.     ROS:    No fevers or chills  No weight loss  No shortness of breath or wheezing  No chest pain or pressure  No odynophagia or dysphagia  No BRBPR, hematochezia, melena  No anxiety or depression    PROBLEM LIST  Patient Active Problem List    Diagnosis Date Noted    Chronic insomnia      Priority: Medium     on ambien and klonopin for over 10 years together      Adenomatous polyp of colon      Priority: Medium     fu 5 years, mn gi      Anxiety      Priority: Medium    Depression, major, in partial remission (H)      Priority: Medium    Fibromyalgia      Priority: Medium    Restless leg syndrome      Priority: Medium    Interstitial cystitis      Priority: Medium     urol [place] and had interstem      Migraines      Priority: Medium     Dr. Cash of neuro      Hypothyroid      Priority: Medium       PERTINENT PAST MEDICAL HISTORY:    Past Medical History:   Diagnosis Date    Adenomatous polyp of colon 05/2016    fu 5 years, mn gi; fu done 2021 and nl    Anxiety     Chronic insomnia     on ambien and klonopin for over 10 years together    Depression, major, in partial remission (H)     was seeing psyche until 2015, not since then, as of 2022 seeing Dr. Bryant Flower    Dilated bile duct 2020    extrahepatic, seen on ct for ruq pain, then eus done and no stricture seen    Fibromyalgia 1998    Hypothyroid 90's    Interstitial cystitis     urol [place] and had interstem    Migraines     Dr. Cash of neuro, got botox 2012 via [facility] Dr. Kevin, now Dr. Watkins    Restless leg syndrome     Urgency of urination     interstim       PREVIOUS SURGERIES:    Past Surgical History:   Procedure Laterality Date    COLONOSCOPY      ENDOSCOPIC ULTRASOUND UPPER GASTROINTESTINAL TRACT (GI) N/A  7/20/2020    Procedure: DIAGNOSTIC ENDOSCOPIC ULTRASOUND, ESOPHAGOSCOPY / UPPER GASTROINTESTINAL TRACT;  Surgeon: Juan Hurst MD;  Location: [facility] OR    interstim procedure  2015, 2010 and 2008    [facility]    LAPAROSCOPIC CHOLECYSTECTOMY N/A 3/24/2021    Procedure: CHOLECYSTECTOMY, LAPAROSCOPIC;  Surgeon: William Leslie MD;  Location: [facility] OR       PREVIOUS ENDOSCOPY:  Colonoscopy (2021): see chart.    ALLERGIES:     Allergies   Allergen Reactions    Adhesive Tape Rash     Some tapes and surgical glue       PERTINENT MEDICATIONS:    Current Outpatient Medications:     BOTOX 200 units injection, , Disp: , Rfl:     buPROPion (WELLBUTRIN XL) 150 MG 24 hr tablet, every morning , Disp: , Rfl:     levothyroxine (SYNTHROID/LEVOTHROID) 100 MCG tablet, Take 1 tablet (100 mcg) by mouth daily, Disp: 90 tablet, Rfl: 3    loratadine (CLARITIN) 10 MG tablet, Take 10 mg by mouth daily as needed , Disp: 30 tablet, Rfl: 1    topiramate (TOPAMAX) 200 MG tablet, Take 200 mg by mouth At Bedtime , Disp: , Rfl:     vortioxetine (BRINTELLIX) 10 MG tablet, Take 1 tablet (10 mg) by mouth daily (Patient taking differently: Take 10 mg by mouth At Bedtime), Disp: 90 tablet, Rfl: 3    zolpidem (AMBIEN) 5 MG tablet, Take 1 tablet (5 mg) by mouth nightly as needed, Disp: 90 tablet, Rfl: 0    SOCIAL HISTORY:    Social History     Socioeconomic History    Marital status:      Spouse name: Not on file    Number of children: 1    Years of education: Not on file    Highest education level: Not on file   Occupational History    Occupation: homemaker     Employer: NONE     Occupation: teacher   Tobacco Use    Smoking status: Never    Smokeless tobacco: Never   Substance and Sexual Activity    Alcohol use: No     Alcohol/week: 0.0 standard drinks of alcohol    Drug use: No    Sexual activity: Yes     Partners: Male     Birth control/protection: Post-menopausal   Other Topics Concern    Parent/sibling w/ CABG, MI or angioplasty  "before 65F 55M? Not Asked   Social History Narrative    Not on file     Social Determinants of Health     Financial Resource Strain: Not on file   Food Insecurity: Not on file   Transportation Needs: Not on file   Physical Activity: Not on file   Stress: Not on file   Social Connections: Not on file   Intimate Partner Violence: Not on file   Housing Stability: Not on file       FAMILY HISTORY:  FH of CRC: None.  FH of IBD: None.  Family History   Problem Relation Age of Onset    Pancreatic Cancer Mother     Depression Sister     Depression Father     Lymphoma Father     No Known Problems Brother     Osteoporosis Maternal Grandmother     Deep Vein Thrombosis No family hx of     Anesthesia Reaction No family hx of        Past/family/social history reviewed and no changes    PHYSICAL EXAMINATION:  Constitutional: aaox3, cooperative, pleasant, not dyspneic/diaphoretic, no acute distress  Vitals reviewed: /71 (BP Location: Left arm, Patient Position: Sitting, Cuff Size: Adult Regular)   Pulse 78   Ht 1.727 m (5' 8\")   Wt 83.2 kg (183 lb 8 oz)   LMP 06/27/2012   SpO2 100%   BMI 27.90 kg/m    Wt:   Wt Readings from Last 2 Encounters:   05/01/23 82.2 kg (181 lb 4.8 oz)   05/23/22 84.4 kg (186 lb)      Eyes: Sclera anicteric/injected  Respiratory: Unlabored breathing  Skin: warm, perfused, no jaundice  Psych: Normal affect  MSK: Normal gait      "

## 2023-09-09 NOTE — PROGRESS NOTES
Assessment & Plan     (K21.00) Gastroesophageal reflux disease with esophagitis without hemorrhage  (primary encounter diagnosis)  Comment: We will start with diet changes and Prilosec.  Discussed that she can take this up to 6 weeks but would be preferable to switch to Pepcid if she has to continue.  May need referral to EGD in the future if symptoms are persisting  Plan:     (E03.9) Hypothyroidism, unspecified type  Comment: Due for labs  Plan: TSH, T4, free            (F41.9) Anxiety  Comment: Discussed holistic ways to optimize mental health.  We will check vitamins as noted.  Plan: Vitamin B12, Folate            (E55.9) Vitamin D deficiency  Comment: checking lab today   Plan: Vitamin D Deficiency            (Z12.31) Visit for screening mammogram  Comment: will be due at the end of the month  Plan: MA SCREENING DIGITAL BILAT - Future  (s+30)              Patient Instructions   Supplements to consider getting from your chiropractor. Pending your labs:   1. B-complex   2. Vitamin D3 (can start with 2000iu daily or wait for lab levels). Optimal is 50-75  3. Magnesium oxide or citrate to help with bowels. Can start with about 200mg and work up to a level where your bowels are softer. 500mg is a common amount   4. FIBER: flax and/or zena seeds. 2 tbsp total per day.    One medication to consider for fibromyalgia is low dose naltrexone (LDN). Look at LDN research trust online.     One thing to consider is the Whole30.     It is ok to do Prilosec up to 6 weeks. Then switch to pepcid if you need continued management.     If symptoms are persisting we can always refer you to an EGD (upper endoscopy)            WILBER [facility] ERNESTINE Pham is a 57 year old, presenting for the following health issues:  Abdominal Pain    History of Present Illness       Reason for visit:  Acid reflux/heartburn  Symptom onset:  1-2 weeks ago    She eats 2-3 servings of fruits and  "vegetables daily.She consumes 0 sweetened beverage(s) daily.She exercises with enough effort to increase her heart rate 9 or less minutes per day.  She exercises with enough effort to increase her heart rate 3 or less days per week.   She is taking medications regularly.    Today's PHQ-9         PHQ-9 Total Score: 0    PHQ-9 Q9 Thoughts of better off dead/self-harm past 2 weeks :   Not at all         Heartburn for a couple weeks. Started with Tums-didn't help.  Prilosec for 12 days hasn't really helped much   Trying to avoid spicy which she does a fair amt  No coffee.    Does drink diet coke daily   No alcohol or smoking.   Gets rare heartburn in the past     Has a lot of stress at work the last few months. 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 Elevates her head   Has constipation. Normal for her but worse lately.   Takes laxative. Occasional enema. Never has taken anything daily, just rescue.   No N/V     Stopped clonazepam in March 2023   Has a psychiatrist     EGD about 1 year ago.     Sees a chiropractor working on fatigue: on minerals.  Was on other supplements but is weaning down.         Review of Systems   Detailed as above         Objective    /78 (BP Location: Right arm, Patient Position: Sitting, Cuff Size: Adult Regular)   Pulse 81   Temp 97.6  F (36.4  C)   Resp 16   Ht 1.715 m (5' 7.5\")   Wt 82.2 kg (181 lb 4.8 oz)   LMP 06/27/2012   SpO2 99%   BMI 27.98 kg/m    There is no height or weight on file to calculate BMI.  Physical Exam  Constitutional:       Appearance: Normal appearance.   Pulmonary:      Effort: Pulmonary effort is normal.   Neurological:      Mental Status: She is alert.   Psychiatric:         Mood and Affect: Mood normal.                    " .

## 2023-09-17 ENCOUNTER — HEALTH MAINTENANCE LETTER (OUTPATIENT)
Age: 58
End: 2023-09-17

## 2023-09-26 DIAGNOSIS — F51.04 CHRONIC INSOMNIA: ICD-10-CM

## 2023-09-26 RX ORDER — ZOLPIDEM TARTRATE 5 MG/1
5 TABLET ORAL
Qty: 90 TABLET | Refills: 0 | Status: SHIPPED | OUTPATIENT
Start: 2023-09-26 | End: 2024-08-19

## 2023-09-26 NOTE — TELEPHONE ENCOUNTER
TO PCP    Patient calling stating she needs a refill on zolpidem.     Rx and pharmacy pended for your Review.     Linda Madrigal RN on 9/26/2023 at 12:31 PM     [Nl] : Integumentary

## 2023-10-09 DIAGNOSIS — E03.9 HYPOTHYROIDISM, UNSPECIFIED TYPE: ICD-10-CM

## 2023-10-09 RX ORDER — LEVOTHYROXINE SODIUM 100 UG/1
100 TABLET ORAL DAILY
Qty: 90 TABLET | Refills: 0 | Status: SHIPPED | OUTPATIENT
Start: 2023-10-09 | End: 2024-01-08

## 2023-11-09 ENCOUNTER — OFFICE VISIT (OUTPATIENT)
Dept: GASTROENTEROLOGY | Facility: CLINIC | Age: 58
End: 2023-11-09
Attending: PHYSICIAN ASSISTANT
Payer: COMMERCIAL

## 2023-11-09 VITALS
BODY MASS INDEX: 28.86 KG/M2 | DIASTOLIC BLOOD PRESSURE: 77 MMHG | OXYGEN SATURATION: 99 % | SYSTOLIC BLOOD PRESSURE: 111 MMHG | WEIGHT: 189.8 LBS | HEART RATE: 74 BPM

## 2023-11-09 DIAGNOSIS — K59.00 CONSTIPATION, UNSPECIFIED CONSTIPATION TYPE: ICD-10-CM

## 2023-11-09 DIAGNOSIS — K21.9 GASTROESOPHAGEAL REFLUX DISEASE, UNSPECIFIED WHETHER ESOPHAGITIS PRESENT: Primary | ICD-10-CM

## 2023-11-09 PROCEDURE — 99214 OFFICE O/P EST MOD 30 MIN: CPT | Performed by: PHYSICIAN ASSISTANT

## 2023-11-09 ASSESSMENT — PAIN SCALES - GENERAL: PAINLEVEL: NO PAIN (0)

## 2023-11-09 NOTE — LETTER
11/9/2023         RE: Vero Lua  [address]  [place] [address]        Dear Colleague,    Thank you for referring your patient, Vero Lua, to the [facility]. Please see a copy of my visit note below.    GI CLINIC VISIT    CC/REFERRING MD:  Homer Marin  REASON FOR CONSULTATION: GERD    ASSESSMENT/PLAN:  57-year-old female with past medical history of hypothyroidism, anxiety, presents for evaluation of acid reflux symptoms.    #GERD patient  Patient reports that she has noticed significant improvement in her reflux symptoms with taking omeprazole 40 mg daily, she has also been trying to avoid spicy foods as well which is quite helpful.  At this time we will plan to try to titrate her off of the omeprazole, recommended that she go down to 20 mg for 1 to 2 weeks, did discuss that she may have some rebound symptoms and she can take Pepcid or Tums as needed.  If she continues to have resolution of symptoms, she can even go off of the medication. If the symptoms are persistent after the decrease of the dosage I would like her to let me know as at that time we may need to obtain an EGD.  --start to decrease dose of omeprazole -- go down to 20mg daily -- if symptoms still well controlled on this for 1-2 weeks, then can go off of it completely -- if persistent symptoms, will obtain an EGD off therapy.  --Can take Pepcid or Tums as needed.  --Review GERD lifestyle modifications.  --Future considerations: Would obtain an EGD off therapy if symptoms return after being off omeprazole.    #constipation  Patient reports improvement in constipation symptoms.  She will generally have a bowel movement every other day and stool is described as a Simpson type III.  She does endorse pushing and straining to have a bowel movement.  She notes that she does not take MiraLAX consistently and I encouraged her to take MiraLAX 1 capful daily or every other day.  I also encouraged her  to increase her fiber intake.  -- Start taking MiraLAX 1 capful daily.  -- Discussed other constipation lifestyle modifications.    Colorectal cancer screening: due 2031    RTC 3 months.    Thank you for this consultation.  It was a pleasure to participate in the care of this patient; please contact us with any further questions.       30 minutes spent on the date of the encounter doing chart review, review of outside records, patient visit, and documentation    This note was created with voice recognition software, and while reviewed for accuracy, typos may remain.    John Schwartz PA-C  Division of Gastroenterology, Hepatology and Nutrition  [facility]      HPI 57-year-old female with past medical history of hypothyroidism, anxiety, presents for follow-up of acid reflux symptoms.    Patient was initially evaluated on 8/1/23, please see visit details below:    Patient reports reflux symptoms began Mid March 2023 - she describes symptoms as a pressure in her chest which radiates up her chest. She notes that symptoms were typically present on a daily basis, and symptoms were constant in nature. She notes that symptoms were generally worse after eating, particularly spicy and greasy foods. She tried taking tums for her symptoms (as she has had occasional heartburn in the past), but did not have much relief. She then started prilosec 20mg daily, she had slight improvement with this - after she had been on this for about 4-6 weeks she saw her PCP for further evaluation. PCP recommended continuation of prilosec 20mg, and patient has noticed improvement in her symptoms. She now does not feel pain constantly. She does take tums every night as she typically feels symptoms worsen at night. She has been sleeping on an incline pillow. She has limited to her diet quite a bit - no spicy, fatty, greasy foods, chocolate. She denies abdominal pain. Denies nausea or vomiting.    She has  a long history of constipation, and bloating after she eats, which she has had for years. She generally has a BM every other day. She describes stool as a bristol type I, and reports pushing/straining to have a BM. Denies BRBPR. She does not take any laxatives.       She takes aleve most day, denies  Tylenol. Denies use of OTC herbal supplements/weight loss products.  Previous history of cholecystectomy.    Denies use of ETOH and tobacco products. No recreational drug use.     Mother was diagnosed with pancreatic cancer at age 78. Denies family history of IBD/celiac disease.     Patient works as an  - off in the summer.     11/9/23:  Patient reports that he reflux is fairly well controlled - she has been taking omeprazole 40mg daily, in the morning. She does have some mild symptoms when she eats spicy foods, but she has been trying to avoid these in generally. Denies abdominal pain, nausea, or vomiting. She reports normal appetitie.     In regards to her bowel movements, she generally has a bowel movement usually 3 days a week (EOD), she describes the stool as a bristol type 3. She does endorse pushing/straining to have a bowel movement. Denies BRBPR. She is more so taking miralax to needed.     ROS:    No fevers or chills  No weight loss  No shortness of breath or wheezing  No chest pain or pressure  No odynophagia or dysphagia  No BRBPR, hematochezia, melena  No anxiety or depression    PROBLEM LIST  Patient Active Problem List    Diagnosis Date Noted     Chronic insomnia      Priority: Medium     on ambien and klonopin for over 10 years together       Adenomatous polyp of colon      Priority: Medium     fu 5 years, mn gi       Anxiety      Priority: Medium     Depression, major, in partial remission (H24)      Priority: Medium     Fibromyalgia      Priority: Medium     Restless leg syndrome      Priority: Medium     Interstitial cystitis      Priority: Medium     [facility] [place] [facility]        Migraines      Priority: Medium     Dr. Cash of neuro       Hypothyroid      Priority: Medium       PERTINENT PAST MEDICAL HISTORY:    Past Medical History:   Diagnosis Date     Adenomatous polyp of colon 05/2016    fu 5 years, mn gi; fu done 2021 and nl     Anxiety      Chronic insomnia     on ambien and klonopin for over 10 years together     Depression, major, in partial remission (H)     was seeing psyche until 2015, not since then, as of 2022 seeing Dr. Bryant Flower     Dilated bile duct 2020    extrahepatic, seen on ct for ruq pain, then eus done and no stricture seen     Fibromyalgia 1998     Hypothyroid 90's     Interstitial cystitis     urol [place] and had interstem     Migraines     Dr. Cash of neuro, got botox 2012 via [facility] Dr. Kevin, now Dr. Watkins     Restless leg syndrome      Urgency of urination     interstim       PREVIOUS SURGERIES:    Past Surgical History:   Procedure Laterality Date     COLONOSCOPY       ENDOSCOPIC ULTRASOUND UPPER GASTROINTESTINAL TRACT (GI) N/A 7/20/2020    Procedure: DIAGNOSTIC ENDOSCOPIC ULTRASOUND, ESOPHAGOSCOPY / UPPER GASTROINTESTINAL TRACT;  Surgeon: Juan Hurst MD;  Location: [facility] OR     interstim procedure  2015, 2010 and 2008    [facility]     LAPAROSCOPIC CHOLECYSTECTOMY N/A 3/24/2021    Procedure: CHOLECYSTECTOMY, LAPAROSCOPIC;  Surgeon: William Leslie MD;  [place] [facility] [place]       PREVIOUS ENDOSCOPY:  Colonoscopy (2021): see chart.    ALLERGIES:     Allergies   Allergen Reactions     Adhesive Tape Rash     Some tapes and surgical glue       PERTINENT MEDICATIONS:    Current Outpatient Medications:      BOTOX 200 units injection, , Disp: , Rfl:      buPROPion (WELLBUTRIN XL) 150 MG 24 hr tablet, every morning , Disp: , Rfl:      levothyroxine (SYNTHROID/LEVOTHROID) 100 MCG tablet, TAKE 1 TABLET(100 MCG) BY MOUTH DAILY, Disp: 90 tablet, Rfl: 0     loratadine (CLARITIN) 10 MG tablet, Take 10 mg by mouth daily as needed , Disp: 30 tablet, Rfl: 1      topiramate (TOPAMAX) 200 MG tablet, Take 200 mg by mouth At Bedtime , Disp: , Rfl:      vortioxetine (BRINTELLIX) 10 MG tablet, Take 1 tablet (10 mg) by mouth daily (Patient taking differently: Take 10 mg by mouth At Bedtime), Disp: 90 tablet, Rfl: 3     zolpidem (AMBIEN) 5 MG tablet, Take 1 tablet (5 mg) by mouth nightly as needed, Disp: 90 tablet, Rfl: 0    SOCIAL HISTORY:    Social History     Socioeconomic History     Marital status:      Spouse name: Not on file     Number of children: 1     Years of education: Not on file     Highest education level: Not on file   Occupational History     Occupation: homemaker     Employer: NONE      Occupation: teacher   Tobacco Use     Smoking status: Never     Smokeless tobacco: Never   Substance and Sexual Activity     Alcohol use: No     Alcohol/week: 0.0 standard drinks of alcohol     Drug use: No     Sexual activity: Yes     Partners: Male     Birth control/protection: Post-menopausal   Other Topics Concern     Parent/sibling w/ CABG, MI or angioplasty before 65F 55M? Not Asked   Social History Narrative     Not on file     Social Determinants of Health     Financial Resource Strain: Not on file   Food Insecurity: Not on file   Transportation Needs: Not on file   Physical Activity: Not on file   Stress: Not on file   Social Connections: Not on file   Interpersonal Safety: Not on file   Housing Stability: Not on file       FAMILY HISTORY:  FH of CRC: None.  FH of IBD: None.  Family History   Problem Relation Age of Onset     Pancreatic Cancer Mother      Depression Sister      Depression Father      Lymphoma Father      No Known Problems Brother      Osteoporosis Maternal Grandmother      Deep Vein Thrombosis No family hx of      Anesthesia Reaction No family hx of        Past/family/social history reviewed and no changes    PHYSICAL EXAMINATION:  Constitutional: aaox3, cooperative, pleasant, not dyspneic/diaphoretic, no acute distress  Vitals reviewed: /77  (BP Location: Left arm, Patient Position: Sitting, Cuff Size: Adult Regular)   Pulse 74   Wt 86.1 kg (189 lb 12.8 oz)   LMP 06/27/2012   SpO2 99%   BMI 28.86 kg/m    Wt:   Wt Readings from Last 2 Encounters:   08/01/23 83.2 kg (183 lb 8 oz)   05/01/23 82.2 kg (181 lb 4.8 oz)      Eyes: Sclera anicteric/injected  Respiratory: Unlabored breathing  Skin: warm, perfused, no jaundice  Psych: Normal affect  MSK: Normal gait        Again, thank you for allowing me to participate in the care of your patient.        Sincerely,        John Schwartz PA-C

## 2023-11-09 NOTE — PROGRESS NOTES
GI CLINIC VISIT    CC/REFERRING MD:  Homer Marin  REASON FOR CONSULTATION: GERD    ASSESSMENT/PLAN:  57-year-old female with past medical history of hypothyroidism, anxiety, presents for evaluation of acid reflux symptoms.    #GERD patient  Patient reports that she has noticed significant improvement in her reflux symptoms with taking omeprazole 40 mg daily, she has also been trying to avoid spicy foods as well which is quite helpful.  At this time we will plan to try to titrate her off of the omeprazole, recommended that she go down to 20 mg for 1 to 2 weeks, did discuss that she may have some rebound symptoms and she can take Pepcid or Tums as needed.  If she continues to have resolution of symptoms, she can even go off of the medication. If the symptoms are persistent after the decrease of the dosage I would like her to let me know as at that time we may need to obtain an EGD.  --start to decrease dose of omeprazole -- go down to 20mg daily -- if symptoms still well controlled on this for 1-2 weeks, then can go off of it completely -- if persistent symptoms, will obtain an EGD off therapy.  --Can take Pepcid or Tums as needed.  --Review GERD lifestyle modifications.  --Future considerations: Would obtain an EGD off therapy if symptoms return after being off omeprazole.    #constipation  Patient reports improvement in constipation symptoms.  She will generally have a bowel movement every other day and stool is described as a Y[place] type III.  She does endorse pushing and straining to have a bowel movement.  She notes that she does not take MiraLAX consistently and I encouraged her to take MiraLAX 1 capful daily or every other day.  I also encouraged her to increase her fiber intake.  -- Start taking MiraLAX 1 capful daily.  -- Discussed other constipation lifestyle modifications.    Colorectal cancer screening: due 2031    RT 3 months.    Thank you for this consultation.  It was a pleasure to  participate in the care of this patient; please contact us with any further questions.       30 minutes spent on the date of the encounter doing chart review, review of outside records, patient visit, and documentation    This note was created with voice recognition software, and while reviewed for accuracy, typos may remain.    John Schwartz PA-C  Division of Gastroenterology, Hepatology and Nutrition  [facility]      HPI 57-year-old female with past medical history of hypothyroidism, anxiety, presents for follow-up of acid reflux symptoms.    Patient was initially evaluated on 8/1/23, please see visit details below:    Patient reports reflux symptoms began Mid March 2023 - she describes symptoms as a pressure in her chest which radiates up her chest. She notes that symptoms were typically present on a daily basis, and symptoms were constant in nature. She notes that symptoms were generally worse after eating, particularly spicy and greasy foods. She tried taking tums for her symptoms (as she has had occasional heartburn in the past), but did not have much relief. She then started prilosec 20mg daily, she had slight improvement with this - after she had been on this for about 4-6 weeks she saw her PCP for further evaluation. PCP recommended continuation of prilosec 20mg, and patient has noticed improvement in her symptoms. She now does not feel pain constantly. She does take tums every night as she typically feels symptoms worsen at night. She has been sleeping on an incline pillow. She has limited to her diet quite a bit - no spicy, fatty, greasy foods, chocolate. She denies abdominal pain. Denies nausea or vomiting.    She has a long history of constipation, and bloating after she eats, which she has had for years. She generally has a BM every other day. She describes stool as a bristol type I, and reports pushing/straining to have a BM. Denies BRBPR. She does not  take any laxatives.       She takes aleve most day, denies  Tylenol. Denies use of OTC herbal supplements/weight loss products.  Previous history of cholecystectomy.    Denies use of ETOH and tobacco products. No recreational drug use.     Mother was diagnosed with pancreatic cancer at age 78. Denies family history of IBD/celiac disease.     Patient works as an  - off in the summer.     11/9/23:  Patient reports that he reflux is fairly well controlled - she has been taking omeprazole 40mg daily, in the morning. She does have some mild symptoms when she eats spicy foods, but she has been trying to avoid these in generally. Denies abdominal pain, nausea, or vomiting. She reports normal appetitie.     In regards to her bowel movements, she generally has a bowel movement usually 3 days a week (EOD), she describes the stool as a bristol type 3. She does endorse pushing/straining to have a bowel movement. Denies BRBPR. She is more so taking miralax to needed.     ROS:    No fevers or chills  No weight loss  No shortness of breath or wheezing  No chest pain or pressure  No odynophagia or dysphagia  No BRBPR, hematochezia, melena  No anxiety or depression    PROBLEM LIST  Patient Active Problem List    Diagnosis Date Noted    Chronic insomnia      Priority: Medium     on ambien and klonopin for over 10 years together      Adenomatous polyp of colon      Priority: Medium     fu 5 years, [place] gi      Anxiety      Priority: Medium    Depression, major, in partial remission (H24)      Priority: Medium    Fibromyalgia      Priority: Medium    Restless leg syndrome      Priority: Medium    Interstitial cystitis      Priority: Medium     urol [place] and had interstem      Migraines      Priority: Medium     Dr. Cash of neuro      Hypothyroid      Priority: Medium       PERTINENT PAST MEDICAL HISTORY:    Past Medical History:   Diagnosis Date    Adenomatous polyp of colon 05/2016    fu 5 years, [place] gi; fu done 2021 and nl     Anxiety     Chronic insomnia     on ambien and klonopin for over 10 years together    Depression, major, in partial remission (H)     was seeing psyche until 2015, not since then, as of 2022 seeing Dr. Bryant Flower    Dilated bile duct 2020    extrahepatic, seen on ct for ruq pain, then eus done and no stricture seen    Fibromyalgia 1998    Hypothyroid 90's    Interstitial cystitis     urol [place] and had interstem    Migraines     Dr. Cash of neuro, got botox 2012 via [facility] Dr. Kevin, now Dr. Watkins    Restless leg syndrome     Urgency of urination     interstim       PREVIOUS SURGERIES:    Past Surgical History:   Procedure Laterality Date    COLONOSCOPY      ENDOSCOPIC ULTRASOUND UPPER GASTROINTESTINAL TRACT (GI) N/A 7/20/2020    Procedure: DIAGNOSTIC ENDOSCOPIC ULTRASOUND, ESOPHAGOSCOPY / UPPER GASTROINTESTINAL TRACT;  Surgeon: Juan Hurst MD;  Location: [facility] OR    interstim procedure  2015, 2010 and 2008    [facility]    LAPAROSCOPIC CHOLECYSTECTOMY N/A 3/24/2021    Procedure: CHOLECYSTECTOMY, LAPAROSCOPIC;  Surgeon: William Leslie MD;  Location: [facility] OR       PREVIOUS ENDOSCOPY:  Colonoscopy (2021): see chart.    ALLERGIES:     Allergies   Allergen Reactions    Adhesive Tape Rash     Some tapes and surgical glue       PERTINENT MEDICATIONS:    Current Outpatient Medications:     BOTOX 200 units injection, , Disp: , Rfl:     buPROPion (WELLBUTRIN XL) 150 MG 24 hr tablet, every morning , Disp: , Rfl:     levothyroxine (SYNTHROID/LEVOTHROID) 100 MCG tablet, TAKE 1 TABLET(100 MCG) BY MOUTH DAILY, Disp: 90 tablet, Rfl: 0    loratadine (CLARITIN) 10 MG tablet, Take 10 mg by mouth daily as needed , Disp: 30 tablet, Rfl: 1    topiramate (TOPAMAX) 200 MG tablet, Take 200 mg by mouth At Bedtime , Disp: , Rfl:     vortioxetine (BRINTELLIX) 10 MG tablet, Take 1 tablet (10 mg) by mouth daily (Patient taking differently: Take 10 mg by mouth At Bedtime), Disp: 90 tablet, Rfl: 3    zolpidem (AMBIEN) 5  MG tablet, Take 1 tablet (5 mg) by mouth nightly as needed, Disp: 90 tablet, Rfl: 0    SOCIAL HISTORY:    Social History     Socioeconomic History    Marital status:      Spouse name: Not on file    Number of children: 1    Years of education: Not on file    Highest education level: Not on file   Occupational History    Occupation: homemaker     Employer: NONE     Occupation: teacher   Tobacco Use    Smoking status: Never    Smokeless tobacco: Never   Substance and Sexual Activity    Alcohol use: No     Alcohol/week: 0.0 standard drinks of alcohol    Drug use: No    Sexual activity: Yes     Partners: Male     Birth control/protection: Post-menopausal   Other Topics Concern    Parent/sibling w/ CABG, MI or angioplasty before 65F 55M? Not Asked   Social History Narrative    Not on file     Social Determinants of Health     Financial Resource Strain: Not on file   Food Insecurity: Not on file   Transportation Needs: Not on file   Physical Activity: Not on file   Stress: Not on file   Social Connections: Not on file   Interpersonal Safety: Not on file   Housing Stability: Not on file       FAMILY HISTORY:  FH of CRC: None.  FH of IBD: None.  Family History   Problem Relation Age of Onset    Pancreatic Cancer Mother     Depression Sister     Depression Father     Lymphoma Father     No Known Problems Brother     Osteoporosis Maternal Grandmother     Deep Vein Thrombosis No family hx of     Anesthesia Reaction No family hx of        Past/family/social history reviewed and no changes    PHYSICAL EXAMINATION:  Constitutional: aaox3, cooperative, pleasant, not dyspneic/diaphoretic, no acute distress  Vitals reviewed: /77 (BP Location: Left arm, Patient Position: Sitting, Cuff Size: Adult Regular)   Pulse 74   Wt 86.1 kg (189 lb 12.8 oz)   LMP 06/27/2012   SpO2 99%   BMI 28.86 kg/m    Wt:   Wt Readings from Last 2 Encounters:   08/01/23 83.2 kg (183 lb 8 oz)   05/01/23 82.2 kg (181 lb 4.8 oz)      Eyes:  Sclera anicteric/injected  Respiratory: Unlabored breathing  Skin: warm, perfused, no jaundice  Psych: Normal affect  MSK: Normal gait

## 2023-11-13 ENCOUNTER — TELEPHONE (OUTPATIENT)
Dept: FAMILY MEDICINE | Facility: CLINIC | Age: 58
End: 2023-11-13

## 2023-11-13 NOTE — TELEPHONE ENCOUNTER
Patient calling to report that pharmacy could not fill prescription with insurance coverage, as insurance will not cover more than 45 pills in a 65 day period. Patient states that she was told by [facility] that a PA was needed for additional medication over the 45 pills in a 65 day period.    Patient states that she is completely out of medication and requires the medication with a couple of days.    Routing to PA team to advise if a PA would be useful for this, or if patient should request a new order through PCP and the previous order (for 90 tablets) should be discontinued. Patient requests a call back if initiating a PA for the medication is not an option.    Callback: [phone] ok to leave a detailed vm    Eveline Camargo, RN  -[facility]

## 2023-11-16 ENCOUNTER — PATIENT OUTREACH (OUTPATIENT)
Dept: GASTROENTEROLOGY | Facility: CLINIC | Age: 58
End: 2023-11-16

## 2023-11-16 NOTE — TELEPHONE ENCOUNTER
Central Prior Authorization Team   Phone: [phone]    PA Initiation    Medication: zolpidem (AMBIEN) 5 MG tablet  Insurance Company: [employer] Non-Specialty PA's - Phone [phone] Fax [phone]  Pharmacy Filling the Rx: [id number] [place] [facility] [place] - [address] AT [facility] [address]  Filling Pharmacy Phone: [phone]  Filling Pharmacy Fax:    Start Date: 11/16/2023

## 2023-11-16 NOTE — TELEPHONE ENCOUNTER
In error, writer failed to include the medication. Patient requesting to know if a PA for zolpidem would be useful for additional insurance coverage.    Eveline Camargo RN  -[facility]

## 2023-11-22 NOTE — TELEPHONE ENCOUNTER
Prior Authorization Approval        Authorization Effective Date: 10/17/2023  Authorization Expiration Date: 11/15/2024  Medication: zolpidem (AMBIEN) 5 MG tablet-PA APPROVED   Approved Dose/Quantity:   Reference #:     Insurance Company: [employer] Non-Specialty PA's - Phone [phone] Fax [phone]  Expected CoPay:       CoPay Card Available:      Foundation Assistance Needed:    Which Pharmacy is filling the prescription (Not needed for infusion/clinic administered): [id number] - [place], [place] - [address] AT [facility] [address] [facility]  Pharmacy Notified:  Yes- **Instructed pharmacy to notify patient when script is ready to /ship.**-Pharmacy stated that patient paid cash/out of pocket for medication and has picked up medication. Pharmacy noted PA Approval on patients chart for next fill if any.   Patient Notified:  Yes

## 2023-12-07 ENCOUNTER — TELEPHONE (OUTPATIENT)
Dept: GASTROENTEROLOGY | Facility: CLINIC | Age: 58
End: 2023-12-07

## 2023-12-07 DIAGNOSIS — K21.9 GASTROESOPHAGEAL REFLUX DISEASE, UNSPECIFIED WHETHER ESOPHAGITIS PRESENT: Primary | ICD-10-CM

## 2023-12-07 NOTE — TELEPHONE ENCOUNTER
John Schwartz PA-C Heckt, Angie, RN  Caller: Unspecified (Today,  9:54 AM)  Yaakov Ray,    Yes, lets have her do an EGD with bravo OFF therapy. We can have her do it with MAC. She would need to be OFF PPI for TWO Weeks prior to the EGD, and she would need to be off pepcid/tums I believe for one week before the EGD, but should clarify with endoscopy nurses.    I can sign the order once its in, thanks!      Attempted to reach patient with the above info from provider, no answer, message left with call back info provided. Order entered for EGD with [facility].    Flor Cárdenas, TAYLER

## 2023-12-07 NOTE — TELEPHONE ENCOUNTER
[facility] Call Center    Phone Message    May a detailed message be left on voicemail: yes     Reason for Call: Other: Vero is calling in asking to leave a message for her care team. She states that she was instructed to try reducing her omeprazole usage, but has had to go back to what she was taking before due to it not working with a lower dose. Pt would like to discuss getting an order for an Endoscopy. Please respond accordingly.     Action Taken: Message routed to:  [facility]): GI    Travel Screening: Not Applicable

## 2023-12-07 NOTE — TELEPHONE ENCOUNTER
Routed to provider for review. Will call patient back once provider makes her recommendations .  Flor Cárdenas RN

## 2023-12-08 NOTE — TELEPHONE ENCOUNTER
Call to patient to update on provider recommendations in below note. Patient verbalized understanding. Writer also provided patient with number to call and schedule if she does not hear from someone in a couple days.    Flor Cárdenas RN

## 2023-12-11 ENCOUNTER — HOSPITAL ENCOUNTER (OUTPATIENT)
Facility: CLINIC | Age: 58
End: 2023-12-11
Attending: INTERNAL MEDICINE | Admitting: INTERNAL MEDICINE
Payer: COMMERCIAL

## 2023-12-11 ENCOUNTER — TELEPHONE (OUTPATIENT)
Dept: GASTROENTEROLOGY | Facility: CLINIC | Age: 58
End: 2023-12-11

## 2023-12-11 NOTE — TELEPHONE ENCOUNTER
"Endoscopy Scheduling Screen    Have you had a positive Covid test in the last 14 days?  No    Are you active on MyChart?   No    What insurance is in the chart?  Other:  [employer]  - pt did not have info on her at time of call    Ordering/Referring Provider:   ANNMARIE PROCTOR        (If ordering provider performs procedure, schedule with ordering provider unless otherwise instructed. )    BMI: Estimated body mass index is 28.86 kg/m  as calculated from the following:    Height as of 8/1/23: 1.727 m (5' 8\").    Weight as of 11/9/23: 86.1 kg (189 lb 12.8 oz).     Sedation Ordered  MAC/deep sedation.   BMI<= 45 45 < BMI <= 48 48 < BMI < = 50  BMI > 50   No Restrictions No [facility]  No [facility]  [facility] with exceptions Hospital Only OR Only       Are you taking any prescription medications for pain 3 or more times per week?   No    Do you have a history of malignant hyperthermia or adverse reaction to anesthesia?  No    (Females) Are you currently pregnant?   No     Have you been diagnosed or told you have pulmonary hypertension?   No    Do you have an LVAD?  No    Have you been told you have moderate to severe sleep apnea?  No    Have you been told you have COPD, asthma, or any other lung disease?  No    Do you have any heart conditions?  No     Have you ever had an organ transplant?   No    Have you ever had or are you awaiting a heart or lung transplant?   No    Have you had a stroke or transient ischemic attack (TIA aka \"mini stroke\" in the last 6 months?   No    Have you been diagnosed with or been told you have cirrhosis of the liver?   No    Are you currently on dialysis?   No    Do you need assistance transferring?   No    BMI: Estimated body mass index is 28.86 kg/m  as calculated from the following:    Height as of 8/1/23: 1.727 m (5' 8\").    Weight as of 11/9/23: 86.1 kg (189 lb 12.8 oz).     Is patients BMI > 40 and scheduling location [facility]?  No    Do you take an injectable medication for weight loss or diabetes " (excluding insulin)?  No    Do you take the medication Naltrexone?  No    Do you take blood thinners?  No       Prep   Are you currently on dialysis or do you have chronic kidney disease?  No    Do you have a diagnosis of diabetes?  No    Do you have a diagnosis of cystic fibrosis (CF)?  No    On a regular basis do you go 3 -5 days between bowel movements?  No    BMI > 40?  No    Preferred Pharmacy:    [id number]  [place] - [address]  [address] [facility] [address]  Phone: [phone] Fax: [phone]      Final Scheduling Details   Colonoscopy prep sent?  N/A    Procedure scheduled  Upper endoscopy with BRAVO capsule placement    Surgeon:  LUCERO ORNELAS     Date of procedure:  03/14/2024     Pre-OP / PAC:   No - Not required for this site.    Location  [facility] - Patient preference.    Sedation   MAC/Deep Sedation - Per order.      Patient Reminders:   You will receive a call from a Nurse to review instructions and health history.  This assessment must be completed prior to your procedure.  Failure to complete the Nurse assessment may result in the procedure being cancelled.      On the day of your procedure, please designate an adult(s) who can drive you home stay with you for the next 24 hours. The medicines used in the exam will make you sleepy. You will not be able to drive.      You cannot take public transportation, ride share services, or non-medical taxi service without a responsible caregiver.  Medical transport services are allowed with the requirement that a responsible caregiver will receive you at your destination.  We require that drivers and caregivers are confirmed prior to your procedure.

## 2024-01-08 DIAGNOSIS — E03.9 HYPOTHYROIDISM, UNSPECIFIED TYPE: ICD-10-CM

## 2024-01-08 RX ORDER — LEVOTHYROXINE SODIUM 100 UG/1
100 TABLET ORAL DAILY
Qty: 90 TABLET | Refills: 0 | Status: SHIPPED | OUTPATIENT
Start: 2024-01-08 | End: 2024-04-08

## 2024-03-02 ENCOUNTER — TELEPHONE (OUTPATIENT)
Dept: GASTROENTEROLOGY | Facility: CLINIC | Age: 59
End: 2024-03-02

## 2024-03-03 NOTE — TELEPHONE ENCOUNTER
Pre visit planning completed.      Procedure details:    Patient scheduled for Upper endoscopy (EGD) with BRAVO capsule placement on 3/14/24.     Arrival time: 1130. Procedure time 1300    Pre op exam needed? N/A    Facility location: [facility]; [address] [place] [address], [address] Floor, [place] [address]    Sedation type: MAC    Indication for procedure:   Gastroesophageal reflux disease, unspecified whether esophagitis present      Chart review:     Electronic implanted devices? No    Recent diagnosis of diverticulitis within the last 6 weeks? No    Diabetic? No      Medication review:    Anticoagulants? No    NSAIDS? No    Other medication HOLDING recommendations:  BRAVO: PPIs/Acid reducing medication: HOLD 2 weeks before procedure per ordering provider.       Prep for procedure:     Bowel prep recommendation: N/A     Prep instructions sent via Circle       Mendy Ulloa RN  Endoscopy Procedure Pre Assessment RN  [phone] option 4

## 2024-03-04 NOTE — TELEPHONE ENCOUNTER
Pre assessment completed for upcoming procedure.   (Please see previous telephone encounter notes for complete details)    Patient  returned call.       Procedure details:    Arrival time and facility location reviewed.    Pre op exam needed? N/A    Designated  policy reviewed. Instructed to have someone stay 24  hours post procedure.       Medication review:    Medications reviewed. Please see supporting documentation below. Holding recommendations discussed (if applicable).       Prep for procedure:     Procedure prep instructions reviewed.        Any additional information needed:  Patient has interstim device implanted.       Patient  verbalized understanding and had no questions or concerns at this time.      Mendy Capone RN  Endoscopy Procedure Pre Assessment RN  [phone] option 4

## 2024-03-04 NOTE — TELEPHONE ENCOUNTER
Attempted to contact patient in order to complete pre assessment questions.     No answer. Left message to return call to [phone] option 4    Missed call communication sent via Scoopinion.    Mendy Ulloa RN  Endoscopy Procedure Pre Assessment RN  [phone] option 4

## 2024-03-08 ENCOUNTER — TELEPHONE (OUTPATIENT)
Dept: GASTROENTEROLOGY | Facility: CLINIC | Age: 59
End: 2024-03-08

## 2024-03-08 NOTE — TELEPHONE ENCOUNTER
Caller: Jose/   Reason for Reschedule/Cancellation (please be detailed, any staff messages or encounters to note?):     Per  - only cancelling at this time       Prior to reschedule please review:  Ordering Provider:    John Schwartz PA-C     Sedation Determined: mac   Does patient have any ASC Exclusions, please identify?: n       Notes on Cancelled Procedure:  Procedure:Upper Endoscopy with BRAVO [EGD BRAVO]   Date: 03/14/2024  Location:[facility]; [address] [place] [address], 3[address] Floor, [place] [address]  Surgeon: paramjit Celis         Rescheduled: no       Does patient need PAC or Pre -Op Rescheduled? : n        Send In - basket message to Panc - Jalen Pool if EUS procedure is canceled or rescheduled: [ N/A, YES or NO] n/a

## 2024-04-07 DIAGNOSIS — E03.9 HYPOTHYROIDISM, UNSPECIFIED TYPE: ICD-10-CM

## 2024-04-08 RX ORDER — LEVOTHYROXINE SODIUM 100 UG/1
100 TABLET ORAL DAILY
Qty: 90 TABLET | Refills: 0 | Status: SHIPPED | OUTPATIENT
Start: 2024-04-08 | End: 2024-08-02

## 2024-04-25 ENCOUNTER — HOSPITAL ENCOUNTER (OUTPATIENT)
Facility: AMBULATORY SURGERY CENTER | Age: 59
End: 2024-04-25
Attending: INTERNAL MEDICINE
Payer: COMMERCIAL

## 2024-04-25 ENCOUNTER — TELEPHONE (OUTPATIENT)
Dept: GASTROENTEROLOGY | Facility: CLINIC | Age: 59
End: 2024-04-25

## 2024-04-25 NOTE — TELEPHONE ENCOUNTER
"Endoscopy Scheduling Screen    Have you had a positive Covid test in the last 14 days?  No    What is your communication preference for Instructions and/or Bowel Prep?   MyChart    What insurance is in the chart?  Other:  BCBS    Ordering/Referring Provider: John Schwartz PA-C     (If ordering provider performs procedure, schedule with ordering provider unless otherwise instructed. )    BMI: Estimated body mass index is 28.86 kg/m  as calculated from the following:    Height as of 8/1/23: 1.727 m (5' 8\").    Weight as of 11/9/23: 86.1 kg (189 lb 12.8 oz).     Sedation Ordered  MAC/deep sedation.   BMI<= 45 45 < BMI <= 48 48 < BMI < = 50  BMI > 50   No Restrictions No [facility]  No [facility]  [facility] with exceptions Hospital Only OR Only       Do you have a history of malignant hyperthermia?  No    (Females) Are you currently pregnant?   No     Have you been diagnosed or told you have pulmonary hypertension?   No    Do you have an LVAD?  No    Have you been told you have moderate to severe sleep apnea?  No    Have you been told you have COPD, asthma, or any other lung disease?  No    Do you have any heart conditions?  No     Have you ever had or are you waiting for an organ transplant?  No. Continue scheduling, no site restrictions.    Have you had a stroke or transient ischemic attack (TIA aka \"mini stroke\" in the last 6 months?   No    Have you been diagnosed with or been told you have cirrhosis of the liver?   No    Are you currently on dialysis?   No    Do you need assistance transferring?   No    BMI: Estimated body mass index is 28.86 kg/m  as calculated from the following:    Height as of 8/1/23: 1.727 m (5' 8\").    Weight as of 11/9/23: 86.1 kg (189 lb 12.8 oz).     Is patients BMI > 40 and scheduling location [facility]?  No    Do you take an injectable medication for weight loss or diabetes (excluding insulin)?  No    Do you take the medication Naltrexone?  No    Do you take blood thinners?  No       Prep   Are you " currently on dialysis or do you have chronic kidney disease?  No    Do you have a diagnosis of diabetes?  No    Do you have a diagnosis of cystic fibrosis (CF)?  No    On a regular basis do you go 3 -5 days between bowel movements?  Yes (Extended Prep)    BMI > 40?  No    Preferred Pharmacy:      [id number] - [place], [place] - [address] AT [facility] OF  & [address]  [address] [facility]  [place] [address]  Phone: [phone] Fax: [phone]      Final Scheduling Details     Procedure scheduled  Upper endoscopy with BRAVO capsule placement    Surgeon:  NIYAH     Date of procedure:  7/31     Pre-OP / PAC:   No - Not required for this site.    Location  [facility] - ASC - Per order.    Sedation   MAC/Deep Sedation - Per order.      Patient Reminders:   You will receive a call from a Nurse to review instructions and health history.  This assessment must be completed prior to your procedure.  Failure to complete the Nurse assessment may result in the procedure being cancelled.      On the day of your procedure, please designate an adult(s) who can drive you home stay with you for the next 24 hours. The medicines used in the exam will make you sleepy. You will not be able to drive.      You cannot take public transportation, ride share services, or non-medical taxi service without a responsible caregiver.  Medical transport services are allowed with the requirement that a responsible caregiver will receive you at your destination.  We require that drivers and caregivers are confirmed prior to your procedure.

## 2024-05-08 NOTE — PROGRESS NOTES
Vero is a 58 year old  female who presents for annual exam.     HPI:  The patient's PCP is Emile Jara MD.      Vero presents today for annual exam.    She feels mental health is well supported with her current team and medications. She has had some life stressors with her dad's recent hospitalization for pneumonia, but feels her mental health has been stable despite these stressors.     She is postmenopausal, no reports of V.M. symptoms. Sexually active with one partner, not interested in STI testing today.     Patient will see GI for EGD in July, experiencing reflux over the past year. No other new health conditions or health team members.     Has had some issues with constipation, recently started daily Miralax which has been helping.     She has no other questions or concerns today.       GYNECOLOGIC HISTORY:    Patient's last menstrual period was 2012.    Her current contraception method is: Post menopausal.  She  reports that she has never smoked. She has never used smokeless tobacco.    Patient is sexually active.  STD testing offered?  Declined  Last PHQ-9 score on record =       2024     2:40 PM   PHQ-9 SCORE   PHQ-9 Total Score 0     Last GAD7 score on record =       2022     2:29 PM   BASILIO-7 SCORE   Total Score 0     Alcohol Score = 0    HEALTH MAINTENANCE:  Cholesterol: (  Cholesterol   Date Value Ref Range Status   2022 161 <200 mg/dL Final   2019 179 <200 mg/dL Final   2018 183 <200 mg/dL Final      Last Mammo:  2022 , Result: Normal, Next Mammo: Today   Pap: (  Lab Results   Component Value Date    GYNINTERP (NILM) -Neg HPV 2022    PAP NIL -Neg HPV 2018     Colonoscopy:  2021, Result: Normal, Next Colonoscopy: 10 years.  Dexa:  Due at age 65    Health maintenance updated:  yes    Overdue          Never done ADVANCE CARE PLANNING (Every 5 Years)     2011 HEPATITIS B IMMUNIZATION (3 of 3 - Hep B Twinrix 3-dose series)  Last  completed: 2011     Never done ZOSTER IMMUNIZATION (1 of 2)     MAY 11  2021 DTAP/TDAP/TD IMMUNIZATION (2 - Td or Tdap)  Last completed: May 11, 2011     MAY 23  2023 YEARLY PREVENTIVE VISIT (Yearly)  Last completed: May 23, 2022     MAY 23  2023 MAMMO SCREENING (Yearly)   Scheduled for: May 14, 2024     SEP 1  2023 COVID-19 Vaccine (3 - 2023-24 season)  Last completed: May 15, 2021     NOV 1  2023 PHQ-9 (Every 6 Months)  Last completed: May 1, 2023     MAY 1  2024 TSH W/FREE T4 REFLEX (Yearly)  Last completed: May 1, 2023       HISTORY:  OB History    Para Term  AB Living   1 1 1 0 0 1   SAB IAB Ectopic Multiple Live Births   0 0 0 0 0      # Outcome Date GA Lbr Demetris/2nd Weight Sex Type Anes PTL Lv   1 Term                Patient Active Problem List   Diagnosis    Fibromyalgia    Restless leg syndrome    Interstitial cystitis    Migraines    Hypothyroid    Depression, major, in partial remission (H24)    Anxiety    Adenomatous polyp of colon    Chronic insomnia     Past Surgical History:   Procedure Laterality Date    COLONOSCOPY      ENDOSCOPIC ULTRASOUND UPPER GASTROINTESTINAL TRACT (GI) N/A 2020    Procedure: DIAGNOSTIC ENDOSCOPIC ULTRASOUND, ESOPHAGOSCOPY / UPPER GASTROINTESTINAL TRACT;  Surgeon: Juan Hurst MD;  Location: [facility] OR    interstim procedure  ,  and     [facility]    LAPAROSCOPIC CHOLECYSTECTOMY N/A 3/24/2021    Procedure: CHOLECYSTECTOMY, LAPAROSCOPIC;  Surgeon: William Leslie MD;  Location: [facility] OR      Social History     Tobacco Use    Smoking status: Never    Smokeless tobacco: Never   Substance Use Topics    Alcohol use: No     Alcohol/week: 0.0 standard drinks of alcohol      Problem (# of Occurrences) Relation (Name,Age of Onset)    Depression (2) Sister, Father    Osteoporosis (1) Maternal Grandmother    Lymphoma (1) Father    Pancreatic Cancer (1) Mother    No Known Problems (1) Brother           Negative family history of: Deep Vein  "Thrombosis, Anesthesia Reaction              Current Outpatient Medications   Medication Sig Dispense Refill    BOTOX 200 units injection       buPROPion (WELLBUTRIN XL) 150 MG 24 hr tablet every morning       levothyroxine (SYNTHROID/LEVOTHROID) 100 MCG tablet TAKE 1 TABLET(100 MCG) BY MOUTH DAILY 90 tablet 0    loratadine (CLARITIN) 10 MG tablet Take 10 mg by mouth daily as needed  30 tablet 1    topiramate (TOPAMAX) 200 MG tablet Take 200 mg by mouth At Bedtime       vortioxetine (BRINTELLIX) 10 MG tablet Take 1 tablet (10 mg) by mouth daily (Patient taking differently: Take 10 mg by mouth at bedtime) 90 tablet 3    zolpidem (AMBIEN) 5 MG tablet Take 1 tablet (5 mg) by mouth nightly as needed (Patient not taking: Reported on 5/14/2024) 90 tablet 0     No current facility-administered medications for this visit.     Allergies   Allergen Reactions    Adhesive Tape Rash     Some tapes and surgical glue       Past medical, surgical, social and family histories were reviewed and updated in EPIC.    EXAM:  /86   Ht 1.702 m (5' 7\")   Wt 81.6 kg (180 lb)   LMP 06/27/2012   BMI 28.19 kg/m     BMI: Body mass index is 28.19 kg/m .    PHYSICAL EXAM:  Constitutional:   Appearance: Well nourished, well developed, alert, in no acute distress  Neck:  Lymph Nodes:  No lymphadenopathy present    Thyroid:  Gland size normal, nontender, no nodules or masses present  on palpation  Chest:  Respiratory Effort:  Breathing unlabored  Cardiovascular:    Heart: Auscultation:  Regular rate, normal rhythm, no murmurs present  Breasts: Inspection of Breasts:  No lymphadenopathy present., Palpation of Breasts and Axillae:  No masses present on palpation, no breast tenderness., Axillary Lymph Nodes:  No lymphadenopathy present., and No nodularity, asymmetry or nipple discharge bilaterally.  Gastrointestinal:   Abdominal Examination:  Abdomen nontender to palpation, tone normal without rigidity or guarding, no masses present, umbilicus " without lesions   Liver and Spleen:  No hepatomegaly present, liver nontender to palpation    Hernias:  No hernias present  Lymphatic: Lymph Nodes:  No other lymphadenopathy present  Skin:  General Inspection:  No rashes present, no lesions present, no areas of  discoloration  Neurologic:    Mental Status:  Oriented X3.  Normal strength and tone, sensory exam                grossly normal, mentation intact and speech normal.    Psychiatric:   Mentation appears normal and affect normal.       COUNSELING:   Reviewed preventive health counseling, as reflected in patient instructions       Regular exercise       Healthy diet/nutrition       Advance Care Planning    BMI: Body mass index is 28.19 kg/m .  Weight management plan: Discussed healthy diet and exercise guidelines    ASSESSMENT:  58 year old female with satisfactory annual exam.    ICD-10-CM    1. Encounter for gynecological examination without abnormal finding  Z01.419       2. Hypothyroidism, unspecified type  E03.9       3. Screening for thyroid disorder  Z13.29 TSH with free T4 reflex          PLAN:  Patient presents today for annual exam.    Health Maintenance  - PAP UTD due 05/2025  - Colonoscopy UTD due 8/2031  - mammogram scheduled for today   - TDAP updated today  - counseled patient on eligibility of Zoster vaccine  - counseled on advance care planning, information about Honoring Choices given  - counseled on self breast exams     Hypothyroid  - TSH ordered  - on Synthroid 100mcg    Constipation  - straining with bowel movements  - started daily Miralax  - discussed trial of fiber supplement if bowel movements still difficult      Marleen Cochran PA-C

## 2024-05-13 NOTE — PATIENT INSTRUCTIONS
Thank you for visiting the [facility].    We care about your health! Here are some general tips to help you stay as healthy as possible:    - What we eat and drink provides the nutrients we need to keep our bodies working well. Try to eat a variety of foods including lots of vegetables, fruits, whole grains, and proteins. Try to limit foods and beverages with high sodium, trans fats, added sugars, and alcohol.    - Physical activity is beneficial for both your physical and mental health. Aim for at least 2.5 hours of medium-intensity (walking, biking, yoga, housework, etc) or 1.25 hours of high-intensity (running, jump rope, rowing, etc) aerobic physical activity per week in addition to engaging in strengthening activities at least 2 times per week. Start small and work towards a goal. Any activity is good activity!    - If you are not preventing pregnancy, take a folic acid supplement of 0.4 mg/day.    - Calcium helps build and maintain strong bones, muscles, and nerves. Daily total calcium intake (food + supplements) should be 1000mg (equal to 3-4 servings of calcium rich foods such as milk, cheese, yogurt, seafood, leafy green vegetables, edamame, orange juice with added calcium, and some cereals, among others)    - STI testing is always available to you. Please contact the office whenever you would like to update your testing or have a new or potential exposure.    - Follow sexually transmitted infection (STI) prevention tips: talk to all partners about STI testing, use condoms or other barrier methods to protect yourself and others, and get tested for STIs with each new partner.    - Return for a preventative visit every year    ADVANCE DIRECTIVE  We recommend that everyone make an advance directive about their health care goals and update it every 5 years.    When it comes to decisions about your health, it s important that your health care goals, values and wishes are known. In the  event of a sudden injury or illness, you may not be able to communicate your choices. We encourage you to begin by thinking about what matters most to you. Have conversations with family, friends, cultural and/or nohemi leaders, and your health care team. Then, share your goals and wishes for current and future healthcare so your voice is heard when treatment decisions need to be made. See the link below for help with talking about and sharing what is most important to you:     [email or web address]    Please do not hesitate to contact the office if you have any questions or concerns.

## 2024-05-14 ENCOUNTER — ANCILLARY PROCEDURE (OUTPATIENT)
Dept: MAMMOGRAPHY | Facility: CLINIC | Age: 59
End: 2024-05-14
Payer: COMMERCIAL

## 2024-05-14 ENCOUNTER — OFFICE VISIT (OUTPATIENT)
Dept: OBGYN | Facility: CLINIC | Age: 59
End: 2024-05-14
Payer: COMMERCIAL

## 2024-05-14 VITALS
DIASTOLIC BLOOD PRESSURE: 86 MMHG | BODY MASS INDEX: 28.25 KG/M2 | SYSTOLIC BLOOD PRESSURE: 110 MMHG | WEIGHT: 180 LBS | HEIGHT: 67 IN

## 2024-05-14 DIAGNOSIS — Z13.29 SCREENING FOR THYROID DISORDER: ICD-10-CM

## 2024-05-14 DIAGNOSIS — E03.9 HYPOTHYROIDISM, UNSPECIFIED TYPE: ICD-10-CM

## 2024-05-14 DIAGNOSIS — Z01.419 ENCOUNTER FOR GYNECOLOGICAL EXAMINATION WITHOUT ABNORMAL FINDING: Primary | ICD-10-CM

## 2024-05-14 DIAGNOSIS — Z12.31 VISIT FOR SCREENING MAMMOGRAM: ICD-10-CM

## 2024-05-14 PROCEDURE — 77063 BREAST TOMOSYNTHESIS BI: CPT | Mod: TC | Performed by: RADIOLOGY

## 2024-05-14 PROCEDURE — 77067 SCR MAMMO BI INCL CAD: CPT | Mod: TC | Performed by: RADIOLOGY

## 2024-05-14 PROCEDURE — 99212 OFFICE O/P EST SF 10 MIN: CPT

## 2024-05-14 PROCEDURE — 36415 COLL VENOUS BLD VENIPUNCTURE: CPT

## 2024-05-14 PROCEDURE — 84443 ASSAY THYROID STIM HORMONE: CPT

## 2024-05-14 ASSESSMENT — PATIENT HEALTH QUESTIONNAIRE - PHQ9: SUM OF ALL RESPONSES TO PHQ QUESTIONS 1-9: 0

## 2024-05-15 LAB — TSH SERPL DL<=0.005 MIU/L-ACNC: 2.93 UIU/ML (ref 0.3–4.2)

## 2024-06-24 NOTE — TELEPHONE ENCOUNTER
I called pt, she said her OB-GYN tested  thyroid in May and is the one following her now.  Reminded pt to have pharmacy route refills to that provider.   Li GILMORE MA

## 2024-07-09 ENCOUNTER — TRANSFERRED RECORDS (OUTPATIENT)
Dept: HEALTH INFORMATION MANAGEMENT | Facility: CLINIC | Age: 59
End: 2024-07-09
Payer: COMMERCIAL

## 2024-07-22 ENCOUNTER — VIRTUAL VISIT (OUTPATIENT)
Dept: FAMILY MEDICINE | Facility: CLINIC | Age: 59
End: 2024-07-22
Payer: COMMERCIAL

## 2024-07-22 DIAGNOSIS — E03.9 HYPOTHYROIDISM, UNSPECIFIED TYPE: ICD-10-CM

## 2024-07-22 DIAGNOSIS — K52.9 CHRONIC DIARRHEA: Primary | ICD-10-CM

## 2024-07-22 PROCEDURE — 99214 OFFICE O/P EST MOD 30 MIN: CPT | Mod: 95 | Performed by: INTERNAL MEDICINE

## 2024-07-22 NOTE — PROGRESS NOTES
Vero is a 58 year old who is being evaluated via a billable video visit.    How would you like to obtain your AVS? MyChart  If the video visit is dropped, the invitation should be resent by: Text to cell phone: [phone]  Will anyone else be joining your video visit? No      Assessment & Plan   Problem List Items Addressed This Visit       Hypothyroid    Relevant Orders    TSH with free T4 reflex     Other Visit Diagnoses       Chronic diarrhea    -  Primary    Relevant Orders    Fecal Lactoferrin    Enteric Bacteria and Virus Panel by [facility]    Ova and Parasites ([facility])    CRP, inflammation    Comprehensive metabolic panel (BMP + Alb, Alk Phos, ALT, AST, Total. Bili, TP)    CBC with Platelets & Differential           Advise further stool studies.  Repeat labs inflammatory markers.  Check thyroid function test.  Keep well-hydrated.  Try Imodium as needed diarrhea.  Possible IBS with diarrhea.  Follow-up if any worsening symptoms including abdominal pain, blood in the stools, fever, vomiting lethargy or other systemic symptoms.       See Patient Instructions      Subjective   Vero is a 58 year old, presenting for the following health issues:  Diarrhea         No data to display              HPI     Patient presenting for evaluation of diarrhea 3 days weeks to discharge.  Describes 1 watery bowel movement in the morning.  Has some gassiness and bloating symptoms.  No nausea or vomiting.  No blood in the stools.  No melena or hematochezia.  No abdominal pain.  No abdominal cramps.  No fever or chills.  No muscle aches or joint pains.  No rashes.  No known sick contacts.  No history of travel.  No other systemic complaints no fevers or chills.  No urinary symptoms.  No gross hematuria.  Patient had evaluation at [facility] with CT of abdomen that was unremarkable.  C. difficile was negative.  She did not complete other stool tests.Urine microscopy was negative chemistry showed normal kidney function test,  normal liver enzymes, normal lipase and normal CBC.    Review of Systems  Constitutional, HEENT, cardiovascular, pulmonary, gi and gu systems are negative, except as otherwise noted.      Objective           Vitals:  No vitals were obtained today due to virtual visit.    Physical Exam   GENERAL: alert and no distress  EYES: Eyes grossly normal to inspection.  No discharge or erythema, or obvious scleral/conjunctival abnormalities.  RESP: No audible wheeze, cough, or visible cyanosis.    SKIN: Visible skin clear. No significant rash, abnormal pigmentation or lesions.  NEURO: Cranial nerves grossly intact.  Mentation and speech appropriate for age.  PSYCH: Appropriate affect, tone, and pace of words    Office Visit on 05/14/2024   Component Date Value Ref Range Status    TSH 05/14/2024 2.93  0.30 - 4.20 uIU/mL Final         Video-Visit Details    Type of service:  Video Visit   Originating Location (pt. Location): Home    Distant Location (provider location):  On-site  Platform used for Video Visit: Juan Antonio  Signed Electronically by: Melissa Cardoso MD

## 2024-07-23 ENCOUNTER — LAB (OUTPATIENT)
Dept: LAB | Facility: CLINIC | Age: 59
End: 2024-07-23
Payer: COMMERCIAL

## 2024-07-23 ENCOUNTER — PATIENT OUTREACH (OUTPATIENT)
Dept: GASTROENTEROLOGY | Facility: CLINIC | Age: 59
End: 2024-07-23

## 2024-07-23 DIAGNOSIS — K52.9 CHRONIC DIARRHEA: ICD-10-CM

## 2024-07-23 LAB
ALBUMIN SERPL BCG-MCNC: 4.4 G/DL (ref 3.5–5.2)
ALP SERPL-CCNC: 89 U/L (ref 40–150)
ALT SERPL W P-5'-P-CCNC: 19 U/L (ref 0–50)
ANION GAP SERPL CALCULATED.3IONS-SCNC: 9 MMOL/L (ref 7–15)
AST SERPL W P-5'-P-CCNC: 25 U/L (ref 0–45)
BASOPHILS # BLD AUTO: 0 10E3/UL (ref 0–0.2)
BASOPHILS NFR BLD AUTO: 0 %
BILIRUB SERPL-MCNC: 0.6 MG/DL
BUN SERPL-MCNC: 8.9 MG/DL (ref 6–20)
CALCIUM SERPL-MCNC: 9.3 MG/DL (ref 8.8–10.4)
CHLORIDE SERPL-SCNC: 105 MMOL/L (ref 98–107)
CREAT SERPL-MCNC: 0.97 MG/DL (ref 0.51–0.95)
CRP SERPL-MCNC: <3 MG/L
EGFRCR SERPLBLD CKD-EPI 2021: 67 ML/MIN/1.73M2
EOSINOPHIL # BLD AUTO: 0.1 10E3/UL (ref 0–0.7)
EOSINOPHIL NFR BLD AUTO: 1 %
ERYTHROCYTE [DISTWIDTH] IN BLOOD BY AUTOMATED COUNT: 12.8 % (ref 10–15)
GLUCOSE SERPL-MCNC: 94 MG/DL (ref 70–99)
HCO3 SERPL-SCNC: 26 MMOL/L (ref 22–29)
HCT VFR BLD AUTO: 44.2 % (ref 35–47)
HGB BLD-MCNC: 13.6 G/DL (ref 11.7–15.7)
IMM GRANULOCYTES # BLD: 0 10E3/UL
IMM GRANULOCYTES NFR BLD: 0 %
LYMPHOCYTES # BLD AUTO: 1.1 10E3/UL (ref 0.8–5.3)
LYMPHOCYTES NFR BLD AUTO: 22 %
MCH RBC QN AUTO: 29.6 PG (ref 26.5–33)
MCHC RBC AUTO-ENTMCNC: 30.8 G/DL (ref 31.5–36.5)
MCV RBC AUTO: 96 FL (ref 78–100)
MONOCYTES # BLD AUTO: 0.4 10E3/UL (ref 0–1.3)
MONOCYTES NFR BLD AUTO: 8 %
NEUTROPHILS # BLD AUTO: 3.4 10E3/UL (ref 1.6–8.3)
NEUTROPHILS NFR BLD AUTO: 68 %
PLATELET # BLD AUTO: 187 10E3/UL (ref 150–450)
POTASSIUM SERPL-SCNC: 4.1 MMOL/L (ref 3.4–5.3)
PROT SERPL-MCNC: 7 G/DL (ref 6.4–8.3)
RBC # BLD AUTO: 4.6 10E6/UL (ref 3.8–5.2)
SODIUM SERPL-SCNC: 140 MMOL/L (ref 135–145)
TSH SERPL DL<=0.005 MIU/L-ACNC: 0.86 UIU/ML (ref 0.3–4.2)
WBC # BLD AUTO: 4.9 10E3/UL (ref 4–11)

## 2024-07-23 PROCEDURE — 80053 COMPREHEN METABOLIC PANEL: CPT

## 2024-07-23 PROCEDURE — 84443 ASSAY THYROID STIM HORMONE: CPT | Performed by: INTERNAL MEDICINE

## 2024-07-23 PROCEDURE — 86140 C-REACTIVE PROTEIN: CPT

## 2024-07-23 PROCEDURE — 85025 COMPLETE CBC W/AUTO DIFF WBC: CPT | Performed by: INTERNAL MEDICINE

## 2024-07-23 PROCEDURE — 36415 COLL VENOUS BLD VENIPUNCTURE: CPT

## 2024-07-23 NOTE — TELEPHONE ENCOUNTER
Pre assessment completed for upcoming procedure.      Procedure details:    Patient scheduled for Upper endoscopy (EGD) with BRAVO capsule placement on 7/31/24.     Arrival time: 0815. Procedure time 0915    Facility location: [facility] [place]; [address] [place] [address], [address] Floor, [place] [address]. Check in location: 5th Floor.    Sedation type: MAC    Pre op exam needed? No.    Indication for procedure: Gastroesophageal reflux disease, unspecified whether esophagitis present     Designated  policy reviewed. Instructed to have someone stay 24 hours post procedure.       Chart review:     Electronic implanted devices? No    Recent diagnosis of diverticulitis within the last 6 weeks?  N/A    Diabetic? No      Medication review:    Anticoagulants? No    NSAIDS? No    Other medication HOLDING recommendations:  BRAVO: PPIs/Acid reducing medication(s): HOLD 2 weeks before procedure.      Prep for procedure:     Bowel prep recommendation: N/A    Prep instructions sent via [employer]     Reviewed procedure prep instructions.     Patient verbalized understanding and had no questions or concerns at this time.        Danita Kilgore RN  Endoscopy Procedure Pre Assessment   [phone] option 4

## 2024-07-23 NOTE — TELEPHONE ENCOUNTER
Spoke with pt to go over information and instructions for 96 hr reflux study.  Pt verbalized understanding and will review instructions on mychart.  Monitor return visit scheduled.  Pt's main symptom is heartburn.

## 2024-07-25 ENCOUNTER — APPOINTMENT (OUTPATIENT)
Dept: LAB | Facility: CLINIC | Age: 59
End: 2024-07-25
Payer: COMMERCIAL

## 2024-07-25 LAB

## 2024-07-25 PROCEDURE — 87507 IADNA-DNA/RNA PROBE TQ 12-25: CPT

## 2024-07-25 PROCEDURE — 83630 LACTOFERRIN FECAL (QUAL): CPT

## 2024-07-26 NOTE — TELEPHONE ENCOUNTER
Incoming call from patient.     Patient states that since holding omeprazole their symptoms have improved and would like to know if they should keep procedure scheduled. Advised patient to consult with ordering provider to discuss necessity and current status. Patient agreeable to plan.       Mendy Ulloa RN  Endoscopy Procedure Pre Assessment   [phone] option 4

## 2024-07-29 ENCOUNTER — TELEPHONE (OUTPATIENT)
Dept: GASTROENTEROLOGY | Facility: CLINIC | Age: 59
End: 2024-07-29
Payer: COMMERCIAL

## 2024-07-29 NOTE — TELEPHONE ENCOUNTER
Caller: Vero    Reason for Reschedule/Cancellation   (please be detailed, any staff messages or encounters to note?): does not want at this time      Prior to reschedule please review:  Ordering Provider: John Schwartz   Sedation Determined: MAC  Does patient have any ASC Exclusions, please identify?: no      Notes on Cancelled Procedure:  Procedure: Upper Endoscopy with BRAVO [EGD BRAVO]   Date: 7/31  Location: [facility] [place]; [address] [place] [address], [address] Floor, [place] [address]  Surgeon: Yannick      Rescheduled: No, did not want to reschedule       Did you cancel or rescheduled an EUS procedure? No.

## 2024-08-01 DIAGNOSIS — E03.9 HYPOTHYROIDISM, UNSPECIFIED TYPE: ICD-10-CM

## 2024-08-01 NOTE — TELEPHONE ENCOUNTER
Called pt to clarify.  Rx last dispensed under a different provider with labs  LMTCB  Richy Ayers RN on 8/1/2024 at 12:27 PM   [facility]

## 2024-08-02 ENCOUNTER — TELEPHONE (OUTPATIENT)
Dept: OBGYN | Facility: CLINIC | Age: 59
End: 2024-08-02
Payer: COMMERCIAL

## 2024-08-02 DIAGNOSIS — E03.9 HYPOTHYROIDISM, UNSPECIFIED TYPE: ICD-10-CM

## 2024-08-02 RX ORDER — LEVOTHYROXINE SODIUM 100 UG/1
100 TABLET ORAL DAILY
Qty: 90 TABLET | Refills: 0 | Status: SHIPPED | OUTPATIENT
Start: 2024-08-02

## 2024-08-02 NOTE — TELEPHONE ENCOUNTER
Medication Question or Refill    Contacts       Contact Date/Time Type Contact Phone/Fax    08/02/2024 10:14 AM CDT Phone (Incoming) Vero Lua (Self) [phone] (M)            What medication are you calling about (include dose and sig)?: levothyroxine 100 mg, takes one a day     Preferred Pharmacy:       [id number] - [place], [place] - [address] AT [facility] OF [facility] [address] [facility]  [address] [facility] [address]  Phone: [phone] Fax: [phone]      Controlled Substance Agreement on file:   CSA -- Patient Level:    CSA: None found at the patient level.       Who prescribed the medication?: Dr. Jara     Do you need a refill? Yes    When did you use the medication last? A couple of days ago     Patient offered an appointment? Yes: Not at this time - recently had a video visit that has lab work done     Do you have any questions or concerns?  Yes: Patient is currently out of medication       Could we send this information to you in [employer] or would you prefer to receive a phone call?:   Patient would like to be contacted via [employer]

## 2024-08-02 NOTE — TELEPHONE ENCOUNTER
Spoke with Vero, thyroid has been last checked by Dr. Cardoso-has never been monitored by [facility]. Vero will call the provider that has been monitoring this currently and request a refill. Per pt no need to request any refills from [facility].  Margaret Escobedo RN on 8/2/2024 at 10:12 AM

## 2024-08-02 NOTE — TELEPHONE ENCOUNTER
[facility] Call Center    Phone Message    May a detailed message be left on voicemail: yes     Reason for Call: Medication Question or concern regarding medication   Prescription Clarification  Name of Medication: levothyroxine (SYNTHROID/LEVOTHROID) 100 MCG tablet   Prescribing Provider: Emile Jara MD   Pharmacy:    [facility] #[id number] [facility] [address] AT [facility] OF [facility] [address]   What on the order needs clarification? Pt is requesting that her OBGYN provider, Marleen Cochran PA-C takes over as prescribing provider on this Rx      Action Taken: Other: OBGYN    Travel Screening: Not Applicable     Date of Service:

## 2024-08-06 RX ORDER — LEVOTHYROXINE SODIUM 100 UG/1
100 TABLET ORAL DAILY
Qty: 90 TABLET | Refills: 0 | OUTPATIENT
Start: 2024-08-06

## 2024-08-06 NOTE — TELEPHONE ENCOUNTER
Refused as duplicate - ordered under another provider 8/2/24  Darshana Boo RN on 8/6/2024 at 4:11 PM

## 2024-08-19 ENCOUNTER — OFFICE VISIT (OUTPATIENT)
Dept: OBGYN | Facility: CLINIC | Age: 59
End: 2024-08-19
Payer: COMMERCIAL

## 2024-08-19 VITALS
DIASTOLIC BLOOD PRESSURE: 70 MMHG | HEIGHT: 67 IN | BODY MASS INDEX: 27.15 KG/M2 | WEIGHT: 173 LBS | SYSTOLIC BLOOD PRESSURE: 126 MMHG

## 2024-08-19 DIAGNOSIS — N89.8 VAGINAL DISCHARGE: Primary | ICD-10-CM

## 2024-08-19 DIAGNOSIS — N89.8 VAGINAL ODOR: ICD-10-CM

## 2024-08-19 LAB
BACTERIAL VAGINOSIS VAG-IMP: NEGATIVE
CANDIDA DNA VAG QL NAA+PROBE: NOT DETECTED
CANDIDA GLABRATA / CANDIDA KRUSEI DNA: NOT DETECTED
T VAGINALIS DNA VAG QL NAA+PROBE: NOT DETECTED

## 2024-08-19 PROCEDURE — 0352U MULTIPLEX VAGINAL PANEL BY PCR: CPT

## 2024-08-19 PROCEDURE — 99213 OFFICE O/P EST LOW 20 MIN: CPT

## 2024-08-19 RX ORDER — MINOCYCLINE HYDROCHLORIDE 100 MG/1
100 CAPSULE ORAL 2 TIMES DAILY
COMMUNITY
Start: 2024-08-12

## 2024-08-19 RX ORDER — KETOCONAZOLE 20 MG/ML
SHAMPOO TOPICAL DAILY PRN
COMMUNITY

## 2024-08-19 RX ORDER — HYDROXYZINE HYDROCHLORIDE 25 MG/1
25 TABLET, FILM COATED ORAL DAILY PRN
COMMUNITY
Start: 2024-07-10

## 2024-08-19 ASSESSMENT — ANXIETY QUESTIONNAIRES
6. BECOMING EASILY ANNOYED OR IRRITABLE: NOT AT ALL
1. FEELING NERVOUS, ANXIOUS, OR ON EDGE: SEVERAL DAYS
GAD7 TOTAL SCORE: 1
7. FEELING AFRAID AS IF SOMETHING AWFUL MIGHT HAPPEN: NOT AT ALL
3. WORRYING TOO MUCH ABOUT DIFFERENT THINGS: NOT AT ALL
5. BEING SO RESTLESS THAT IT IS HARD TO SIT STILL: NOT AT ALL
GAD7 TOTAL SCORE: 1
2. NOT BEING ABLE TO STOP OR CONTROL WORRYING: NOT AT ALL
IF YOU CHECKED OFF ANY PROBLEMS ON THIS QUESTIONNAIRE, HOW DIFFICULT HAVE THESE PROBLEMS MADE IT FOR YOU TO DO YOUR WORK, TAKE CARE OF THINGS AT HOME, OR GET ALONG WITH OTHER PEOPLE: NOT DIFFICULT AT ALL

## 2024-08-19 ASSESSMENT — PATIENT HEALTH QUESTIONNAIRE - PHQ9
SUM OF ALL RESPONSES TO PHQ QUESTIONS 1-9: 0
5. POOR APPETITE OR OVEREATING: NOT AT ALL

## 2024-08-19 NOTE — PATIENT INSTRUCTIONS
Thank you for visiting the [facility].    Today we completed testing for vaginal infections. We will be in touch with your results as soon as they are complete with recommendations.     Please do not hesitate to contact the office if you have any questions or concerns.

## 2024-08-19 NOTE — PROGRESS NOTES
SUBJECTIVE:                                                   Vero Lua is a 58 year old female who presents to clinic today for the following health issue(s):  Patient presents with:  Vaginal Problem: Vaginal burning, itching, and foul odor symptoms for 2 weeks.  Tried Monistat last week and has no relief.    HPI:  Vero presents with vaginal burning, itching, and odor x 2 weeks. She has tried OTC monistat and OTC bacterial vaginosis treatment, but resulting in burning both times and no improvement in symptoms. She has not changed any detergents, soaps, creams, etc.     She is postmenopausal, sexually active with one partner, declines STI testing.     Patient's last menstrual period was 2012..     Patient is sexually active, .  Using menopause for contraception.    reports that she has never smoked. She has never used smokeless tobacco.    STD testing offered?  Declined    Health maintenance updated:  yes    Overdue          2011 HEPATITIS B IMMUNIZATION (3 of 3 - Hep B Twinrix 3-dose series)  Last completed: 2011     Never done ZOSTER IMMUNIZATION (1 of 2)     MAY 11  2021 DTAP/TDAP/TD IMMUNIZATION (2 - Td or Tdap)  Last completed: May 11, 2011     MAY 23  2023 YEARLY PREVENTIVE VISIT (Yearly)  Last completed: May 23, 2022     SEP 1  2023 COVID-19 Vaccine (3 -  season)  Last completed: May 15, 2021       Today's PHQ-2 Score:       2024     1:39 PM   PHQ-2 (  [employer])   Q1: Little interest or pleasure in doing things 0   Q2: Feeling down, depressed or hopeless 0   PHQ-2 Score 0     Today's PHQ-9 Score:       2024     1:39 PM   PHQ-9 SCORE   PHQ-9 Total Score 0     Today's BASILIO-7 Score:       2024     1:39 PM   BASILIO-7 SCORE   Total Score 1       Problem list and histories reviewed & adjusted, as indicated.  Additional history: as documented.    Patient Active Problem List   Diagnosis    Fibromyalgia    Restless leg syndrome    Interstitial cystitis     Migraines    Hypothyroid    Depression, major, in partial remission (H24)    Anxiety    Adenomatous polyp of colon    Chronic insomnia     Past Surgical History:   Procedure Laterality Date    COLONOSCOPY      ENDOSCOPIC ULTRASOUND UPPER GASTROINTESTINAL TRACT (GI) N/A 7/20/2020    Procedure: DIAGNOSTIC ENDOSCOPIC ULTRASOUND, ESOPHAGOSCOPY / UPPER GASTROINTESTINAL TRACT;  Surgeon: Juan Hurst MD;  Location:  OR    interstim procedure  2015, 2010 and 2008    [facility]    LAPAROSCOPIC CHOLECYSTECTOMY N/A 3/24/2021    Procedure: CHOLECYSTECTOMY, LAPAROSCOPIC;  Surgeon: William Leslie MD;  Location: [facility] [place]      Social History     Tobacco Use    Smoking status: Never    Smokeless tobacco: Never   Substance Use Topics    Alcohol use: No     Alcohol/week: 0.0 standard drinks of alcohol      Problem (# of Occurrences) Relation (Name,Age of Onset)    Depression (2) Sister, Father    Osteoporosis (1) Maternal Grandmother    Lymphoma (1) Father    Pancreatic Cancer (1) Mother    No Known Problems (1) Brother           Negative family history of: Deep Vein Thrombosis, Anesthesia Reaction              Current Outpatient Medications   Medication Sig Dispense Refill    BOTOX 200 units injection       buPROPion (WELLBUTRIN XL) 150 MG 24 hr tablet every morning       hydrOXYzine HCl (ATARAX) 25 MG tablet Take 25 mg by mouth daily as needed for anxiety      ketoconazole (NIZORAL) 2 % external shampoo Apply topically daily as needed      levothyroxine (SYNTHROID/LEVOTHROID) 100 MCG tablet Take 1 tablet (100 mcg) by mouth daily 90 tablet 0    loratadine (CLARITIN) 10 MG tablet Take 10 mg by mouth daily as needed  30 tablet 1    minocycline (MINOCIN) 100 MG capsule Take 100 mg by mouth 2 times daily      topiramate (TOPAMAX) 200 MG tablet Take 200 mg by mouth At Bedtime       vortioxetine (BRINTELLIX) 10 MG tablet Take 1 tablet (10 mg) by mouth daily (Patient taking differently: Take 10 mg by mouth at bedtime)  "90 tablet 3     No current facility-administered medications for this visit.     Allergies   Allergen Reactions    Adhesive Tape Rash     Some tapes and surgical glue     OBJECTIVE:     /70 (BP Location: Right arm)   Ht 1.702 m (5' 7\")   Wt 78.5 kg (173 lb)   LMP 06/27/2012   BMI 27.10 kg/m    Body mass index is 27.1 kg/m .    Exam:  Constitutional:  Appearance: Well nourished, well developed alert, in no acute distress  Neurologic:  Mental Status:  Oriented X3.  Normal strength and tone, sensory exam grossly normal, mentation intact and speech normal.    Psychiatric:  Mentation appears normal and affect normal/bright.  Pelvic Exam:  External Genitalia:     Normal appearance for age, no discharge present, no tenderness present, no inflammatory lesions present, color normal  Vagina:     Normal vaginal vault without central or paravaginal defects, no discharge present, no inflammatory lesions present, no masses present, tissue erythematous and inflamed appearing  Bladder:     Nontender to palpation  Urethra:   Urethral Body:  Urethra palpation normal, urethra structural support normal   Urethral Meatus:  No erythema or lesions present  Cervix:     Appearance healthy, no lesions present, nontender to palpation, no bleeding present  Adnexa:     No adnexal tenderness present, no adnexal masses present  Perineum:     Perineum within normal limits, no evidence of trauma, no rashes or skin lesions present  Anus:     Anus within normal limits, no hemorrhoids present  Inguinal Lymph Nodes:     No lymphadenopathy present  Pubic Hair:     Normal pubic hair distribution for age  Genitalia and Groin:     No rashes present, no lesions present, no areas of discoloration, no masses present     In-Clinic Test Results:  No results found for this or any previous visit (from the past 24 hour(s)).    ASSESSMENT/PLAN:                                                        ICD-10-CM    1. Vaginal discharge  N89.8 Multiplex " Vaginal Panel by PCR      2. Vaginal odor  N89.8 Multiplex Vaginal Panel by PCR          Patient Instructions   Thank you for visiting the [facility].    Today we completed testing for vaginal infections. We will be in touch with your results as soon as they are complete with recommendations.     Please do not hesitate to contact the office if you have any questions or concerns.     Vero presents with vaginal burning, itching, and odor x 2 weeks.      Vaginal Discharge / Odor   - no change with OTC treatments   - MVP swab collected  - discussed pathophysiology of tissue changes in postmenopausal period, more prone to irritation   - treatment pending results of testing  - discussed possible vaginal estrogen therapy if testing negative for infection or if infections recur.     Marleen Cochran PA-C  [facility]

## 2024-08-20 ENCOUNTER — MYC MEDICAL ADVICE (OUTPATIENT)
Dept: OBGYN | Facility: CLINIC | Age: 59
End: 2024-08-20
Payer: COMMERCIAL

## 2024-08-20 DIAGNOSIS — N89.8 VAGINAL ITCHING: ICD-10-CM

## 2024-08-20 DIAGNOSIS — N95.2 ATROPHIC VAGINITIS: Primary | ICD-10-CM

## 2024-08-21 RX ORDER — ESTRADIOL 0.1 MG/G
1 CREAM VAGINAL AT BEDTIME
Qty: 42.5 G | Refills: 5 | Status: SHIPPED | OUTPATIENT
Start: 2024-08-21

## 2024-08-21 NOTE — TELEPHONE ENCOUNTER
OV 8/19/24 with Li Pham presents with vaginal burning, itching, and odor x 2 weeks. She has tried OTC monistat and OTC bacterial vaginosis treatment, but resulting in burning both times and no improvement in symptoms.     - discussed possible vaginal estrogen therapy if testing negative for infection or if infections recur.     Pt would like to start estrogen cream    Pts preferred pharmacy:   [facility]/pharmacy #[id number]  [place], [place] - [address]     Routing pt mychart message to provider to advise.    Alicia Ochoa RN on 8/21/2024 at 9:45 AM  [facility] Triage

## 2024-08-30 ENCOUNTER — LAB REQUISITION (OUTPATIENT)
Dept: LAB | Facility: CLINIC | Age: 59
End: 2024-08-30
Payer: COMMERCIAL

## 2024-08-30 DIAGNOSIS — R21 RASH AND OTHER NONSPECIFIC SKIN ERUPTION: ICD-10-CM

## 2024-08-30 LAB
ALT SERPL W P-5'-P-CCNC: 19 U/L (ref 0–50)
AST SERPL W P-5'-P-CCNC: 21 U/L (ref 0–45)
BASOPHILS # BLD AUTO: 0 10E3/UL (ref 0–0.2)
BASOPHILS NFR BLD AUTO: 0 %
EOSINOPHIL # BLD AUTO: 0.1 10E3/UL (ref 0–0.7)
EOSINOPHIL NFR BLD AUTO: 2 %
ERYTHROCYTE [DISTWIDTH] IN BLOOD BY AUTOMATED COUNT: 13.4 % (ref 10–15)
HCT VFR BLD AUTO: 44.4 % (ref 35–47)
HGB BLD-MCNC: 13.5 G/DL (ref 11.7–15.7)
IMM GRANULOCYTES # BLD: 0 10E3/UL
IMM GRANULOCYTES NFR BLD: 0 %
LYMPHOCYTES # BLD AUTO: 1.9 10E3/UL (ref 0.8–5.3)
LYMPHOCYTES NFR BLD AUTO: 34 %
MCH RBC QN AUTO: 30.1 PG (ref 26.5–33)
MCHC RBC AUTO-ENTMCNC: 30.4 G/DL (ref 31.5–36.5)
MCV RBC AUTO: 99 FL (ref 78–100)
MONOCYTES # BLD AUTO: 0.4 10E3/UL (ref 0–1.3)
MONOCYTES NFR BLD AUTO: 8 %
NEUTROPHILS # BLD AUTO: 3.2 10E3/UL (ref 1.6–8.3)
NEUTROPHILS NFR BLD AUTO: 56 %
NRBC # BLD AUTO: 0 10E3/UL
NRBC BLD AUTO-RTO: 0 /100
PLATELET # BLD AUTO: 148 10E3/UL (ref 150–450)
RBC # BLD AUTO: 4.48 10E6/UL (ref 3.8–5.2)
WBC # BLD AUTO: 5.8 10E3/UL (ref 4–11)

## 2024-08-30 PROCEDURE — 84460 ALANINE AMINO (ALT) (SGPT): CPT | Mod: ORL | Performed by: SPECIALIST

## 2024-08-30 PROCEDURE — 84450 TRANSFERASE (AST) (SGOT): CPT | Mod: ORL | Performed by: SPECIALIST

## 2024-08-30 PROCEDURE — 82955 ASSAY OF G6PD ENZYME: CPT | Mod: ORL | Performed by: SPECIALIST

## 2024-08-30 PROCEDURE — 85025 COMPLETE CBC W/AUTO DIFF WBC: CPT | Mod: ORL | Performed by: SPECIALIST

## 2024-09-01 ENCOUNTER — HEALTH MAINTENANCE LETTER (OUTPATIENT)
Age: 59
End: 2024-09-01

## 2024-09-01 LAB — G6PD RBC-CCNT: 11.7 U/G HB

## 2024-10-02 ENCOUNTER — TRANSFERRED RECORDS (OUTPATIENT)
Dept: HEALTH INFORMATION MANAGEMENT | Facility: CLINIC | Age: 59
End: 2024-10-02
Payer: COMMERCIAL

## 2024-10-31 DIAGNOSIS — E03.9 HYPOTHYROIDISM, UNSPECIFIED TYPE: ICD-10-CM

## 2024-10-31 RX ORDER — LEVOTHYROXINE SODIUM 100 UG/1
100 TABLET ORAL DAILY
Qty: 90 TABLET | Refills: 0 | Status: SHIPPED | OUTPATIENT
Start: 2024-10-31

## 2024-11-05 DIAGNOSIS — E03.9 HYPOTHYROIDISM, UNSPECIFIED TYPE: ICD-10-CM

## 2024-11-05 RX ORDER — LEVOTHYROXINE SODIUM 100 UG/1
100 TABLET ORAL DAILY
Qty: 90 TABLET | Refills: 0 | OUTPATIENT
Start: 2024-11-05

## 2024-12-26 ENCOUNTER — TRANSFERRED RECORDS (OUTPATIENT)
Dept: HEALTH INFORMATION MANAGEMENT | Facility: CLINIC | Age: 59
End: 2024-12-26
Payer: COMMERCIAL

## 2025-01-21 ENCOUNTER — VIRTUAL VISIT (OUTPATIENT)
Dept: FAMILY MEDICINE | Facility: CLINIC | Age: 60
End: 2025-01-21
Payer: COMMERCIAL

## 2025-01-21 ENCOUNTER — TELEPHONE (OUTPATIENT)
Dept: FAMILY MEDICINE | Facility: CLINIC | Age: 60
End: 2025-01-21

## 2025-01-21 DIAGNOSIS — R20.8 BURNING SENSATION OF FEET: Primary | ICD-10-CM

## 2025-01-21 PROCEDURE — 98006 SYNCH AUDIO-VIDEO EST MOD 30: CPT | Performed by: NURSE PRACTITIONER

## 2025-01-21 RX ORDER — GABAPENTIN 600 MG/1
1 TABLET ORAL
COMMUNITY
Start: 2024-12-05

## 2025-01-21 ASSESSMENT — PATIENT HEALTH QUESTIONNAIRE - PHQ9
10. IF YOU CHECKED OFF ANY PROBLEMS, HOW DIFFICULT HAVE THESE PROBLEMS MADE IT FOR YOU TO DO YOUR WORK, TAKE CARE OF THINGS AT HOME, OR GET ALONG WITH OTHER PEOPLE: NOT DIFFICULT AT ALL
SUM OF ALL RESPONSES TO PHQ QUESTIONS 1-9: 2
SUM OF ALL RESPONSES TO PHQ QUESTIONS 1-9: 2

## 2025-01-21 NOTE — PROGRESS NOTES
"Vero is a 59 year old who is being evaluated via a billable video visit.    How would you like to obtain your AVS? MyChart  If the video visit is dropped, the invitation should be resent by: Text to cell phone: [phone]  Will anyone else be joining your video visit? No      Assessment & Plan     Burning sensation of feet  Exam is limited today due to virtual visit. Differentials include but not limited to neurogenic vs vascular claudication, vitamin deficiency or excess (recently started B12), thyroid dysregulation (active history of hypothyroid), nerve impingement or idiopathic neuropathy.  This could also be secondary to her topamax.  Will check labs today and treat accordingly.  Also placed a PT referral who could assess for impingement or claudication, but we can hold off on this until lab work completed.  Recommend follow up with PCP for in person assessment.  Could consider imaging or CLAUDIA pending exam findings.   - Physical Therapy  Referral; Future  - TSH with free T4 reflex; Future  - Comprehensive metabolic panel (BMP + Alb, Alk Phos, ALT, AST, Total. Bili, TP); Future  - Vitamin B12; Future  - CBC with platelets; Future  - Ferritin; Future  - Hemoglobin A1c; Future          BMI  Estimated body mass index is 27.1 kg/m  as calculated from the following:    Height as of 8/19/24: 1.702 m (5' 7\").    Weight as of 8/19/24: 78.5 kg (173 lb).             Subjective   Vero is a 59 year old, presenting for the following health issues:  Burning Feet  (Symptoms>for the last 2 month, burning sensation on the sole of feet. Never had this symptoms before. )      1/21/2025    11:48 AM   Additional Questions   Roomed by JOSUE Xavier   Accompanied by self         1/21/2025    11:48 AM   Patient Reported Additional Medications   Patient reports taking the following new medications none     History of Present Illness       Reason for visit:  Burning feet  Symptom onset:  More than a month  Symptoms include:  " "Burning feet at night, sometimes continues into the day  Symptom intensity:  Severe  Symptom progression:  Worsening  Had these symptoms before:  No  What makes it better:  Ice   She is taking medications regularly.       Ongoing last couple months.  Increasing in frequency, every night.  Happens 30-60 minutes after going to bed  Soles of feet feel like burning,  Has to get up and use Ice  Now last longer into the daytime.  Just started b12 in the last week.    Gabapentin tapering off recently.    History of hyperthyroid.                  Objective    Vitals - Patient Reported  Weight (Patient Reported): 79.4 kg (175 lb)  Height (Patient Reported): 172.7 cm (5' 8\")  BMI (Based on Pt Reported Ht/Wt): 26.61  Pain Score: No Pain (0)        Physical Exam   GENERAL: alert and no distress  EYES: Eyes grossly normal to inspection.  No discharge or erythema, or obvious scleral/conjunctival abnormalities.  RESP: No audible wheeze, cough, or visible cyanosis.    SKIN: Visible skin clear. No significant rash, abnormal pigmentation or lesions.  NEURO: Cranial nerves grossly intact.  Mentation and speech appropriate for age.  PSYCH: Appropriate affect, tone, and pace of words          Video-Visit Details    Type of service:  Video Visit   Originating Location (pt. Location): Home    Distant Location (provider location):  Off-site  Platform used for Video Visit: Sharon  Signed Electronically by: Nahomi Downing DNP    "

## 2025-01-23 ENCOUNTER — LAB (OUTPATIENT)
Dept: LAB | Facility: CLINIC | Age: 60
End: 2025-01-23
Payer: COMMERCIAL

## 2025-01-23 DIAGNOSIS — R20.8 BURNING SENSATION OF FEET: ICD-10-CM

## 2025-01-23 LAB
ERYTHROCYTE [DISTWIDTH] IN BLOOD BY AUTOMATED COUNT: 13 % (ref 10–15)
EST. AVERAGE GLUCOSE BLD GHB EST-MCNC: 105 MG/DL
HBA1C MFR BLD: 5.3 % (ref 0–5.6)
HCT VFR BLD AUTO: 44.9 % (ref 35–47)
HGB BLD-MCNC: 13.9 G/DL (ref 11.7–15.7)
MCH RBC QN AUTO: 29.6 PG (ref 26.5–33)
MCHC RBC AUTO-ENTMCNC: 31 G/DL (ref 31.5–36.5)
MCV RBC AUTO: 96 FL (ref 78–100)
PLATELET # BLD AUTO: 205 10E3/UL (ref 150–450)
RBC # BLD AUTO: 4.7 10E6/UL (ref 3.8–5.2)
WBC # BLD AUTO: 4.9 10E3/UL (ref 4–11)

## 2025-01-28 ENCOUNTER — MYC MEDICAL ADVICE (OUTPATIENT)
Dept: FAMILY MEDICINE | Facility: CLINIC | Age: 60
End: 2025-01-28
Payer: COMMERCIAL

## 2025-01-30 NOTE — TELEPHONE ENCOUNTER
Writer responded via [employer].  SANJAY StaufferN, RN-BC  [facility]

## 2025-01-30 NOTE — TELEPHONE ENCOUNTER
Nahomi-Please review and advise if okay for appt with you tomorrow for follow up? (Triage slot/approval required).    Thank you!  SANJAY StaufferN, RN-BC  [facility]

## 2025-02-27 ENCOUNTER — TRANSFERRED RECORDS (OUTPATIENT)
Dept: HEALTH INFORMATION MANAGEMENT | Facility: CLINIC | Age: 60
End: 2025-02-27
Payer: COMMERCIAL

## 2025-03-13 ENCOUNTER — TRANSFERRED RECORDS (OUTPATIENT)
Dept: HEALTH INFORMATION MANAGEMENT | Facility: CLINIC | Age: 60
End: 2025-03-13
Payer: COMMERCIAL

## 2025-03-20 ENCOUNTER — TRANSFERRED RECORDS (OUTPATIENT)
Dept: HEALTH INFORMATION MANAGEMENT | Facility: CLINIC | Age: 60
End: 2025-03-20
Payer: COMMERCIAL

## 2025-03-31 DIAGNOSIS — E03.9 HYPOTHYROIDISM, UNSPECIFIED TYPE: ICD-10-CM

## 2025-03-31 NOTE — TELEPHONE ENCOUNTER
Medication Question or Refill    Contacts       Contact Date/Time Type Contact Phone/Fax    03/31/2025 09:20 AM CDT Phone (Incoming) Vero Lua (Self) [phone] (M)            What medication are you calling about (include dose and sig)?: levothyroxine     Preferred Pharmacy:     [facility]/pharmacy #[id number] - [place] - [address]  [address] [address]  Phone: [phone] Fax: [phone]      Controlled Substance Agreement on file:   CSA -- Patient Level:    CSA: None found at the patient level.       Who prescribed the medication?: Dr. Jara    Do you need a refill? Yes    When did you use the medication last? This morning    Patient offered an appointment? Yes: 04/21/25 PREVENTATIVE     Do you have any questions or concerns?  No      Could we send this information to you in [facility] or would you prefer to receive a phone call?:   Patient would prefer a phone call   Okay to leave a detailed message?: Yes at Cell number on file:    Telephone Information:   Mobile [phone]

## 2025-04-03 RX ORDER — LEVOTHYROXINE SODIUM 100 UG/1
100 TABLET ORAL DAILY
Qty: 90 TABLET | Refills: 0 | Status: SHIPPED | OUTPATIENT
Start: 2025-04-03

## 2025-04-03 NOTE — TELEPHONE ENCOUNTER
Patient calling again to check on status of refill request. No response from RN refill pool. Sent again as high priority message per patient's request.     Queta Aaron RN

## 2025-04-21 ENCOUNTER — OFFICE VISIT (OUTPATIENT)
Dept: FAMILY MEDICINE | Facility: CLINIC | Age: 60
End: 2025-04-21
Payer: COMMERCIAL

## 2025-04-21 VITALS
TEMPERATURE: 98.9 F | HEART RATE: 78 BPM | OXYGEN SATURATION: 100 % | HEIGHT: 67 IN | BODY MASS INDEX: 29.51 KG/M2 | SYSTOLIC BLOOD PRESSURE: 109 MMHG | DIASTOLIC BLOOD PRESSURE: 76 MMHG | WEIGHT: 188 LBS | RESPIRATION RATE: 16 BRPM

## 2025-04-21 DIAGNOSIS — M79.7 FIBROMYALGIA: ICD-10-CM

## 2025-04-21 DIAGNOSIS — D17.23 LIPOMA OF RIGHT LOWER EXTREMITY: ICD-10-CM

## 2025-04-21 DIAGNOSIS — F51.04 CHRONIC INSOMNIA: ICD-10-CM

## 2025-04-21 DIAGNOSIS — F33.41 RECURRENT MAJOR DEPRESSIVE DISORDER, IN PARTIAL REMISSION: ICD-10-CM

## 2025-04-21 DIAGNOSIS — Z00.00 ENCOUNTER FOR ANNUAL PHYSICAL EXAM: Primary | ICD-10-CM

## 2025-04-21 DIAGNOSIS — G43.809 OTHER MIGRAINE WITHOUT STATUS MIGRAINOSUS, NOT INTRACTABLE: ICD-10-CM

## 2025-04-21 DIAGNOSIS — E03.9 HYPOTHYROIDISM, UNSPECIFIED TYPE: ICD-10-CM

## 2025-04-21 DIAGNOSIS — G25.81 RESTLESS LEG SYNDROME: ICD-10-CM

## 2025-04-21 DIAGNOSIS — D12.6 ADENOMATOUS POLYP OF COLON, UNSPECIFIED PART OF COLON: ICD-10-CM

## 2025-04-21 DIAGNOSIS — F41.9 ANXIETY: ICD-10-CM

## 2025-04-21 PROCEDURE — 3074F SYST BP LT 130 MM HG: CPT | Performed by: INTERNAL MEDICINE

## 2025-04-21 PROCEDURE — 36415 COLL VENOUS BLD VENIPUNCTURE: CPT | Performed by: INTERNAL MEDICINE

## 2025-04-21 PROCEDURE — 84439 ASSAY OF FREE THYROXINE: CPT | Performed by: INTERNAL MEDICINE

## 2025-04-21 PROCEDURE — 80061 LIPID PANEL: CPT | Performed by: INTERNAL MEDICINE

## 2025-04-21 PROCEDURE — 1126F AMNT PAIN NOTED NONE PRSNT: CPT | Performed by: INTERNAL MEDICINE

## 2025-04-21 PROCEDURE — 84443 ASSAY THYROID STIM HORMONE: CPT | Performed by: INTERNAL MEDICINE

## 2025-04-21 PROCEDURE — 99396 PREV VISIT EST AGE 40-64: CPT | Performed by: INTERNAL MEDICINE

## 2025-04-21 PROCEDURE — 3078F DIAST BP <80 MM HG: CPT | Performed by: INTERNAL MEDICINE

## 2025-04-21 RX ORDER — LEVOTHYROXINE SODIUM 100 UG/1
100 TABLET ORAL DAILY
Qty: 90 TABLET | Refills: 3 | Status: SHIPPED | OUTPATIENT
Start: 2025-04-21

## 2025-04-21 SDOH — HEALTH STABILITY: PHYSICAL HEALTH: ON AVERAGE, HOW MANY DAYS PER WEEK DO YOU ENGAGE IN MODERATE TO STRENUOUS EXERCISE (LIKE A BRISK WALK)?: 2 DAYS

## 2025-04-21 ASSESSMENT — SOCIAL DETERMINANTS OF HEALTH (SDOH): HOW OFTEN DO YOU GET TOGETHER WITH FRIENDS OR RELATIVES?: TWICE A WEEK

## 2025-04-21 ASSESSMENT — PAIN SCALES - GENERAL: PAINLEVEL_OUTOF10: NO PAIN (0)

## 2025-04-21 NOTE — PROGRESS NOTES
Preventive Care Visit  [facility] Jara MD, Internal Medicine  Apr 21, 2025          Raisa Pham is a 59 year old, presenting for the following:    She is doing very well.  Her depression is controlled.  She is not working out a lot and her weight is up.  She is up-to-date with gynecology.  She is up-to-date on mammogram and colon exam.    She does note for several years she has had a lump on the right thigh that has gotten a bit bigger and does hurt at times.               Past Medical History:      Past Medical History:   Diagnosis Date    Adenomatous polyp of colon 05/2016    fu 5 years, [place] [facility]; fu done 2021 and nl    Anxiety     Chronic insomnia     on ambien and klonopin for over 10 years together    Depression, major, in partial remission     was seeing psyche until 2015, not since then, as of 2022 seeing Dr. Bryant Flower    Dilated bile duct 2020    extrahepatic, seen on ct for ruq pain, then eus done and no stricture seen    Dysesthesia 2024    of feet, seeing Dr. Ko of neuro    Fibromyalgia 1998    Hypothyroid 90's    Interstitial cystitis     urol [place] and had interstem    Migraines     Dr. Cash of neuro, got botox 2012 via [facility] Dr. Kevin, now Dr. Watkins, then Dr. Ko    Restless leg syndrome     Urgency of urination     interstim             Past Surgical History:      Past Surgical History:   Procedure Laterality Date    COLONOSCOPY      ENDOSCOPIC ULTRASOUND UPPER GASTROINTESTINAL TRACT (GI) N/A 7/20/2020    Procedure: DIAGNOSTIC ENDOSCOPIC ULTRASOUND, ESOPHAGOSCOPY / UPPER GASTROINTESTINAL TRACT;  Surgeon: Juan Hurst MD;  Location:  OR    interstim procedure  2015, 2010 and 2008    [facility]    LAPAROSCOPIC CHOLECYSTECTOMY N/A 3/24/2021    Procedure: CHOLECYSTECTOMY, LAPAROSCOPIC;  Surgeon: William Leslie MD;  Location: [facility] [place]             Social History:     Social History     Socioeconomic History    Marital status:       Spouse name: Not on file    Number of children: 1    Years of education: Not on file    Highest education level: Not on file   Occupational History    Occupation: homemaker     Employer: NONE     Occupation: teacher   Tobacco Use    Smoking status: Never    Smokeless tobacco: Never   Vaping Use    Vaping status: Never Used   Substance and Sexual Activity    Alcohol use: No     Alcohol/week: 0.0 standard drinks of alcohol    Drug use: No    Sexual activity: Yes     Partners: Male     Birth control/protection: Post-menopausal   Other Topics Concern    Parent/sibling w/ CABG, MI or angioplasty before 65F 55M? Not Asked   Social History Narrative    Not on file     Social Drivers of Health     Financial Resource Strain: Low Risk  (4/21/2025)    Financial Resource Strain     Within the past 12 months, have you or your family members you live with been unable to get utilities (heat, electricity) when it was really needed?: No   Food Insecurity: Low Risk  (4/21/2025)    Food Insecurity     Within the past 12 months, did you worry that your food would run out before you got money to buy more?: No     Within the past 12 months, did the food you bought just not last and you didn t have money to get more?: No   Transportation Needs: Low Risk  (4/21/2025)    Transportation Needs     Within the past 12 months, has lack of transportation kept you from medical appointments, getting your medicines, non-medical meetings or appointments, work, or from getting things that you need?: No   Physical Activity: Unknown (4/21/2025)    Exercise Vital Sign     Days of Exercise per Week: 2 days     Minutes of Exercise per Session: Not on file   Stress: Stress Concern Present (4/21/2025)    Citizen of the [place] [employer] Occupational Health - Occupational Stress Questionnaire     Feeling of Stress : To some extent   Social Connections: Unknown (4/21/2025)    Social Connection and Isolation Panel [NHANES]     Frequency of Communication with Friends and  "Family: Not on file     Frequency of Social Gatherings with Friends and Family: Twice a week     Attends Latter day Services: Not on file     Active Member of Clubs or Organizations: Not on file     Attends Club or Organization Meetings: Not on file     Marital Status: Not on file   Interpersonal Safety: Low Risk  (4/21/2025)    Interpersonal Safety     Do you feel physically and emotionally safe where you currently live?: Yes     Within the past 12 months, have you been hit, slapped, kicked or otherwise physically hurt by someone?: No     Within the past 12 months, have you been humiliated or emotionally abused in other ways by your partner or ex-partner?: No   Housing Stability: Low Risk  (4/21/2025)    Housing Stability     Do you have housing? : Yes     Are you worried about losing your housing?: No             Family History:   reviewed         Allergies:     Allergies   Allergen Reactions    Adhesive Tape Rash     Some tapes and surgical glue             Medications:     Current Outpatient Medications   Medication Sig Dispense Refill    BOTOX 200 units injection       buPROPion (WELLBUTRIN XL) 150 MG 24 hr tablet every morning       gabapentin (NEURONTIN) 600 MG tablet Take 1 tablet (600 mg) by mouth 3 times daily. 270 tablet 1    levothyroxine (SYNTHROID/LEVOTHROID) 100 MCG tablet Take 1 tablet (100 mcg) by mouth daily. 90 tablet 3    loratadine (CLARITIN) 10 MG tablet Take 10 mg by mouth daily as needed  30 tablet 1    topiramate (TOPAMAX) 200 MG tablet Take 200 mg by mouth At Bedtime       vortioxetine (BRINTELLIX) 10 MG tablet Take 1 tablet (10 mg) by mouth daily 90 tablet 3               Review of Systems:     The 10 point Review of Systems is negative other than noted in the HPI           Physical Exam:   Blood pressure 109/76, pulse 78, temperature 98.9  F (37.2  C), resp. rate 16, height 1.712 m (5' 7.4\"), weight 85.3 kg (188 lb), last menstrual period 06/27/2012, SpO2 100%, not currently " breastfeeding.    Exam:  Constitutional: healthy appearing, alert and in no distress  Heent: Normocephalic. Head without obvious masses or lesions. PERRLDC, EOMI. Mouth exam within normal limits: tongue, mucous membranes, posterior pharynx all normal, no lesions or abnormalities seen.  Tm's and canals within normal limits bilaterally. Neck supple, no nuchal rigidity or masses. No supraclavicular, or cervical adenopathy. Thyroid symmetric, no masses.  Cardiovascular: Regular rate and rhythm, no murmer, rub or gallops.  JVP not elevated, no edema.  Carotids within normal limits bilaterally, no bruits.  Respiratory: Normal respiratory effort.  Lungs clear, normal flow, no wheezing or crackles.  Gastrointestinal: Normal active bowel sounds.   Soft, not tender, no masses, guarding or rebound.  No hepatosplenomegaly.   Musculoskeletal: extremities normal, no gross deformities noted.  Skin: no suspicious lesions or rashes   Neurologic: Mental status within normal limits.  Speech fluent.  No gross motor abnormalities and gait intact.  Psychiatric: mentation appears normal and affect normal.  On her right lateral thigh towards the buttock area there is enlargement of the soft tissue versus the left side.  Is not red or warm or tender.         Data:   Prior labs noted, other sent today        Assessment:   Normal complete physical exam  Mass of thigh/buttock, I believe this is a lipoma.  I discussed with the patient imaging versus surgical eval and we will do the surgery evaluation.  She will schedule this.  Depression and anxiety, controlled  Colon polyp, follow-up as noted  Chronic insomnia  Hypothyroidism, check TSH  Migraines, controlled  Restless leg syndrome  Healthcare maintenance         Plan:   Vaccines at pharmacy  Follow-up gynecology for Pap and mammogram  Exercise and diet  See surgery  Letter with labs      Emile Jara M.D.

## 2025-04-22 LAB
CHOLEST SERPL-MCNC: 191 MG/DL
FASTING STATUS PATIENT QL REPORTED: NO
HDLC SERPL-MCNC: 70 MG/DL
LDLC SERPL CALC-MCNC: 96 MG/DL
NONHDLC SERPL-MCNC: 121 MG/DL
T4 FREE SERPL-MCNC: 1.18 NG/DL (ref 0.9–1.7)
TRIGL SERPL-MCNC: 126 MG/DL
TSH SERPL DL<=0.005 MIU/L-ACNC: 4.71 UIU/ML (ref 0.3–4.2)

## 2025-04-22 NOTE — RESULT ENCOUNTER NOTE
It was very nice to see you.  Your labs look good.    Your total cholesterol is 191.  This is a bit higher so please work on exercise and a healthy diet.  Your good cholesterol is super, the HDL and your LDL or bad is just fine.    Of the 2 thyroid tests 1 is barely off and improved.  I do not think any changes need to be made.    Please let me know if you have questions.    Emile Jara M.D.

## 2025-06-17 ENCOUNTER — APPOINTMENT (OUTPATIENT)
Dept: GENERAL RADIOLOGY | Facility: CLINIC | Age: 60
End: 2025-06-17
Attending: EMERGENCY MEDICINE
Payer: COMMERCIAL

## 2025-06-17 ENCOUNTER — HOSPITAL ENCOUNTER (INPATIENT)
Facility: CLINIC | Age: 60
LOS: 1 days | Discharge: HOME | End: 2025-06-18
Attending: EMERGENCY MEDICINE | Admitting: HOSPITALIST
Payer: COMMERCIAL

## 2025-06-17 DIAGNOSIS — I24.9 ACS (ACUTE CORONARY SYNDROME) (H): ICD-10-CM

## 2025-06-17 DIAGNOSIS — R07.89 OTHER CHEST PAIN: Primary | ICD-10-CM

## 2025-06-17 LAB
ANION GAP SERPL CALCULATED.3IONS-SCNC: 11 MMOL/L (ref 7–15)
ATRIAL RATE - MUSE: 58 BPM
BASOPHILS # BLD AUTO: 0 10E3/UL (ref 0–0.2)
BASOPHILS NFR BLD AUTO: 0 %
BUN SERPL-MCNC: 12.8 MG/DL (ref 8–23)
CALCIUM SERPL-MCNC: 8.8 MG/DL (ref 8.8–10.4)
CHLORIDE SERPL-SCNC: 105 MMOL/L (ref 98–107)
CREAT SERPL-MCNC: 0.91 MG/DL (ref 0.51–0.95)
D DIMER PPP FEU-MCNC: 0.35 UG/ML FEU (ref 0–0.5)
DIASTOLIC BLOOD PRESSURE - MUSE: NORMAL MMHG
EGFRCR SERPLBLD CKD-EPI 2021: 72 ML/MIN/1.73M2
EOSINOPHIL # BLD AUTO: 0.2 10E3/UL (ref 0–0.7)
EOSINOPHIL NFR BLD AUTO: 2 %
ERYTHROCYTE [DISTWIDTH] IN BLOOD BY AUTOMATED COUNT: 13.4 % (ref 10–15)
ERYTHROCYTE [DISTWIDTH] IN BLOOD BY AUTOMATED COUNT: 13.6 % (ref 10–15)
EST. AVERAGE GLUCOSE BLD GHB EST-MCNC: 97 MG/DL
FLUAV RNA SPEC QL NAA+PROBE: NEGATIVE
FLUBV RNA RESP QL NAA+PROBE: NEGATIVE
GLUCOSE SERPL-MCNC: 93 MG/DL (ref 70–99)
HBA1C MFR BLD: 5 %
HCO3 SERPL-SCNC: 25 MMOL/L (ref 22–29)
HCT VFR BLD AUTO: 41.3 % (ref 35–47)
HCT VFR BLD AUTO: 41.9 % (ref 35–47)
HGB BLD-MCNC: 12.8 G/DL (ref 11.7–15.7)
HGB BLD-MCNC: 13.4 G/DL (ref 11.7–15.7)
HOLD SPECIMEN: NORMAL
IMM GRANULOCYTES # BLD: 0 10E3/UL
IMM GRANULOCYTES NFR BLD: 0 %
INTERPRETATION ECG - MUSE: NORMAL
LYMPHOCYTES # BLD AUTO: 1.2 10E3/UL (ref 0.8–5.3)
LYMPHOCYTES NFR BLD AUTO: 17 %
MCH RBC QN AUTO: 30 PG (ref 26.5–33)
MCH RBC QN AUTO: 30.3 PG (ref 26.5–33)
MCHC RBC AUTO-ENTMCNC: 31 G/DL (ref 31.5–36.5)
MCHC RBC AUTO-ENTMCNC: 32 G/DL (ref 31.5–36.5)
MCV RBC AUTO: 95 FL (ref 78–100)
MCV RBC AUTO: 97 FL (ref 78–100)
MONOCYTES # BLD AUTO: 0.5 10E3/UL (ref 0–1.3)
MONOCYTES NFR BLD AUTO: 7 %
NEUTROPHILS # BLD AUTO: 5.3 10E3/UL (ref 1.6–8.3)
NEUTROPHILS NFR BLD AUTO: 74 %
NRBC # BLD AUTO: 0 10E3/UL
NRBC BLD AUTO-RTO: 0 /100
P AXIS - MUSE: 73 DEGREES
PLATELET # BLD AUTO: 160 10E3/UL (ref 150–450)
PLATELET # BLD AUTO: 176 10E3/UL (ref 150–450)
POTASSIUM SERPL-SCNC: 4.1 MMOL/L (ref 3.4–5.3)
PR INTERVAL - MUSE: 158 MS
QRS DURATION - MUSE: 70 MS
QT - MUSE: 392 MS
QTC - MUSE: 384 MS
R AXIS - MUSE: 49 DEGREES
RBC # BLD AUTO: 4.27 10E6/UL (ref 3.8–5.2)
RBC # BLD AUTO: 4.42 10E6/UL (ref 3.8–5.2)
RSV RNA SPEC NAA+PROBE: NEGATIVE
SARS-COV-2 RNA RESP QL NAA+PROBE: NEGATIVE
SODIUM SERPL-SCNC: 141 MMOL/L (ref 135–145)
SYSTOLIC BLOOD PRESSURE - MUSE: NORMAL MMHG
T AXIS - MUSE: 39 DEGREES
TROPONIN T SERPL HS-MCNC: 19 NG/L
TROPONIN T SERPL HS-MCNC: 25 NG/L
TROPONIN T SERPL HS-MCNC: 34 NG/L
TROPONIN T SERPL HS-MCNC: 42 NG/L
TSH SERPL DL<=0.005 MIU/L-ACNC: 2.18 UIU/ML (ref 0.3–4.2)
UFH PPP CHRO-ACNC: 0.71 IU/ML (ref ?–1.1)
VENTRICULAR RATE- MUSE: 58 BPM
WBC # BLD AUTO: 6.4 10E3/UL (ref 4–11)
WBC # BLD AUTO: 7.1 10E3/UL (ref 4–11)

## 2025-06-17 PROCEDURE — 71046 X-RAY EXAM CHEST 2 VIEWS: CPT

## 2025-06-17 PROCEDURE — 84484 ASSAY OF TROPONIN QUANT: CPT | Performed by: EMERGENCY MEDICINE

## 2025-06-17 PROCEDURE — 36415 COLL VENOUS BLD VENIPUNCTURE: CPT | Performed by: EMERGENCY MEDICINE

## 2025-06-17 PROCEDURE — 99223 1ST HOSP IP/OBS HIGH 75: CPT

## 2025-06-17 PROCEDURE — 250N000011 HC RX IP 250 OP 636

## 2025-06-17 PROCEDURE — 85379 FIBRIN DEGRADATION QUANT: CPT | Performed by: EMERGENCY MEDICINE

## 2025-06-17 PROCEDURE — 85018 HEMOGLOBIN: CPT | Performed by: EMERGENCY MEDICINE

## 2025-06-17 PROCEDURE — 80048 BASIC METABOLIC PNL TOTAL CA: CPT | Performed by: EMERGENCY MEDICINE

## 2025-06-17 PROCEDURE — 84443 ASSAY THYROID STIM HORMONE: CPT | Performed by: EMERGENCY MEDICINE

## 2025-06-17 PROCEDURE — 250N000013 HC RX MED GY IP 250 OP 250 PS 637

## 2025-06-17 PROCEDURE — 84484 ASSAY OF TROPONIN QUANT: CPT

## 2025-06-17 PROCEDURE — 36415 COLL VENOUS BLD VENIPUNCTURE: CPT | Performed by: HOSPITALIST

## 2025-06-17 PROCEDURE — 93005 ELECTROCARDIOGRAM TRACING: CPT

## 2025-06-17 PROCEDURE — 87637 SARSCOV2&INF A&B&RSV AMP PRB: CPT

## 2025-06-17 PROCEDURE — 85520 HEPARIN ASSAY: CPT | Performed by: HOSPITALIST

## 2025-06-17 PROCEDURE — 36415 COLL VENOUS BLD VENIPUNCTURE: CPT

## 2025-06-17 PROCEDURE — 83036 HEMOGLOBIN GLYCOSYLATED A1C: CPT

## 2025-06-17 PROCEDURE — 85004 AUTOMATED DIFF WBC COUNT: CPT | Performed by: EMERGENCY MEDICINE

## 2025-06-17 PROCEDURE — 85025 COMPLETE CBC W/AUTO DIFF WBC: CPT | Performed by: EMERGENCY MEDICINE

## 2025-06-17 PROCEDURE — 250N000011 HC RX IP 250 OP 636: Performed by: EMERGENCY MEDICINE

## 2025-06-17 PROCEDURE — 210N000002 HC R&B HEART CARE

## 2025-06-17 PROCEDURE — 99285 EMERGENCY DEPT VISIT HI MDM: CPT | Mod: 25

## 2025-06-17 RX ORDER — LEVOTHYROXINE SODIUM 100 UG/1
100 TABLET ORAL DAILY
Status: DISCONTINUED | OUTPATIENT
Start: 2025-06-17 | End: 2025-06-18 | Stop reason: HOSPADM

## 2025-06-17 RX ORDER — TOPIRAMATE 50 MG/1
200 TABLET, FILM COATED ORAL AT BEDTIME
Status: DISCONTINUED | OUTPATIENT
Start: 2025-06-17 | End: 2025-06-18 | Stop reason: HOSPADM

## 2025-06-17 RX ORDER — PROCHLORPERAZINE MALEATE 10 MG
10 TABLET ORAL EVERY 6 HOURS PRN
Status: DISCONTINUED | OUTPATIENT
Start: 2025-06-17 | End: 2025-06-18 | Stop reason: HOSPADM

## 2025-06-17 RX ORDER — CALCIUM CARBONATE 500 MG/1
1000 TABLET, CHEWABLE ORAL 4 TIMES DAILY PRN
Status: DISCONTINUED | OUTPATIENT
Start: 2025-06-17 | End: 2025-06-18 | Stop reason: HOSPADM

## 2025-06-17 RX ORDER — BUPROPION HYDROCHLORIDE 150 MG/1
300 TABLET ORAL EVERY MORNING
Status: DISCONTINUED | OUTPATIENT
Start: 2025-06-18 | End: 2025-06-18 | Stop reason: HOSPADM

## 2025-06-17 RX ORDER — ONDANSETRON 2 MG/ML
4 INJECTION INTRAMUSCULAR; INTRAVENOUS EVERY 6 HOURS PRN
Status: DISCONTINUED | OUTPATIENT
Start: 2025-06-17 | End: 2025-06-18 | Stop reason: HOSPADM

## 2025-06-17 RX ORDER — NALOXONE HYDROCHLORIDE 0.4 MG/ML
0.2 INJECTION, SOLUTION INTRAMUSCULAR; INTRAVENOUS; SUBCUTANEOUS
Status: DISCONTINUED | OUTPATIENT
Start: 2025-06-17 | End: 2025-06-18 | Stop reason: HOSPADM

## 2025-06-17 RX ORDER — AMOXICILLIN 250 MG
1 CAPSULE ORAL 2 TIMES DAILY PRN
Status: DISCONTINUED | OUTPATIENT
Start: 2025-06-17 | End: 2025-06-18 | Stop reason: HOSPADM

## 2025-06-17 RX ORDER — NALOXONE HYDROCHLORIDE 0.4 MG/ML
0.4 INJECTION, SOLUTION INTRAMUSCULAR; INTRAVENOUS; SUBCUTANEOUS
Status: DISCONTINUED | OUTPATIENT
Start: 2025-06-17 | End: 2025-06-18 | Stop reason: HOSPADM

## 2025-06-17 RX ORDER — LIDOCAINE 40 MG/G
CREAM TOPICAL
Status: DISCONTINUED | OUTPATIENT
Start: 2025-06-17 | End: 2025-06-18 | Stop reason: HOSPADM

## 2025-06-17 RX ORDER — RIZATRIPTAN BENZOATE 10 MG/1
10 TABLET ORAL
Status: DISCONTINUED | OUTPATIENT
Start: 2025-06-17 | End: 2025-06-18 | Stop reason: HOSPADM

## 2025-06-17 RX ORDER — NITROGLYCERIN 0.4 MG/1
0.4 TABLET SUBLINGUAL EVERY 5 MIN PRN
Status: DISCONTINUED | OUTPATIENT
Start: 2025-06-17 | End: 2025-06-18 | Stop reason: HOSPADM

## 2025-06-17 RX ORDER — HEPARIN SODIUM 10000 [USP'U]/100ML
0-5000 INJECTION, SOLUTION INTRAVENOUS CONTINUOUS
Status: DISCONTINUED | OUTPATIENT
Start: 2025-06-17 | End: 2025-06-18

## 2025-06-17 RX ORDER — ERGOCALCIFEROL 1.25 MG/1
50000 CAPSULE ORAL WEEKLY
COMMUNITY

## 2025-06-17 RX ORDER — ACETAMINOPHEN 650 MG/1
650 SUPPOSITORY RECTAL EVERY 4 HOURS PRN
Status: DISCONTINUED | OUTPATIENT
Start: 2025-06-17 | End: 2025-06-18 | Stop reason: HOSPADM

## 2025-06-17 RX ORDER — CICLOPIROX 1 G/100ML
SHAMPOO TOPICAL
COMMUNITY
Start: 2025-05-09

## 2025-06-17 RX ORDER — BUPROPION HYDROCHLORIDE 300 MG/1
300 TABLET ORAL EVERY MORNING
COMMUNITY
Start: 2025-05-20

## 2025-06-17 RX ORDER — VORTIOXETINE 20 MG/1
20 TABLET, FILM COATED ORAL AT BEDTIME
COMMUNITY
Start: 2025-05-20

## 2025-06-17 RX ORDER — CALCIPOTRIENE 0.05 MG/ML
1 SOLUTION TOPICAL 2 TIMES DAILY
Status: DISCONTINUED | OUTPATIENT
Start: 2025-06-17 | End: 2025-06-18 | Stop reason: HOSPADM

## 2025-06-17 RX ORDER — NAPROXEN SODIUM 220 MG/1
440 TABLET, FILM COATED ORAL 2 TIMES DAILY PRN
COMMUNITY

## 2025-06-17 RX ORDER — DUTASTERIDE 0.5 MG/1
0.5 CAPSULE, LIQUID FILLED ORAL EVERY MORNING
COMMUNITY

## 2025-06-17 RX ORDER — RIZATRIPTAN BENZOATE 10 MG/1
10 TABLET ORAL
COMMUNITY
Start: 2025-03-20

## 2025-06-17 RX ORDER — GABAPENTIN 600 MG/1
600 TABLET ORAL AT BEDTIME
Status: DISCONTINUED | OUTPATIENT
Start: 2025-06-17 | End: 2025-06-18 | Stop reason: HOSPADM

## 2025-06-17 RX ORDER — ROFLUMILAST 3 MG/G
AEROSOL, FOAM TOPICAL DAILY
COMMUNITY
Start: 2025-06-17

## 2025-06-17 RX ORDER — ONDANSETRON 4 MG/1
4 TABLET, ORALLY DISINTEGRATING ORAL EVERY 6 HOURS PRN
Status: DISCONTINUED | OUTPATIENT
Start: 2025-06-17 | End: 2025-06-18 | Stop reason: HOSPADM

## 2025-06-17 RX ORDER — MORPHINE SULFATE 2 MG/ML
2 INJECTION, SOLUTION INTRAMUSCULAR; INTRAVENOUS
Status: DISCONTINUED | OUTPATIENT
Start: 2025-06-17 | End: 2025-06-18 | Stop reason: HOSPADM

## 2025-06-17 RX ORDER — LORATADINE 10 MG/1
10 TABLET ORAL DAILY
Status: DISCONTINUED | OUTPATIENT
Start: 2025-06-17 | End: 2025-06-18 | Stop reason: HOSPADM

## 2025-06-17 RX ORDER — NEMOLIZUMAB-ILTO 30 MG/100MG
30 INJECTION, POWDER, LYOPHILIZED, FOR SOLUTION SUBCUTANEOUS
COMMUNITY
Start: 2025-05-22

## 2025-06-17 RX ORDER — AMOXICILLIN 250 MG
2 CAPSULE ORAL 2 TIMES DAILY PRN
Status: DISCONTINUED | OUTPATIENT
Start: 2025-06-17 | End: 2025-06-18 | Stop reason: HOSPADM

## 2025-06-17 RX ORDER — BETAMETHASONE DIPROPIONATE 0.5 MG/G
LOTION TOPICAL 2 TIMES DAILY PRN
Status: DISCONTINUED | OUTPATIENT
Start: 2025-06-17 | End: 2025-06-18 | Stop reason: HOSPADM

## 2025-06-17 RX ORDER — ACETAMINOPHEN 325 MG/1
650 TABLET ORAL EVERY 4 HOURS PRN
Status: DISCONTINUED | OUTPATIENT
Start: 2025-06-17 | End: 2025-06-18 | Stop reason: HOSPADM

## 2025-06-17 RX ORDER — CALCIPOTRIENE 0.05 MG/ML
SOLUTION TOPICAL 2 TIMES DAILY
COMMUNITY
Start: 2025-05-19

## 2025-06-17 RX ORDER — BETAMETHASONE DIPROPIONATE 0.5 MG/G
LOTION TOPICAL 2 TIMES DAILY
COMMUNITY
Start: 2025-05-09

## 2025-06-17 RX ADMIN — TOPIRAMATE 200 MG: 50 TABLET, FILM COATED ORAL at 20:55

## 2025-06-17 RX ADMIN — GABAPENTIN 600 MG: 600 TABLET, FILM COATED ORAL at 20:54

## 2025-06-17 RX ADMIN — Medication 5 MG: at 22:56

## 2025-06-17 RX ADMIN — HEPARIN SODIUM 1000 UNITS/HR: 10000 INJECTION, SOLUTION INTRAVENOUS at 15:54

## 2025-06-17 RX ADMIN — HEPARIN SODIUM 1000 UNITS/HR: 10000 INJECTION, SOLUTION INTRAVENOUS at 17:30

## 2025-06-17 RX ADMIN — VORTIOXETINE 20 MG: 20 TABLET, FILM COATED ORAL at 20:55

## 2025-06-17 RX ADMIN — CALCIUM CARBONATE (ANTACID) CHEW TAB 500 MG 1000 MG: 500 CHEW TAB at 23:01

## 2025-06-17 ASSESSMENT — ACTIVITIES OF DAILY LIVING (ADL)
ADLS_ACUITY_SCORE: 41

## 2025-06-17 ASSESSMENT — COLUMBIA-SUICIDE SEVERITY RATING SCALE - C-SSRS
6. HAVE YOU EVER DONE ANYTHING, STARTED TO DO ANYTHING, OR PREPARED TO DO ANYTHING TO END YOUR LIFE?: NO
6. HAVE YOU EVER DONE ANYTHING, STARTED TO DO ANYTHING, OR PREPARED TO DO ANYTHING TO END YOUR LIFE?: NO
2. HAVE YOU ACTUALLY HAD ANY THOUGHTS OF KILLING YOURSELF IN THE PAST MONTH?: NO
1. IN THE PAST MONTH, HAVE YOU WISHED YOU WERE DEAD OR WISHED YOU COULD GO TO SLEEP AND NOT WAKE UP?: NO
1. IN THE PAST MONTH, HAVE YOU WISHED YOU WERE DEAD OR WISHED YOU COULD GO TO SLEEP AND NOT WAKE UP?: NO
2. HAVE YOU ACTUALLY HAD ANY THOUGHTS OF KILLING YOURSELF IN THE PAST MONTH?: NO

## 2025-06-17 NOTE — ED TRIAGE NOTES
Pt presents from [facility]  for eval of Chest pain that started while exercising today. Chest pain lasted 15 min. Pt given 325 ASA at clinic, pt was hypotensive for clinic staff, no nitroglycerin given at that time. BG WNL for EMS

## 2025-06-17 NOTE — ED PROVIDER NOTES
Emergency Department Note      History of Present Illness     Chief Complaint   Chest Pain      HPI   Vero Lua is a 59 year old female with a history of anxiety, depression, and hypothyroidism who presents to the ED for evaluation of chest pain. The patient reports an onset of right sided lateral chest pain beginning around 0930 today after a light pilates workout. She states that the pain radiated to the left side of her chest before settling centrally in her chest and flanks. The patient presented to Urgent Care where she was given aspirin and they noted she had some dyspnea, prompting her visit here to the ED. She notes that on her way to the ED her pain almost fully resolved, but that on the way to the bathroom she had another episode of bilateral upper-flank chest pain. The patient endorses aching chest pain and shortness of breath. She says that the pain was not burning or sharp, and that it did not radiate to her abdomen. Patient denies recent illness, fever, cough, chills, urinary or bowel symptoms, or active chest pain. She also denies recent surgeries, travel, or spouts of limited ambulation. Patient has no history of blood clots, and says this pain is new. She is a nonsmoker and has no history of elevated cholesterol.     Independent Historian   None    Review of External Notes       Past Medical History     Medical History and Problem List   Colon polyp  Anxiety  Insomnia  Depression  Dysesthesia  Fibromyalgia  Hypothyroidism  Interstitial cystitis  Migraines  Restless leg syndrome  Hemorrhoids    Medications   Wellbutrin XL  Neurontin  Synthroid/Levothroid  Claritin  Topamax  Diprosone  Lidex  Loniten  Lyrica  Dovonox  Avodart  Claritin  Maxalt  Trintellix    Surgical History   Colonoscopy  Laparoscopic cholecystectomy    Physical Exam     Patient Vitals for the past 24 hrs:   BP Temp Temp src Pulse Resp SpO2 Height Weight   06/17/25 1504 -- -- -- 51 13 100 % -- --   06/17/25 1437 121/70 -- --  "56 14 100 % -- --   06/17/25 1330 119/74 -- -- 57 10 100 % -- --   06/17/25 1100 (!) 143/83 98  F (36.7  C) Temporal 56 16 100 % 1.727 m (5' 8\") 83 kg (183 lb)     Physical Exam  General: Resting comfortably on the gurney  Head:  The scalp, face, and head appear normal  Eyes:  The pupils are equal, round, and reactive to light    There is no nystagmus    Extraocular muscles are intact    Conjunctivae and sclerae are normal  ENT:    The nose is normal    Pinnae are normal    The oropharynx is normal  Neck:  Normal range of motion    There is no rigidity noted  CV:  Normal rate  Normal rhythm     Normal S1/S2    No pathological murmur detected  Resp:  Lungs are clear    There is no tachypnea    Non-labored    No rales    No wheezing   GI:  Abdomen is soft, there is no rigidity    No distension    No rebound tenderness     Non-surgical without peritoneal features  MS:  Normal muscular tone    Symmetric motor strength  Skin:  No rash or acute skin lesions noted  Neuro:  Speech is normal and fluent, there is no aphasia    No motor deficits    Cranial nerves are intact  Psych: Awake. Alert.      Normal affect      Diagnostics     Lab Results   Labs Ordered and Resulted from Time of ED Arrival to Time of ED Departure   TROPONIN T, HIGH SENSITIVITY - Abnormal       Result Value    Troponin T, High Sensitivity 19 (*)    TROPONIN T, HIGH SENSITIVITY - Abnormal    Troponin T, High Sensitivity 42 (*)    BASIC METABOLIC PANEL - Normal    Sodium 141      Potassium 4.1      Chloride 105      Carbon Dioxide (CO2) 25      Anion Gap 11      Urea Nitrogen 12.8      Creatinine 0.91      GFR Estimate 72      Calcium 8.8      Glucose 93     TSH WITH FREE T4 REFLEX - Normal    TSH 2.18     D DIMER QUANTITATIVE - Normal    D-Dimer Quantitative 0.35     CBC WITH PLATELETS AND DIFFERENTIAL    WBC Count 7.1      RBC Count 4.42      Hemoglobin 13.4      Hematocrit 41.9      MCV 95      MCH 30.3      MCHC 32.0      RDW 13.4      Platelet Count " 176      % Neutrophils 74      % Lymphocytes 17      % Monocytes 7      % Eosinophils 2      % Basophils 0      % Immature Granulocytes 0      NRBCs per 100 WBC 0      Absolute Neutrophils 5.3      Absolute Lymphocytes 1.2      Absolute Monocytes 0.5      Absolute Eosinophils 0.2      Absolute Basophils 0.0      Absolute Immature Granulocytes 0.0      Absolute NRBCs 0.0     CBC WITH PLATELETS       Imaging   XR Chest 2 Views   Final Result   IMPRESSION: Negative chest. No infiltrate.          EKG   ECG taken at 1105, ECG read at 1300  Sinus bradycardia  Septal infarct, age undetermined  Abnormal ECG   No significant changes s compared to prior dated today.   Rate 58 bpm. WI interval 158 ms. QRS duration 70 ms. QT/QTc 392/384 ms. P-R-T axes 73 49 39.    Independent Interpretation   Chest x-ray shows no acute infiltrate    ED Course      Medications Administered   Medications   heparin loading dose for LOW INTENSITY TREATMENT * Give BEFORE starting heparin infusion (has no administration in time range)   heparin 25,000 units in 0.45% NaCl 250 mL ANTICOAGULANT infusion (has no administration in time range)       Procedures   Procedures     Discussion of Management   Admitting Hospitalist, Dr. Chairez    ED Course   ED Course as of 06/17/25 1441   e Jun 17, 2025   1316 I obtained history and examined the patient as noted above.        Additional Documentation  None    Medical Decision Making / Diagnosis     CMS Diagnoses: None    MIPS   None               MDM   Vero Lua is a 59 year old female presents to the emergency department with an episode of chest discomfort and shortness of breath that occurred this morning.  The patient's EKG is nonacute.  Initial troponin and second troponin were rising and elevated consistent with probable acute coronary syndrome/unstable angina.  NSTEMI is in the differential diagnosis.  The patient has a negative quantitative D-dimer, there is low likelihood of PE.  Chest x-ray  is negative.  The patient will be placed on heparin and admitted to the cardiology service/hospitalist for further management and workup.    Disposition   The patient was admitted to the hospital.     Diagnosis     ICD-10-CM    1. ACS (acute coronary syndrome) (H)  I24.9            Discharge Medications   New Prescriptions    No medications on file         Scribe Disclosure:  Ayde NYE, am serving as a scribe at 1:10 PM on 6/17/2025 to document services personally performed by Jose Alberto Perez MD based on my observations and the provider's statements to me.        Jose Alberto Perez MD  06/17/25 1525

## 2025-06-17 NOTE — PROGRESS NOTES
RECEIVING UNIT ED HANDOFF REVIEW    ED Nurse Handoff Report was reviewed by: Lana Cordon RN on June 17, 2025 at 4:34 PM

## 2025-06-17 NOTE — ED TRIAGE NOTES
Chest pain started at 0930 this morning during exercise. Pain comes and goes 4 to 8/10. Shortness of breath also reported after the chest pain started. Pt went to  and they sent here here by EMS. ASA given by .     Triage Assessment (Adult)       Row Name 06/17/25 1101          Triage Assessment    Airway WDL WDL        Respiratory WDL    Respiratory WDL WDL        Skin Circulation/Temperature WDL    Skin Circulation/Temperature WDL WDL        Cardiac WDL    Cardiac WDL X;chest pain        Peripheral/Neurovascular WDL    Peripheral Neurovascular WDL WDL        Cognitive/Neuro/Behavioral WDL    Cognitive/Neuro/Behavioral WDL WDL

## 2025-06-17 NOTE — PHARMACY-ADMISSION MEDICATION HISTORY
Pharmacist Admission Medication History    Admission medication history is complete. The information provided in this note is only as accurate as the sources available at the time of the update.    Information Source(s): Patient, Clinic records, and CareEverywhere/SureScripts via in-person    Pertinent Information: none    Changes made to PTA medication list:  Added: all topicals, rizatriptan, nemluvio, vit d, dutasteride, aleve  Deleted: None  Changed: brintellix 10mg to 20mg, claritin to scheduled, wellbutrin 150mg to 300mg, gabapentin tid to at bedtime, botox to [id number]    Allergies reviewed with patient and updates made in EHR: yes    Medication History Completed By: Kasey Mendoza RPH 6/17/2025 3:12 PM    PTA Med List   Medication Sig Last Dose/Taking    betamethasone dipropionate (DIPROSONE) 0.05 % external lotion Apply topically 2 times daily. APPLY TO AFFECTED AREAS OF A SCALP TWICE A DAY FOR FLARE OF ITCH AND SCALE AS NEEDED. 6/16/2025    BOTOX 200 units injection once every twelve weeks. For migraines Unknown    buPROPion (WELLBUTRIN XL) 300 MG 24 hr tablet Take 300 mg by mouth every morning. 6/16/2025 Morning    calcipotriene (DOVONOX) 0.005 % external solution Apply topically 2 times daily. 6/16/2025    ciclopirox 1 % SHAM three times a week. APPLY TO SCALP AND THEN WASH OFF UP TO THREE TIMES A WEEK AS TOLERATED Past Week    dutasteride (AVODART) 0.5 MG capsule Take 0.5 mg by mouth every morning. 6/16/2025 Morning    ergocalciferol (ERGOCALCIFEROL) 1.25 MG (31445 UT) capsule Take 50,000 Units by mouth once a week. Takes on sundays 6/15/2025    gabapentin (NEURONTIN) 600 MG tablet Take 1 tablet (600 mg) by mouth 3 times daily. (Patient taking differently: Take 600 mg by mouth at bedtime.) 6/16/2025 Evening    levothyroxine (SYNTHROID/LEVOTHROID) 100 MCG tablet Take 1 tablet (100 mcg) by mouth daily. 6/16/2025 Morning    loratadine (CLARITIN) 10 MG tablet Take 10 mg by mouth daily. 6/16/2025 Morning     naproxen sodium (ANAPROX) 220 MG tablet Take 440 mg by mouth 2 times daily as needed for moderate pain. 6/17/2025 Morning    NEMLUVIO 30 MG AUIJ 30 mg every 28 days. 6/16/2025    rizatriptan (MAXALT) 10 MG tablet Take 10 mg by mouth at onset of headache for migraine. TAKE 1 TABLET (10 MG) BY ORAL ROUTE ONCE, MAY REPEAT AT 2 HOUR INTERVALS DO NOT EXCEED 30 MG IN 24 HOURS. Unknown    topiramate (TOPAMAX) 200 MG tablet Take 200 mg by mouth At Bedtime  6/16/2025 Bedtime    TRINTELLIX 20 MG tablet Take 20 mg by mouth at bedtime. 6/16/2025 Bedtime    ZORYVE 0.3 % FOAM Apply topically daily. To scalp 6/16/2025

## 2025-06-17 NOTE — H&P
"[facility]    History and Physical - Hospitalist Service       Date of Admission:  6/17/2025    Assessment & Plan      Vero Lua is a 59 year old postmenopausal female with past medical history of migraines, dysesthesias, erythromelalgia, depression/anxiety, chronic insomnia, hypothyroidism, restless leg syndrome, cholecystitis s/p cholecystectomy, among others, who was admitted on 06/17/2025 for further evaluation of chest pain.     Patient presented hemodynamically stable although bradycardic (HR 51). Afebrile. She reports developing chest pain at approximately 0930 while she was in a piliates class. Reports the pain initially felt bilateral breasts (lateral aspect chest wall close to midaxillary line) and wrapped anteriorly across chest wall with some radiation to bilateral upper extremities, as well. She describes pain as a tightness that transitioned to an ache that initially waxed and waned in severity. She states upon symptom presentation pain was approximately 4/10 in severity before reaching 10/10 in severity while at . She notes she experienced significant SOB with symptoms secondary to her chest wall tightness and a subsequent sensation of chest and airway \"restriction.\" Symptoms spontaneously resolved en route before returning upon ambulating to restroom after arriving to [facility] ED. She states her pain was mild and resolved shortly after returning to rest. She is currently chest pain free and denying shortness of breath. There was no post-prandial component or reproducible chest wall tenderness, per patient. Patient denies associated symptoms of pleuritic chest pain, palpitations, diaphoresis, URI symptoms, back/flank pain, abdominal pain, N/V, LE edema or calf tenderness, urinary or bowel changes. No recent illnesses. No recent travel or surgeries. Patient is a never smoker. She denies history of blood clots. No history of DM. Family history notable only for " "grandfather who was suspected to have  from MI in 80s. She otherwise denies family history of CAD. Last oral intake 1200 upon arrival to ED. Labs in ED notable for no leukocytosis, anemia or electrolyte derangement. TSH 2.18. D-Dimer unremarkable (0.35). EKG sinus bradycardia without evidence of acute ischemic changes. Initial troponin 19. Repeat 42. TSH 2.18. CXR negative. She was started on heparin infusion in ED.     Suspect NSTEMI  ACS   - Vitals q 4 hours.   - Monitor on telemetry.  - Continuous pulse oximetry. Oxygen PRN for SpO2 > 91%.   - Cardiology consult requested, appreciate the cares.   - Echocardiogram ordered and pending.    - Continue trending troponin.    - Continue heparin infusion.  - Nitroglycerin and morphine PRN for recurrence of chest pain.    - Check fasting lipid panel AM.    - Repeat A1c pending.   - Continue to monitor with morning labs.     Hypothyroidism   *TSH 2.18 upon admission. Controlled.   - Continue PTA levothyroxine.     History of dysesthesias   History of erythromelalgias   History of RLS   *Follows with Neurology for history of neurodermatitis, intractable chronic migraine without aura and without status migrainosus, dysesthesias and erythromelalgias. On PTA botox injections, gabapentin and trintillix.   - Continue PTA gabapentin and trintillix.    History of neurodermatitis   *History of significant scalp neurodermatitis where patient reports a \"burning sensation\" of her scalp. On PTA gabapentin, dovonox, zoryve, diprasone and ciclopirox.   - Continue PTA gabapentin, dovonox, zoryve and diprasone.     History of intractable chronic migraine without aura and without status migrainosus  *Follows with Neurology, per above. Receives botox injection of 200 units in 12 weeks. Also on PTA topiramate and rizatriptan PRN. On PTA dutasteride secondary to hair loss. No headache noted upon admission.   - Continue PTA topiramate and rizatriptan.    Depression   Anxiety   Depression " "and anxiety have been well controlled recently.   - Continue PTA wellbutrin and trintillix.   - Continue to follow-up outpatient for continued management.     Insomnia   - Continue PTA gabapentin and topiramate.     Code Status   - Discussed directly with patient who elects FULL CODE.         Diet: NPO for Medical/Clinical Reasons Except for: Meds  Combination Diet Low Saturated Fat Na <2400mg Diet, No Caffeine Diet  DVT Prophylaxis: Pneumatic Compression Devices  Diallo Catheter: Not present  Lines: None     Cardiac Monitoring: ACTIVE order. Indication: AMI (NSTEMI/ STEMI) (48 hours)  Code Status: Full Code    Clinically Significant Risk Factors Present on Admission                             # Overweight: Estimated body mass index is 27.83 kg/m  as calculated from the following:    Height as of this encounter: 1.727 m (5' 8\").    Weight as of this encounter: 83 kg (183 lb).              Disposition Plan     Medically Ready for Discharge: Anticipated in 2-4 Days         The patient's care was discussed with the Attending Physician, Dr. Chairez.    Tamra Quintana PA-C  Hospitalist Service  [facility]  Securely message with [employer] (more info)  Text page via [facility] Paging/Directory     ______________________________________________________________________    Chief Complaint   Chest pain     History is obtained from the patient, electronic health record, and emergency department physician    History of Present Illness   Vero Lua is a 59 year old postmenopausal female with past medical history of migraines, dysesthesias, erythromelalgia, depression/anxiety, chronic insomnia, hypothyroidism, restless leg syndrome, cholecystitis s/p cholecystectomy, among others, who was admitted on 06/17/2025 for further evaluation of chest pain.     Patient presented hemodynamically stable although bradycardic (HR 51). Afebrile. She reports developing chest pain at approximately 0930 while " "she was in a piliates class. Reports the pain initially felt bilateral breasts (lateral aspect chest wall close to midaxillary line) and wrapped anteriorly across chest wall with some radiation to bilateral upper extremities, as well. She describes pain as a tightness that transitioned to an ache that initially waxed and waned in severity. She states upon symptom presentation pain was approximately 4/10 in severity before reaching 10/10 in severity while at . She notes she experienced significant SOB with symptoms secondary to her chest wall tightness and a subsequent sensation of chest and airway \"restriction.\" Symptoms spontaneously resolved en route before returning upon ambulating to restroom after arriving to [facility] ED. She states her pain was mild and resolved shortly after returning to rest. She is currently chest pain free and denying shortness of breath. There was no post-prandial component or reproducible chest wall tenderness, per patient. Patient denies associated symptoms of pleuritic chest pain, palpitations, diaphoresis, URI symptoms, back/flank pain, abdominal pain, N/V, LE edema or calf tenderness, urinary or bowel changes. No recent illnesses. No recent travel or surgeries. Patient is a never smoker. She denies history of blood clots. No history of DM. Family history notable only for grandfather who was suspected to have  from MI in 80s. She otherwise denies family history of CAD. Last oral intake 1200 upon arrival to ED. Labs in ED notable for no leukocytosis, anemia or electrolyte derangement. TSH 2.18. D-Dimer unremarkable (0.35). EKG sinus bradycardia without evidence of acute ischemic changes. Initial troponin 19. Repeat 42. TSH 2.18. CXR negative. She was started on heparin infusion in ED.       Past Medical History    Past Medical History:   Diagnosis Date    Adenomatous polyp of colon 2016    fu 5 years, [place] gi; fu done  and nl    Anxiety     Chronic insomnia     on ambien " and klonopin for over 10 years together    Depression, major, in partial remission     was seeing psyche until , not since then, as of  seeing Dr. Bryant Flower    Dilated bile duct     extrahepatic, seen on ct for ruq pain, then eus done and no stricture seen    Dysesthesia     of feet, seeing Dr. Ko of neuro    Fibromyalgia     Hypothyroid     Interstitial cystitis     urol [facility] and had interstem    Migraines     Dr. Cash of neuro, got botox  via [facility] Dr. Kevin, now Dr. Watkins, then Dr. Ko    Restless leg syndrome     Urgency of urination     interstim       Past Surgical History   Past Surgical History:   Procedure Laterality Date    COLONOSCOPY      ENDOSCOPIC ULTRASOUND UPPER GASTROINTESTINAL TRACT (GI) N/A 2020    Procedure: DIAGNOSTIC ENDOSCOPIC ULTRASOUND, ESOPHAGOSCOPY / UPPER GASTROINTESTINAL TRACT;  Surgeon: Juan Hurst MD;  Location:  OR    interstim procedure  ,  and     [facility]    LAPAROSCOPIC CHOLECYSTECTOMY N/A 3/24/2021    Procedure: CHOLECYSTECTOMY, LAPAROSCOPIC;  Surgeon: William Leslie MD;  Location: [facility] OR       Prior to Admission Medications   Prior to Admission Medications   Prescriptions Last Dose Informant Patient Reported? Taking?   BOTOX 200 units injection Unknown  Yes Yes   Sig: once every twelve weeks. For migraines   NEMLUVIO 30 MG AUIJ 2025  Yes Yes   Si mg every 28 days.   TRINTELLIX 20 MG tablet 2025 Bedtime  Yes Yes   Sig: Take 20 mg by mouth at bedtime.   ZORYVE 0.3 % FOAM 2025  Yes Yes   Sig: Apply topically daily. To scalp   betamethasone dipropionate (DIPROSONE) 0.05 % external lotion 2025  Yes Yes   Sig: Apply topically 2 times daily. APPLY TO AFFECTED AREAS OF A SCALP TWICE A DAY FOR FLARE OF ITCH AND SCALE AS NEEDED.   buPROPion (WELLBUTRIN XL) 300 MG 24 hr tablet 2025 Morning  Yes Yes   Sig: Take 300 mg by mouth every morning.   calcipotriene (DOVONOX) 0.005  % external solution 6/16/2025  Yes Yes   Sig: Apply topically 2 times daily.   ciclopirox 1 % SHAM Past Week  Yes Yes   Sig: three times a week. APPLY TO SCALP AND THEN WASH OFF UP TO THREE TIMES A WEEK AS TOLERATED   dutasteride (AVODART) 0.5 MG capsule 6/16/2025 Morning  Yes Yes   Sig: Take 0.5 mg by mouth every morning.   ergocalciferol (ERGOCALCIFEROL) 1.25 MG (63849 UT) capsule 6/15/2025  Yes Yes   Sig: Take 50,000 Units by mouth once a week. Takes on sundays   gabapentin (NEURONTIN) 600 MG tablet 6/16/2025 Evening  No Yes   Sig: Take 1 tablet (600 mg) by mouth 3 times daily.   Patient taking differently: Take 600 mg by mouth at bedtime.   levothyroxine (SYNTHROID/LEVOTHROID) 100 MCG tablet 6/16/2025 Morning  No Yes   Sig: Take 1 tablet (100 mcg) by mouth daily.   loratadine (CLARITIN) 10 MG tablet 6/16/2025 Morning  Yes Yes   Sig: Take 10 mg by mouth daily.   naproxen sodium (ANAPROX) 220 MG tablet 6/17/2025 Morning  Yes Yes   Sig: Take 440 mg by mouth 2 times daily as needed for moderate pain.   rizatriptan (MAXALT) 10 MG tablet Unknown  Yes Yes   Sig: Take 10 mg by mouth at onset of headache for migraine. TAKE 1 TABLET (10 MG) BY ORAL ROUTE ONCE, MAY REPEAT AT 2 HOUR INTERVALS DO NOT EXCEED 30 MG IN 24 HOURS.   topiramate (TOPAMAX) 200 MG tablet 6/16/2025 Bedtime  Yes Yes   Sig: Take 200 mg by mouth At Bedtime       Facility-Administered Medications: None        Social History   I have reviewed this patient's social history and updated it with pertinent information if needed.  Social History     Tobacco Use    Smoking status: Never    Smokeless tobacco: Never   Vaping Use    Vaping status: Never Used   Substance Use Topics    Alcohol use: No     Alcohol/week: 0.0 standard drinks of alcohol    Drug use: No         Family History   I have reviewed this patient's family history and updated it with pertinent information if needed.  Family History   Problem Relation Age of Onset    Pancreatic Cancer Mother      Depression Sister     Depression Father     Lymphoma Father     No Known Problems Brother     Osteoporosis Maternal Grandmother     Deep Vein Thrombosis No family hx of     Anesthesia Reaction No family hx of          Allergies   Allergies   Allergen Reactions    Adhesive Tape Rash     Some tapes and surgical glue        Physical Exam   Vital Signs: Temp: 97.5  F (36.4  C) Temp src: Oral BP: 138/80 Pulse: 55   Resp: 14 SpO2: 100 % O2 Device: None (Room air)    Weight: 183 lbs 0 oz  GENERAL:  Pleasant, cooperative, alert. Sitting in bed in no acute distress. Nontoxic appearing.   HEENT: Normocephalic, atraumatic.  Extra occular mm intact.  Sclera clear. PERRL.  Mucous membranes moist.  No erythema; uvula midline.  Neck supple with no adenopathy.   PULMONOLOGY: Clear to auscultation bilaterally.  CARDIAC: Regular rate and rhythm. No reproducible chest wall tenderness on examination.   ABDOMEN: Soft, non distended. Mild epigastric tenderness with palpation. BS+.   MUSCULOSKELETAL:  Moving x 4 spontaneously with CMS intact x4.  Normal bulk and tone.  No LE edema.  Radial pulses 2+ bilaterally.    NEURO: Alert and oriented x3.  Nonfocal exam.    Medical Decision Making       65 MINUTES SPENT BY ME on the date of service doing chart review, history, exam, documentation & further activities per the note.      Data   ------------------------- PAST 24 HR DATA REVIEWED -----------------------------------------------    I have personally reviewed the following data over the past 24 hrs:    6.4  \   12.8   / 160     141 105 12.8 /  93   4.1 25 0.91 \     Trop: 42 (H) BNP: N/A     TSH: 2.18 T4: N/A A1C: 5.0     INR:  N/A PTT:  N/A   D-dimer:  0.35 Fibrinogen:  N/A       Imaging results reviewed over the past 24 hrs:   Recent Results (from the past 24 hours)   XR Chest 2 Views    Narrative    EXAM: XR CHEST 2 VIEWS  LOCATION: [facility]  DATE: 6/17/2025    INDICATION: Chest pain, infiltrate  COMPARISON:  None.      Impression    IMPRESSION: Negative chest.

## 2025-06-17 NOTE — ED NOTES
[facility]  ED Nurse Handoff Report    ED Chief complaint: Chest Pain      ED Diagnosis:   Final diagnoses:   ACS (acute coronary syndrome) (H)       Code Status: TBD    Allergies:   Allergies   Allergen Reactions    Adhesive Tape Rash     Some tapes and surgical glue       Patient Story: Chest pain started at 0930 this morning during exercise. Pain comes and goes 4 to 8/10. Shortness of breath also reported after the chest pain started. Pt went to  and they sent here here by EMS. ASA given by .   Focused Assessment:  troponin has increased since being here.   Labs Ordered and Resulted from Time of ED Arrival to Time of ED Departure   TROPONIN T, HIGH SENSITIVITY - Abnormal       Result Value    Troponin T, High Sensitivity 19 (*)    TROPONIN T, HIGH SENSITIVITY - Abnormal    Troponin T, High Sensitivity 42 (*)    BASIC METABOLIC PANEL - Normal    Sodium 141      Potassium 4.1      Chloride 105      Carbon Dioxide (CO2) 25      Anion Gap 11      Urea Nitrogen 12.8      Creatinine 0.91      GFR Estimate 72      Calcium 8.8      Glucose 93     TSH WITH FREE T4 REFLEX - Normal    TSH 2.18     D DIMER QUANTITATIVE - Normal    D-Dimer Quantitative 0.35     CBC WITH PLATELETS AND DIFFERENTIAL    WBC Count 7.1      RBC Count 4.42      Hemoglobin 13.4      Hematocrit 41.9      MCV 95      MCH 30.3      MCHC 32.0      RDW 13.4      Platelet Count 176      % Neutrophils 74      % Lymphocytes 17      % Monocytes 7      % Eosinophils 2      % Basophils 0      % Immature Granulocytes 0      NRBCs per 100 WBC 0      Absolute Neutrophils 5.3      Absolute Lymphocytes 1.2      Absolute Monocytes 0.5      Absolute Eosinophils 0.2      Absolute Basophils 0.0      Absolute Immature Granulocytes 0.0      Absolute NRBCs 0.0     CBC WITH PLATELETS        Treatments and/or interventions provided: heparin has been started.  Patient's response to treatments and/or interventions: tolerating    To be done/followed up on  "inpatient unit:  cardiology consult    Does this patient have any cognitive concerns?: none    Activity level - Baseline/Home:  Independent  Activity Level - Current:   Stand with Assist    Patient's Preferred language: English   Needed?: No    Isolation: None  Infection: Not Applicable  Sepsis treatment initiated: No  Patient tested for COVID 19 prior to admission: NO  Bariatric?: No    Vital Signs:   Vitals:    06/17/25 1100 06/17/25 1330 06/17/25 1437 06/17/25 1504   BP: (!) 143/83 119/74 121/70    Pulse: 56 57 56 51   Resp: 16 10 14 13   Temp: 98  F (36.7  C)      TempSrc: Temporal      SpO2: 100% 100% 100% 100%   Weight: 83 kg (183 lb)      Height: 1.727 m (5' 8\")          Cardiac Rhythm:     Was the PSS-3 completed:   Yes  What interventions are required if any?  none        Family Comments:  was at bedside    For the majority of the shift this patient's behavior was Green.   Behavioral interventions performed were none.    ED NURSE PHONE NUMBER: [id number]         "

## 2025-06-18 ENCOUNTER — APPOINTMENT (OUTPATIENT)
Dept: CARDIOLOGY | Facility: CLINIC | Age: 60
End: 2025-06-18
Payer: COMMERCIAL

## 2025-06-18 ENCOUNTER — APPOINTMENT (OUTPATIENT)
Dept: CARDIOLOGY | Facility: CLINIC | Age: 60
End: 2025-06-18
Attending: INTERNAL MEDICINE
Payer: COMMERCIAL

## 2025-06-18 VITALS
RESPIRATION RATE: 18 BRPM | DIASTOLIC BLOOD PRESSURE: 79 MMHG | HEART RATE: 76 BPM | SYSTOLIC BLOOD PRESSURE: 107 MMHG | TEMPERATURE: 98 F | OXYGEN SATURATION: 97 % | WEIGHT: 182.3 LBS | BODY MASS INDEX: 27.63 KG/M2 | HEIGHT: 68 IN

## 2025-06-18 PROBLEM — R07.89 NON-CARDIAC CHEST PAIN: Status: ACTIVE | Noted: 2025-06-18

## 2025-06-18 LAB
ALBUMIN SERPL BCG-MCNC: 3.9 G/DL (ref 3.5–5.2)
ALP SERPL-CCNC: 83 U/L (ref 40–150)
ALT SERPL W P-5'-P-CCNC: 22 U/L (ref 0–50)
ANION GAP SERPL CALCULATED.3IONS-SCNC: 10 MMOL/L (ref 7–15)
AST SERPL W P-5'-P-CCNC: 21 U/L (ref 0–45)
BILIRUB DIRECT SERPL-MCNC: 0.17 MG/DL (ref 0–0.3)
BILIRUB SERPL-MCNC: 0.5 MG/DL
BUN SERPL-MCNC: 11.6 MG/DL (ref 8–23)
CALCIUM SERPL-MCNC: 9.2 MG/DL (ref 8.8–10.4)
CHLORIDE SERPL-SCNC: 108 MMOL/L (ref 98–107)
CHOLEST SERPL-MCNC: 158 MG/DL
CREAT SERPL-MCNC: 0.87 MG/DL (ref 0.51–0.95)
EGFRCR SERPLBLD CKD-EPI 2021: 76 ML/MIN/1.73M2
ERYTHROCYTE [DISTWIDTH] IN BLOOD BY AUTOMATED COUNT: 13.6 % (ref 10–15)
GLUCOSE SERPL-MCNC: 94 MG/DL (ref 70–99)
HCO3 SERPL-SCNC: 25 MMOL/L (ref 22–29)
HCT VFR BLD AUTO: 40.4 % (ref 35–47)
HDLC SERPL-MCNC: 75 MG/DL
HGB BLD-MCNC: 13 G/DL (ref 11.7–15.7)
LDLC SERPL CALC-MCNC: 67 MG/DL
LVEF ECHO: NORMAL
MCH RBC QN AUTO: 30.3 PG (ref 26.5–33)
MCHC RBC AUTO-ENTMCNC: 32.2 G/DL (ref 31.5–36.5)
MCV RBC AUTO: 94 FL (ref 78–100)
NONHDLC SERPL-MCNC: 83 MG/DL
PLATELET # BLD AUTO: 168 10E3/UL (ref 150–450)
POTASSIUM SERPL-SCNC: 3.7 MMOL/L (ref 3.4–5.3)
PROT SERPL-MCNC: 6.2 G/DL (ref 6.4–8.3)
RBC # BLD AUTO: 4.29 10E6/UL (ref 3.8–5.2)
SODIUM SERPL-SCNC: 143 MMOL/L (ref 135–145)
TRIGL SERPL-MCNC: 78 MG/DL
UFH PPP CHRO-ACNC: 0.36 IU/ML (ref ?–1.1)
WBC # BLD AUTO: 5.7 10E3/UL (ref 4–11)

## 2025-06-18 PROCEDURE — 36415 COLL VENOUS BLD VENIPUNCTURE: CPT

## 2025-06-18 PROCEDURE — 93017 CV STRESS TEST TRACING ONLY: CPT

## 2025-06-18 PROCEDURE — 84155 ASSAY OF PROTEIN SERUM: CPT | Performed by: INTERNAL MEDICINE

## 2025-06-18 PROCEDURE — 99239 HOSP IP/OBS DSCHRG MGMT >30: CPT | Performed by: INTERNAL MEDICINE

## 2025-06-18 PROCEDURE — 85520 HEPARIN ASSAY: CPT | Performed by: HOSPITALIST

## 2025-06-18 PROCEDURE — 85027 COMPLETE CBC AUTOMATED: CPT

## 2025-06-18 PROCEDURE — 93016 CV STRESS TEST SUPVJ ONLY: CPT | Performed by: INTERNAL MEDICINE

## 2025-06-18 PROCEDURE — 93018 CV STRESS TEST I&R ONLY: CPT | Performed by: INTERNAL MEDICINE

## 2025-06-18 PROCEDURE — 250N000013 HC RX MED GY IP 250 OP 250 PS 637

## 2025-06-18 PROCEDURE — 93306 TTE W/DOPPLER COMPLETE: CPT | Mod: 26 | Performed by: INTERNAL MEDICINE

## 2025-06-18 PROCEDURE — 255N000002 HC RX 255 OP 636: Mod: JZ

## 2025-06-18 PROCEDURE — 80061 LIPID PANEL: CPT

## 2025-06-18 PROCEDURE — 99205 OFFICE O/P NEW HI 60 MIN: CPT | Mod: 25 | Performed by: INTERNAL MEDICINE

## 2025-06-18 PROCEDURE — 999N000208 ECHOCARDIOGRAM COMPLETE

## 2025-06-18 PROCEDURE — 80048 BASIC METABOLIC PNL TOTAL CA: CPT

## 2025-06-18 RX ORDER — ATORVASTATIN CALCIUM 40 MG/1
40 TABLET, FILM COATED ORAL EVERY EVENING
Status: DISCONTINUED | OUTPATIENT
Start: 2025-06-18 | End: 2025-06-18 | Stop reason: HOSPADM

## 2025-06-18 RX ADMIN — BUPROPION HYDROCHLORIDE 300 MG: 150 TABLET, EXTENDED RELEASE ORAL at 09:45

## 2025-06-18 RX ADMIN — LEVOTHYROXINE SODIUM 100 MCG: 100 TABLET ORAL at 09:45

## 2025-06-18 RX ADMIN — PERFLUTREN 10 ML (DILUTED): 6.52 INJECTION, SUSPENSION INTRAVENOUS at 09:21

## 2025-06-18 RX ADMIN — LORATADINE 10 MG: 10 TABLET ORAL at 09:45

## 2025-06-18 ASSESSMENT — ACTIVITIES OF DAILY LIVING (ADL)
ADLS_ACUITY_SCORE: 41
ADLS_ACUITY_SCORE: 18
ADLS_ACUITY_SCORE: 41
ADLS_ACUITY_SCORE: 41
ADLS_ACUITY_SCORE: 18
ADLS_ACUITY_SCORE: 41
ADLS_ACUITY_SCORE: 18
ADLS_ACUITY_SCORE: 41
ADLS_ACUITY_SCORE: 18

## 2025-06-18 NOTE — PLAN OF CARE
Goal Outcome Evaluation:      Plan of Care Reviewed With: patient, spouse      Discharge home with  up in the room independent, VSS, RA. Denies chest pain.

## 2025-06-18 NOTE — CONSULTS
Inpatient Cardiology Consultation.   [facility]  Date of Admission: 6/17/2025  Date of Consult: June 18, 2025      DIAGNOSES/ASSESSMENT:    Non-cardiac chest, upper abdominal discomfort - likely musculoskeletal from pilates workout.  Minimal troponin elevation - not in an injury pattern.  Normal resting EKG.  Normal echocardiogram.  Normal treadmill stress test.  No significant cardiovascular risk factors.  Never tobacco user.    PLAN:    Reviewed all cardiac testing with patient and reassured her.  No other inpatient cardiac testing needed.  Routine follow-up in cardiology clinic in 4 weeks with RAHUL.  I directly discussed with the patient's RN and hospitalist Dr. Lito Poe.  Cardiology will sign off.      Antonio Villareal MD, MD [facility]  Cardiology    Total time today 65 minutes.      CODE STATUS:  Full Code      REASON FOR CONSULT:  Evaluation of chest pain following a Pilates workout.    HISTORY OF PRESENT ILLNESS:  Vero Lua is a 59 year old female with a history of anxiety, depression, and hypothyroidism. She presented to urgent care and was subsequently referred to the emergency room for evaluation of chest pain.     The patient has no known history of coronary or other cardiovascular disease. She developed right-sided chest pain after a light Pilates workout, which radiated to the left side of her chest and was associated with discomfort in her flanks.    EKG showed normal sinus rhythm without any acute ischemic changes. High sensitivity troponin T was minimally elevated in a non-myocardial injury pattern with values of 19-42-34-25. Other lab results were unremarkable, including a normal renal panel, creatinine of 0.87, normal liver enzymes, and HbA1c of 5%. Her lipid profile showed a total cholesterol of 191, HDL of 70, LDL of 96, and triglycerides of 126. CBC was normal with a hemoglobin of 13.0.     I reviewed her echocardiogram.  Normal left ventricular size and  systolic function, LVEF 60%, no wall motion abnormalities, normal right ventricle, no valve disease.    Treadmill stress test today was negative for ischemia at 7.0 METS with no arrhythmias.    Chest X-ray was normal.    Social History: The patient is a never tobacco user.    Family History: There is no family history of coronary artery disease.    Physical Exam: Cardiovascular examination was normal with regular heart sounds, no murmur, no carotid bruit. The patient was normotensive at 117/67, with a pulse of 60 and oxygen saturation of 98% on room air. Jugular venous pressure was normal. There was no lower extremity edema. Lungs were clear with no pleural or pericardial friction rub.    REVIEW OF SYSTEMS:  A comprehensive 10 point review of systems was completed and the pertinent positives are documented in history of present illness.    FAMILY HISTORY:  Family History   Problem Relation Age of Onset    Pancreatic Cancer Mother     Depression Father     Lymphoma Father     Depression Sister     No Known Problems Brother     Osteoporosis Maternal Grandmother     Deep Vein Thrombosis No family hx of     Anesthesia Reaction No family hx of     Coronary Artery Disease Early Onset No family hx of        MEDICATIONS:  Prior to Admission Medications   Prescriptions Last Dose Informant Patient Reported? Taking?   BOTOX 200 units injection Unknown  Yes Yes   Sig: once every twelve weeks. For migraines   NEMLUVIO 30 MG AUIJ 2025  Yes Yes   Si mg every 28 days.   TRINTELLIX 20 MG tablet 2025 Bedtime  Yes Yes   Sig: Take 20 mg by mouth at bedtime.   ZORYVE 0.3 % FOAM 2025  Yes Yes   Sig: Apply topically daily. To scalp   betamethasone dipropionate (DIPROSONE) 0.05 % external lotion 2025  Yes Yes   Sig: Apply topically 2 times daily. APPLY TO AFFECTED AREAS OF A SCALP TWICE A DAY FOR FLARE OF ITCH AND SCALE AS NEEDED.   buPROPion (WELLBUTRIN XL) 300 MG 24 hr tablet 2025 Morning  Yes Yes   Sig:  Take 300 mg by mouth every morning.   calcipotriene (DOVONOX) 0.005 % external solution 2025  Yes Yes   Sig: Apply topically 2 times daily.   ciclopirox 1 % SHAM Past Week  Yes Yes   Sig: three times a week. APPLY TO SCALP AND THEN WASH OFF UP TO THREE TIMES A WEEK AS TOLERATED   dutasteride (AVODART) 0.5 MG capsule 2025 Morning  Yes Yes   Sig: Take 0.5 mg by mouth every morning.   ergocalciferol (ERGOCALCIFEROL) 1.25 MG (23016 UT) capsule 6/15/2025  Yes Yes   Sig: Take 50,000 Units by mouth once a week. Takes on sundays   gabapentin (NEURONTIN) 600 MG tablet 2025 Evening  No Yes   Sig: Take 1 tablet (600 mg) by mouth 3 times daily.   Patient taking differently: Take 600 mg by mouth at bedtime.   levothyroxine (SYNTHROID/LEVOTHROID) 100 MCG tablet 2025 Morning  No Yes   Sig: Take 1 tablet (100 mcg) by mouth daily.   loratadine (CLARITIN) 10 MG tablet 2025 Morning  Yes Yes   Sig: Take 10 mg by mouth daily.   naproxen sodium (ANAPROX) 220 MG tablet 2025 Morning  Yes Yes   Sig: Take 440 mg by mouth 2 times daily as needed for moderate pain.   rizatriptan (MAXALT) 10 MG tablet Unknown  Yes Yes   Sig: Take 10 mg by mouth at onset of headache for migraine. TAKE 1 TABLET (10 MG) BY ORAL ROUTE ONCE, MAY REPEAT AT 2 HOUR INTERVALS DO NOT EXCEED 30 MG IN 24 HOURS.   topiramate (TOPAMAX) 200 MG tablet 2025 Bedtime  Yes Yes   Sig: Take 200 mg by mouth At Bedtime       Facility-Administered Medications: None       HOME MEDICATIONS:  Prior to Admission Medications   Prescriptions Last Dose Informant Patient Reported? Taking?   BOTOX 200 units injection Unknown  Yes Yes   Sig: once every twelve weeks. For migraines   NEMLUVIO 30 MG AUIJ 2025  Yes Yes   Si mg every 28 days.   TRINTELLIX 20 MG tablet 2025 Bedtime  Yes Yes   Sig: Take 20 mg by mouth at bedtime.   ZORYVE 0.3 % FOAM 2025  Yes Yes   Sig: Apply topically daily. To scalp   betamethasone dipropionate (DIPROSONE) 0.05 %  external lotion 6/16/2025  Yes Yes   Sig: Apply topically 2 times daily. APPLY TO AFFECTED AREAS OF A SCALP TWICE A DAY FOR FLARE OF ITCH AND SCALE AS NEEDED.   buPROPion (WELLBUTRIN XL) 300 MG 24 hr tablet 6/16/2025 Morning  Yes Yes   Sig: Take 300 mg by mouth every morning.   calcipotriene (DOVONOX) 0.005 % external solution 6/16/2025  Yes Yes   Sig: Apply topically 2 times daily.   ciclopirox 1 % SHAM Past Week  Yes Yes   Sig: three times a week. APPLY TO SCALP AND THEN WASH OFF UP TO THREE TIMES A WEEK AS TOLERATED   dutasteride (AVODART) 0.5 MG capsule 6/16/2025 Morning  Yes Yes   Sig: Take 0.5 mg by mouth every morning.   ergocalciferol (ERGOCALCIFEROL) 1.25 MG (15570 UT) capsule 6/15/2025  Yes Yes   Sig: Take 50,000 Units by mouth once a week. Takes on sundays   gabapentin (NEURONTIN) 600 MG tablet 6/16/2025 Evening  No Yes   Sig: Take 1 tablet (600 mg) by mouth 3 times daily.   Patient taking differently: Take 600 mg by mouth at bedtime.   levothyroxine (SYNTHROID/LEVOTHROID) 100 MCG tablet 6/16/2025 Morning  No Yes   Sig: Take 1 tablet (100 mcg) by mouth daily.   loratadine (CLARITIN) 10 MG tablet 6/16/2025 Morning  Yes Yes   Sig: Take 10 mg by mouth daily.   naproxen sodium (ANAPROX) 220 MG tablet 6/17/2025 Morning  Yes Yes   Sig: Take 440 mg by mouth 2 times daily as needed for moderate pain.   rizatriptan (MAXALT) 10 MG tablet Unknown  Yes Yes   Sig: Take 10 mg by mouth at onset of headache for migraine. TAKE 1 TABLET (10 MG) BY ORAL ROUTE ONCE, MAY REPEAT AT 2 HOUR INTERVALS DO NOT EXCEED 30 MG IN 24 HOURS.   topiramate (TOPAMAX) 200 MG tablet 6/16/2025 Bedtime  Yes Yes   Sig: Take 200 mg by mouth At Bedtime       Facility-Administered Medications: None       ALLERGIES:  Allergies   Allergen Reactions    Adhesive Tape Rash     Some tapes and surgical glue       PAST MEDICAL HISTORY:  Past Medical History:   Diagnosis Date    Adenomatous polyp of colon 05/2016    fu 5 years, [place] gi; fu done 2021 and nl     "Anxiety     Chronic insomnia     on ambien and klonopin for over 10 years together    Depression, major, in partial remission     was seeing psyche until 2015, not since then, as of 2022 seeing Dr. Bryant Flower    Dilated bile duct 2020    extrahepatic, seen on ct for ruq pain, then eus done and no stricture seen    Dysesthesia 2024    of feet, seeing Dr. Ko of neuro    Fibromyalgia 1998    Hypothyroid 90's    Interstitial cystitis     urol [place] and had interstem    Migraines     Dr. Cash of neuro, got botox 2012 via [facility] Dr. Kevin, now Dr. Watkins, then Dr. Ko    Restless leg syndrome     Urgency of urination     interstim       PAST SURGICAL HISTORY:  Past Surgical History:   Procedure Laterality Date    COLONOSCOPY      ENDOSCOPIC ULTRASOUND UPPER GASTROINTESTINAL TRACT (GI) N/A 7/20/2020    Procedure: DIAGNOSTIC ENDOSCOPIC ULTRASOUND, ESOPHAGOSCOPY / UPPER GASTROINTESTINAL TRACT;  Surgeon: Juan Hurst MD;  Location:  OR    interstim procedure  2015, 2010 and 2008    [facility]    LAPAROSCOPIC CHOLECYSTECTOMY N/A 3/24/2021    Procedure: CHOLECYSTECTOMY, LAPAROSCOPIC;  Surgeon: William Leslie MD;  Location: [facility] OR       SOCIAL HISTORY:   Vero Lau  reports that she has never smoked. She has never used smokeless tobacco. She reports that she does not drink alcohol and does not use drugs.      PHYSICAL EXAMINATION:  Temp: 98  F (36.7  C) Temp src: Oral BP: 117/67 Pulse: 61   Resp: 18 SpO2: 98 % O2 Device: None (Room air)    No intake/output data recorded.  Vitals:    06/17/25 1100 06/18/25 0618   Weight: 83 kg (183 lb) 82.7 kg (182 lb 4.8 oz)       Clinically Significant Risk Factors Present on Admission          # Hyperchloremia: Highest Cl = 108 mmol/L in last 2 days, will monitor as appropriate                        # Overweight: Estimated body mass index is 27.72 kg/m  as calculated from the following:    Height as of this encounter: 1.727 m (5' 8\").    Weight as of " this encounter: 82.7 kg (182 lb 4.8 oz).               Antonio Villareal MD, MD    This note was completed in part using dictation via the Dragon voice recognition software. Some word and grammatical errors may occur and must be interpreted in the appropriate clinical context. If there are any questions pertaining to this issue, please contact me for further clarification.

## 2025-06-18 NOTE — DISCHARGE SUMMARY
"[facility]    Discharge Summary  Hospitalist    Date of Admission:  6/17/2025  Date of Discharge:  6/18/2025  Discharging Provider: Lito Poe MD, MD    Discharge Diagnoses   Non cardiac chest pain. Neg cardiac echo and EKG stress test    History of Present Illness   Vero Lua is a 59 year old postmenopausal female with past medical history of migraines, dysesthesias, erythromelalgia, depression/anxiety, chronic insomnia, hypothyroidism, restless leg syndrome, cholecystitis s/p cholecystectomy, among others, who was admitted on 06/17/2025 for further evaluation of chest pain.      Patient presented hemodynamically stable although bradycardic (HR 51). Afebrile. She reports developing chest pain at approximately 0930 while she was in a piliates class. Reports the pain initially felt bilateral breasts (lateral aspect chest wall close to midaxillary line) and wrapped anteriorly across chest wall with some radiation to bilateral upper extremities, as well. She describes pain as a tightness that transitioned to an ache that initially waxed and waned in severity. She states upon symptom presentation pain was approximately 4/10 in severity before reaching 10/10 in severity while at . She notes she experienced significant SOB with symptoms secondary to her chest wall tightness and a subsequent sensation of chest and airway \"restriction.\" Symptoms spontaneously resolved en route before returning upon ambulating to restroom after arriving to [facility] ED. She states her pain was mild and resolved shortly after returning to rest. She is currently chest pain free and denying shortness of breath. There was no post-prandial component or reproducible chest wall tenderness, per patient. Patient denies associated symptoms of pleuritic chest pain, palpitations, diaphoresis, URI symptoms, back/flank pain, abdominal pain, N/V, LE edema or calf tenderness, urinary or bowel changes. No recent " illnesses. No recent travel or surgeries. Patient is a never smoker. She denies history of blood clots. No history of DM. Family history notable only for grandfather who was suspected to have  from MI in 80s. She otherwise denies family history of CAD. Last oral intake 1200 upon arrival to ED. Labs in ED notable for no leukocytosis, anemia or electrolyte derangement. TSH 2.18. D-Dimer unremarkable (0.35). EKG sinus bradycardia without evidence of acute ischemic changes. Initial troponin 19. Repeat 42. TSH 2.18. CXR negative. She was started on heparin infusion in ED.        Hospital Course   Vero Lua was admitted on 2025.  The following problems were addressed during her hospitalization:    Principal Problem:    Non-cardiac chest pain  Noncardiac chest pain. Suspect musculoskeletal.   see HPI. Sxs of cp, diaphoresis, arm sensation and sob resolved before arrival to ED. Mild troponin elevation and downtrending. Peak at 42. No recurrence of sxs. Nl vitals. Negative exam, including cardiopulmonary exam. Nl telemetry.   -Echo normal:   The left ventricle is normal in size.  Left ventricular systolic function is normal.  The visual ejection fraction is 55-60%.  Normal left ventricular wall motion  Diastolic Doppler findings (E/E' ratio and/or other parameters) suggest left  ventricular filling pressures are normal.  The right ventricle is normal in structure, function and size.  No hemodynamically significant valvular abnormalities on 2D or color flow  imaging. There is no comparison study available.    -initiated on heparin gtt on admission.   - Hb A1C 5%, nl bmp, cbc with platelets, LfTs. LDL 67, HDL 75. Nl TSH  - seen by cardiology, EKG stress test ordered and negative.   - pt appropriate for discharge home. Discussed with cardiology.   - follow up with Cardiology RAHUL in 2-3 weeks. Pcp follow up 1-2 weeks.   -no further work up needed at this time. Sxs may have been musculoskeletal from pilates work up  "out.          Hypothyroidism   *TSH 2.18 upon admission. Controlled.   Nl TSH  - Continue PTA levothyroxine.      History of dysesthesias   History of erythromelalgias   History of RLS   *Follows with Neurology for history of neurodermatitis, intractable chronic migraine without aura and without status migrainosus, dysesthesias and erythromelalgias. On PTA botox injections, gabapentin and trintillix.   -no acute issues.   - Continue PTA gabapentin and trintillix.     History of neurodermatitis   *History of significant scalp neurodermatitis where patient reports a \"burning sensation\" of her scalp. On PTA gabapentin, dovonox, zoryve, diprasone and ciclopirox.   - Continue PTA gabapentin, dovonox, zoryve and diprasone.      History of intractable chronic migraine without aura and without status migrainosus  *Follows with Neurology, per above. Receives botox injection of 200 units in 12 weeks. Also on PTA topiramate and rizatriptan PRN. On PTA dutasteride secondary to hair loss. No headache noted upon admission.   - Continue PTA topiramate and rizatriptan.     Depression   Anxiety   Depression and anxiety have been well controlled recently.   - Continue PTA wellbutrin and trintillix.   - Continue to follow-up outpatient for continued management.      Insomnia   - Continue PTA gabapentin and topiramate.      Code Status   - Discussed directly with patient who elects FULL CODE.      Diet: NPO for Medical/Clinical Reasons Except for: Meds  Combination Diet Low Saturated Fat Na <2400mg Diet, No Caffeine Diet  DVT Prophylaxis: Pneumatic Compression Devices  Diallo Catheter: Not present  Lines: None     Cardiac Monitoring: ACTIVE order. Indication: AMI (NSTEMI/ STEMI) (48 hours)  Code Status: Full Code       Lito Peo MD, MD    Significant Results and Procedures   See hospital course     Pending Results   none  Unresulted Labs Ordered in the Past 30 Days of this Admission       No orders found for last 31 day(s). "            Code Status   Full Code       Primary Care Physician   Emile Jara    Physical Exam   Temp: 98  F (36.7  C) Temp src: Oral BP: 107/79 Pulse: 76   Resp: 18 SpO2: 97 % O2 Device: None (Room air)    Vitals:    06/17/25 1100 06/18/25 0618   Weight: 83 kg (183 lb) 82.7 kg (182 lb 4.8 oz)     Vital Signs with Ranges  Temp:  [97.5  F (36.4  C)-98  F (36.7  C)] 98  F (36.7  C)  Pulse:  [51-76] 76  Resp:  [13-18] 18  BP: (107-138)/(67-80) 107/79  SpO2:  [97 %-100 %] 97 %  No intake/output data recorded.    Constitutional: in bed, nad  Respiratory: CTAB  Cardiovascular: RRR no r/g/m  GI: soft, nt, nd  Skin: no rash  Musculoskeletal: no focal jt swelling or redness  Neurologic: nl speech and mentation. Nonfocal neuro exam.   Neuropsychiatric: nl affect    Discharge Disposition   Discharged to home  Condition at discharge: Good    Consultations This Hospital Stay   PHARMACY IP CONSULT  CARDIOLOGY IP CONSULT    Time Spent on this Encounter   I, Lito Poe MD, personally saw the patient today and spent greater than 30 minutes discharging this patient.    Discharge Orders      Follow-Up with Cardiology RAHUL      Reason for your hospital stay    No cardiac/heart chest sensation with shortness of breath. Resolved. Normal cardiac echo and EKG heart stress test.     Activity    Your activity upon discharge: activity as tolerated     Follow Up    Follow up with [facility] in several weeks     Full Code     Diet    Follow this diet upon discharge: Current Diet:Orders Placed This Encounter      Regular Diet Adult     Hospital Follow-up with Existing Primary Care Provider (PCP)          Discharge Medications   Current Discharge Medication List        CONTINUE these medications which have NOT CHANGED    Details   betamethasone dipropionate (DIPROSONE) 0.05 % external lotion Apply topically 2 times daily. APPLY TO AFFECTED AREAS OF A SCALP TWICE A DAY FOR FLARE OF ITCH AND SCALE AS NEEDED.      BOTOX 200  units injection once every twelve weeks. For migraines      buPROPion (WELLBUTRIN XL) 300 MG 24 hr tablet Take 300 mg by mouth every morning.      calcipotriene (DOVONOX) 0.005 % external solution Apply topically 2 times daily.      ciclopirox 1 % SHAM three times a week. APPLY TO SCALP AND THEN WASH OFF UP TO THREE TIMES A WEEK AS TOLERATED      dutasteride (AVODART) 0.5 MG capsule Take 0.5 mg by mouth every morning.      ergocalciferol (ERGOCALCIFEROL) 1.25 MG (83035 UT) capsule Take 50,000 Units by mouth once a week. Takes on sundays      gabapentin (NEURONTIN) 600 MG tablet Take 1 tablet (600 mg) by mouth 3 times daily.  Qty: 270 tablet, Refills: 1    Associated Diagnoses: Dysesthesia      levothyroxine (SYNTHROID/LEVOTHROID) 100 MCG tablet Take 1 tablet (100 mcg) by mouth daily.  Qty: 90 tablet, Refills: 3    Associated Diagnoses: Hypothyroidism, unspecified type      loratadine (CLARITIN) 10 MG tablet Take 10 mg by mouth daily.  Qty: 30 tablet, Refills: 1      naproxen sodium (ANAPROX) 220 MG tablet Take 440 mg by mouth 2 times daily as needed for moderate pain.      NEMLUVIO 30 MG AUIJ 30 mg every 28 days.      rizatriptan (MAXALT) 10 MG tablet Take 10 mg by mouth at onset of headache for migraine. TAKE 1 TABLET (10 MG) BY ORAL ROUTE ONCE, MAY REPEAT AT 2 HOUR INTERVALS DO NOT EXCEED 30 MG IN 24 HOURS.      topiramate (TOPAMAX) 200 MG tablet Take 200 mg by mouth At Bedtime       TRINTELLIX 20 MG tablet Take 20 mg by mouth at bedtime.      ZORYVE 0.3 % FOAM Apply topically daily. To scalp           Allergies   Allergies   Allergen Reactions    Adhesive Tape Rash     Some tapes and surgical glue     Data   Most Recent 3 CBC's:  Recent Labs   Lab Test 06/18/25  0547 06/17/25  1553 06/17/25  1105   WBC 5.7 6.4 7.1   HGB 13.0 12.8 13.4   MCV 94 97 95    160 176      Most Recent 3 BMP's:  Recent Labs   Lab Test 06/18/25  0547 06/17/25  1105 01/23/25  1325    141 144   POTASSIUM 3.7 4.1 3.9   CHLORIDE  108* 105 107   CO2 25 25 27   BUN 11.6 12.8 20.1   CR 0.87 0.91 0.97*   ANIONGAP 10 11 10   RAIMUNDO 9.2 8.8 9.4   GLC 94 93 76     Most Recent 2 LFT's:  Recent Labs   Lab Test 25  0547 25  1325   AST 21 28   ALT 22 22   ALKPHOS 83 89   BILITOTAL 0.5 0.4     Most Recent INR's and Anticoagulation Dosing History:  Anticoagulation Dose History           No data to display              Most Recent 3 Troponin's:No lab results found.  Most Recent Cholesterol Panel:  Recent Labs   Lab Test 25  0547   CHOL 158   LDL 67   HDL 75   TRIG 78     Most Recent 6 Bacteria Isolates From Any Culture (See EPIC Reports for Culture Details):No lab results found.  Most Recent TSH, T4 and A1c Labs:  Recent Labs   Lab Test 25  1553 25  1309 25  1545   TSH  --  2.18 4.71*   T4  --   --  1.18   A1C 5.0  --   --      Results for orders placed or performed during the hospital encounter of 25   XR Chest 2 Views    Narrative    EXAM: XR CHEST 2 VIEWS  LOCATION: [facility]  DATE: 2025    INDICATION: Chest pain, infiltrate  COMPARISON: None.      Impression    IMPRESSION: Negative chest.   Stress Test - Adult    Narrative    [id number]  VOI221[facility] [id number]  [id number]^JIGNESH^FREDIS^KARTIK     [facility]  Echocardiography Laboratory  [address] [place] [address]     Name: MARIA A ANG  MRN: [id number]  : 1965  Study Date: 2025 11:10 AM  Age: 59 yrs  Gender: Female  Patient Location: [facility]  Reason For Study: chest pain  Ordering Physician: Dr Fredis Villareal  Referring Physician: Fredis Villareal  Performed By: Luis Alberto Lynhc     BSA: 2.0 m2  Height: 68 in  Weight: 182 lb  HR: 96  BP: 104/70 mmHg  ______________________________________________________________________________  ______________________________________________________________________________  Interpretation Summary  A treadmill exercise test according to the Kojo protocol was  performed.  The patient exercised 6:05 min.  The patient exhibited no chest pain during exercise.  There were no ST segment changes observed with stress.  Target Heart Rate was achieved.  This was a normal stress EKG with no evidence of stress-induced ischemia.  ______________________________________________________________________________  Baseline  The patient is in normal sinus rhythm.  Nonspecific inferolateral ST/T abnormalities.     Stress  The patient exercised 6:05 min.  RPP [id number].  There was a normal BP response to exercise.  The patient exhibited no chest pain during exercise.  A treadmill exercise test according to the Kojo protocol was performed.  Target Heart Rate was achieved.  There were no ST segment changes observed with stress.  This was a normal stress EKG with no evidence of stress-induced ischemia.     Stress Results  Protocol:  Kojo          Maximum Predicted HR:   161 bpm  Target HR: 137 bpm        % Maximum Predicted HR: 96 %     Stage  DurationHeart Rate  BP             Comment        (mm:ss)   (bpm)  Stage 1   3:00      139   110/68  Stage 2   3:00      153   122/66  Stage 3   0:05      155      /  FAC Below, Coyne 6, RPP [id number]  RecoveryR  3:00      100   116/68     Stress Duration:   6:05 mm:ss *        Recovery Time: 3:00 mm:ss  Maximum Stress HR: 155 bpm *           METS:          7     ______________________________________________________________________________  ______________________________________________________________________________  Report approved by: Dipesh Pike MD on 2025 12:55 PM         Echocardiogram Complete     Value    LVEF  55-60%    Narrative    [id number]  [id number]  [id number]^KEIKO^TRELL^MONISHA     [facility]  Echocardiography Laboratory  [address] [place] [id number]     Name: MARIA A ANG  MRN: [id number]  : 1965  Study Date: 2025 08:49 AM  Age: 59 yrs  Gender: Female  Patient Location:  [facility]  Reason For Study: Chest Tightness, Chest Pain  Ordering Physician: TRELL ADEN  Referring Physician: Emile Jara MD  Performed By: Viviana Bunn     BSA: 2.0 m2  Height: 68 in  Weight: 182 lb  HR: 61  BP: 117/67 mmHg  ______________________________________________________________________________  Procedure  Echocardiogram with two-dimensional, color and spectral Doppler. Definity (NDC  #08383-409) given intravenously.  ______________________________________________________________________________  Interpretation Summary     The left ventricle is normal in size.  Left ventricular systolic function is normal.  The visual ejection fraction is 55-60%.  Normal left ventricular wall motion  Diastolic Doppler findings (E/E' ratio and/or other parameters) suggest left  ventricular filling pressures are normal.  The right ventricle is normal in structure, function and size.  No hemodynamically significant valvular abnormalities on 2D or color flow  imaging. There is no comparison study available.  ______________________________________________________________________________  Left Ventricle  The left ventricle is normal in size. There is normal left ventricular wall  thickness. Left ventricular systolic function is normal. The visual ejection  fraction is 55-60%. Diastolic Doppler findings (E/E' ratio and/or other  parameters) suggest left ventricular filling pressures are normal. Normal left  ventricular wall motion.     Right Ventricle  The right ventricle is normal in structure, function and size.     Atria  Normal left atrial size. Right atrial size is normal. There is no atrial shunt  seen.     Mitral Valve  The mitral valve is normal in structure and function. There is trace mitral  regurgitation.     Tricuspid Valve  The tricuspid valve is normal in structure and function. There is trace  tricuspid regurgitation.     Aortic Valve  The aortic valve is normal in structure and function. No  aortic regurgitation  is present. No aortic stenosis is present.     Pulmonic Valve  The pulmonic valve is not well seen, but is grossly normal. There is trace  pulmonic valvular regurgitation.     Vessels  Normal size aorta.     Pericardium  There is no pericardial effusion.     Rhythm  Sinus rhythm was noted.  ______________________________________________________________________________  MMode/2D Measurements & Calculations  IVSd: 0.83 cm     LVIDd: 4.3 cm  LVIDs: 2.8 cm  LVPWd: 0.91 cm  FS: 35.4 %  LV mass(C)d: 115.4 grams  LV mass(C)dI: 58.8 grams/m2  Ao root diam: 3.3 cm  asc Aorta Diam: 3.0 cm  Ao root diam index Ht(cm/m): 1.9  Ao root diam index BSA (cm/m2): 1.7  Asc Ao diam index BSA (cm/m2): 1.5  Asc Ao diam index Ht(cm/m): 1.7  RWT: 0.43  TAPSE: 2.3 cm     Doppler Measurements & Calculations  MV E max won: 79.3 cm/sec  MV A max won: 81.8 cm/sec  MV E/A: 0.97  MV dec time: 0.18 sec  PA acc time: 0.12 sec     E/E' av.4  Lateral E/e': 6.9  Medial E/e': 7.9  RV S Won: 11.9 cm/sec     ______________________________________________________________________________  Report approved by: Dipesh Pike MD on 2025 09:53 AM

## 2025-06-18 NOTE — PLAN OF CARE
Pt here with chest pain. A&Ox4. VSS on RA, states BP is higher than her usual readings at home. Tele SR. Up independently. C/o neuralgic pain, managing with scheduled gabapentin, ice packs. Heparin infusing 700 units/hr, recheck 1200. Plan --Npo for Cardiology consult.

## 2025-06-18 NOTE — PROGRESS NOTES
The patient is AO X 4, VSS, on RA, bradycardic in the 50s.  She is independent at baseline, comfortable, has CAPELLAN.   Skin is intact, RSV/Covid/influenza (-).

## 2025-06-18 NOTE — PROGRESS NOTES
Admission/Transfer from: ed  2 RN skin assessment completed. Yes  Significant findings include: none  WOC Nurse Consult Ordered? No

## 2025-08-02 ENCOUNTER — HEALTH MAINTENANCE LETTER (OUTPATIENT)
Age: 60
End: 2025-08-02

## (undated) DEVICE — TUBING INSUFFLATION W/FILTER CPC TO LUER 620-030-301

## (undated) DEVICE — ESU GROUND PAD ADULT W/CORD E7507

## (undated) DEVICE — TAPE MEDIPORE 2"X2YD 2962S

## (undated) DEVICE — ENDO POUCH UNIV RETRIEVAL SYSTEM INZII 10MM CD001

## (undated) DEVICE — SUCTION IRR STRYKERFLOW II W/TIP 250-070-520

## (undated) DEVICE — SUCTION MANIFOLD NEPTUNE 2 SYS 1 PORT 702-025-000

## (undated) DEVICE — SU VICRYL 0 UR-6 27" J603H

## (undated) DEVICE — SOL NACL 0.9% INJ 1000ML BAG 2B1324X

## (undated) DEVICE — SYR 50ML LL W/O NDL 309653

## (undated) DEVICE — ENDO TROCAR FIRST ENTRY KII FIOS Z-THRD 12X100MM CTF73

## (undated) DEVICE — SYSTEM CLEARIFY VISUALIZATION 21-345

## (undated) DEVICE — NDL SPINAL 22GA 3.5" QUINCKE 405181

## (undated) DEVICE — PREP CHLORAPREP 26ML TINTED ORANGE  260815

## (undated) DEVICE — SU MONOCRYL 4-0 PS-2 18" UND Y496G

## (undated) DEVICE — PACK LAP CHOLE CUSTOM ASC

## (undated) DEVICE — ADH SKIN CLOSURE PREMIERPRO EXOFIN 1.0ML 3470

## (undated) DEVICE — SYR 30ML LL W/O NDL 302832

## (undated) DEVICE — GLOVE PROTEXIS W/NEU-THERA 7.5  2D73TE75

## (undated) DEVICE — CLIP APPLIER ENDO 5MM M/L LIGAMAX EL5ML

## (undated) DEVICE — LINEN TOWEL PACK X5 5464

## (undated) DEVICE — SOL WATER IRRIG 500ML BOTTLE 2F7113

## (undated) DEVICE — SOL NACL 0.9% IRRIG 500ML BOTTLE 2F7123

## (undated) DEVICE — NDL INSUFFLATION 13GA 150MM C2202

## (undated) DEVICE — ENDO TROCAR FIRST ENTRY KII FIOS Z-THRD 05X100MM CTF03

## (undated) DEVICE — DEVICE SUTURE PASSER 14GA WECK EFX EFXSP2

## (undated) RX ORDER — FENTANYL CITRATE 50 UG/ML
INJECTION, SOLUTION INTRAMUSCULAR; INTRAVENOUS
Status: DISPENSED
Start: 2021-03-24

## (undated) RX ORDER — LIDOCAINE HYDROCHLORIDE 20 MG/ML
INJECTION, SOLUTION EPIDURAL; INFILTRATION; INTRACAUDAL; PERINEURAL
Status: DISPENSED
Start: 2020-07-20

## (undated) RX ORDER — OXYCODONE HYDROCHLORIDE 5 MG/1
TABLET ORAL
Status: DISPENSED
Start: 2021-03-24

## (undated) RX ORDER — PROPOFOL 10 MG/ML
INJECTION, EMULSION INTRAVENOUS
Status: DISPENSED
Start: 2021-03-24

## (undated) RX ORDER — CEFAZOLIN SODIUM 1 G/3ML
INJECTION, POWDER, FOR SOLUTION INTRAMUSCULAR; INTRAVENOUS
Status: DISPENSED
Start: 2021-03-24

## (undated) RX ORDER — FENTANYL CITRATE 50 UG/ML
INJECTION, SOLUTION INTRAMUSCULAR; INTRAVENOUS
Status: DISPENSED
Start: 2020-07-20

## (undated) RX ORDER — GABAPENTIN 300 MG/1
CAPSULE ORAL
Status: DISPENSED
Start: 2021-03-24

## (undated) RX ORDER — LIDOCAINE HYDROCHLORIDE 20 MG/ML
INJECTION, SOLUTION EPIDURAL; INFILTRATION; INTRACAUDAL; PERINEURAL
Status: DISPENSED
Start: 2021-03-24

## (undated) RX ORDER — DEXAMETHASONE SODIUM PHOSPHATE 4 MG/ML
INJECTION, SOLUTION INTRA-ARTICULAR; INTRALESIONAL; INTRAMUSCULAR; INTRAVENOUS; SOFT TISSUE
Status: DISPENSED
Start: 2021-03-24

## (undated) RX ORDER — DEXAMETHASONE SODIUM PHOSPHATE 4 MG/ML
INJECTION, SOLUTION INTRA-ARTICULAR; INTRALESIONAL; INTRAMUSCULAR; INTRAVENOUS; SOFT TISSUE
Status: DISPENSED
Start: 2020-07-20

## (undated) RX ORDER — BUPIVACAINE HYDROCHLORIDE 2.5 MG/ML
INJECTION, SOLUTION EPIDURAL; INFILTRATION; INTRACAUDAL
Status: DISPENSED
Start: 2021-03-24

## (undated) RX ORDER — KETOROLAC TROMETHAMINE 30 MG/ML
INJECTION, SOLUTION INTRAMUSCULAR; INTRAVENOUS
Status: DISPENSED
Start: 2021-03-24

## (undated) RX ORDER — ONDANSETRON 2 MG/ML
INJECTION INTRAMUSCULAR; INTRAVENOUS
Status: DISPENSED
Start: 2020-07-20

## (undated) RX ORDER — ONDANSETRON 2 MG/ML
INJECTION INTRAMUSCULAR; INTRAVENOUS
Status: DISPENSED
Start: 2021-03-24

## (undated) RX ORDER — ACETAMINOPHEN 325 MG/1
TABLET ORAL
Status: DISPENSED
Start: 2021-03-24